# Patient Record
Sex: FEMALE | Race: WHITE | Employment: UNEMPLOYED | URBAN - METROPOLITAN AREA
[De-identification: names, ages, dates, MRNs, and addresses within clinical notes are randomized per-mention and may not be internally consistent; named-entity substitution may affect disease eponyms.]

---

## 2017-01-23 DIAGNOSIS — E72.4 OTC (ORNITHINE TRANSCARBAMYLASE DEFICIENCY) (H): Primary | ICD-10-CM

## 2017-01-23 RX ORDER — CITRULLINE
1.8 POWDER (GRAM) MISCELLANEOUS 4 TIMES DAILY
Qty: 250 G | Refills: 12 | Status: SHIPPED | OUTPATIENT
Start: 2017-01-23 | End: 2018-06-13

## 2017-02-23 ENCOUNTER — OFFICE VISIT (OUTPATIENT)
Dept: ENDOCRINOLOGY | Facility: CLINIC | Age: 37
End: 2017-02-23

## 2017-02-23 VITALS
HEART RATE: 77 BPM | SYSTOLIC BLOOD PRESSURE: 139 MMHG | HEIGHT: 65 IN | DIASTOLIC BLOOD PRESSURE: 85 MMHG | BODY MASS INDEX: 39.65 KG/M2 | WEIGHT: 238 LBS

## 2017-02-23 DIAGNOSIS — E46 PROTEIN-CALORIE MALNUTRITION (H): ICD-10-CM

## 2017-02-23 DIAGNOSIS — E55.9 VITAMIN D DEFICIENCY: ICD-10-CM

## 2017-02-23 DIAGNOSIS — E71.42 CARNITINE DEFICIENCY DUE TO INBORN ERRORS OF METABOLISM (H): ICD-10-CM

## 2017-02-23 DIAGNOSIS — E72.4 OTC (ORNITHINE TRANSCARBAMYLASE DEFICIENCY) (H): Primary | ICD-10-CM

## 2017-02-23 LAB
ALBUMIN SERPL-MCNC: 3.8 G/DL (ref 3.4–5)
ALP SERPL-CCNC: 73 U/L (ref 40–150)
ALT SERPL W P-5'-P-CCNC: 44 U/L (ref 0–50)
ANION GAP SERPL CALCULATED.3IONS-SCNC: 10 MMOL/L (ref 3–14)
AST SERPL W P-5'-P-CCNC: 23 U/L (ref 0–45)
BASOPHILS # BLD AUTO: 0 10E9/L (ref 0–0.2)
BASOPHILS NFR BLD AUTO: 0.5 %
BILIRUB SERPL-MCNC: 0.5 MG/DL (ref 0.2–1.3)
BUN SERPL-MCNC: 7 MG/DL (ref 7–30)
CALCIUM SERPL-MCNC: 8.8 MG/DL (ref 8.5–10.1)
CHLORIDE SERPL-SCNC: 108 MMOL/L (ref 94–109)
CO2 SERPL-SCNC: 21 MMOL/L (ref 20–32)
CREAT SERPL-MCNC: 0.63 MG/DL (ref 0.52–1.04)
DIFFERENTIAL METHOD BLD: NORMAL
EOSINOPHIL # BLD AUTO: 0.1 10E9/L (ref 0–0.7)
EOSINOPHIL NFR BLD AUTO: 1.4 %
ERYTHROCYTE [DISTWIDTH] IN BLOOD BY AUTOMATED COUNT: 13 % (ref 10–15)
GFR SERPL CREATININE-BSD FRML MDRD: NORMAL ML/MIN/1.7M2
GLUCOSE SERPL-MCNC: 95 MG/DL (ref 70–99)
HCT VFR BLD AUTO: 41.6 % (ref 35–47)
HGB BLD-MCNC: 13.8 G/DL (ref 11.7–15.7)
IMM GRANULOCYTES # BLD: 0 10E9/L (ref 0–0.4)
IMM GRANULOCYTES NFR BLD: 0 %
LYMPHOCYTES # BLD AUTO: 2.4 10E9/L (ref 0.8–5.3)
LYMPHOCYTES NFR BLD AUTO: 43.2 %
MCH RBC QN AUTO: 29.1 PG (ref 26.5–33)
MCHC RBC AUTO-ENTMCNC: 33.2 G/DL (ref 31.5–36.5)
MCV RBC AUTO: 88 FL (ref 78–100)
MONOCYTES # BLD AUTO: 0.3 10E9/L (ref 0–1.3)
MONOCYTES NFR BLD AUTO: 4.6 %
NEUTROPHILS # BLD AUTO: 2.8 10E9/L (ref 1.6–8.3)
NEUTROPHILS NFR BLD AUTO: 50.3 %
NRBC # BLD AUTO: 0 10*3/UL
NRBC BLD AUTO-RTO: 0 /100
PLATELET # BLD AUTO: 217 10E9/L (ref 150–450)
POTASSIUM SERPL-SCNC: 3.6 MMOL/L (ref 3.4–5.3)
PREALB SERPL IA-MCNC: 19 MG/DL (ref 15–45)
PROT SERPL-MCNC: 7.6 G/DL (ref 6.8–8.8)
RBC # BLD AUTO: 4.75 10E12/L (ref 3.8–5.2)
SODIUM SERPL-SCNC: 139 MMOL/L (ref 133–144)
WBC # BLD AUTO: 5.6 10E9/L (ref 4–11)

## 2017-02-23 PROCEDURE — 82139 AMINO ACIDS QUAN 6 OR MORE: CPT | Performed by: MEDICAL GENETICS

## 2017-02-23 PROCEDURE — 84466 ASSAY OF TRANSFERRIN: CPT | Performed by: MEDICAL GENETICS

## 2017-02-23 PROCEDURE — 82306 VITAMIN D 25 HYDROXY: CPT | Performed by: MEDICAL GENETICS

## 2017-02-23 ASSESSMENT — PAIN SCALES - GENERAL: PAINLEVEL: NO PAIN (0)

## 2017-02-23 NOTE — LETTER
2017       RE: Shira William   [facility] [id number]  [facility] [address]       Adult Metabolism Clinic Return Visit  Name:  Shira William  :   1980  MRN:   [id number]  Date of Visit: 2017  PCP: Alex Lewis    Managing Metabolic Center(s):  [facility].    Chief Complaint:  Ms. Shira William is a 36 year old female whom I saw in follow up in Metabolism Clinic for OTC deficiency. She also saw our dietitian and nurse coordinator at this visit.     Assessment:    Jenni has been hospitalized once due to hyperammonemia since her last visit. She has recovered well but reports that she has not been taking her Carnitor regularly. We will test her carnitine level to check if she still needs the medication. She continues to mourn the loss of her baby and also has other stressors worrying her at the moment, which causes her IBS to worsen.     Plan:    1. Testing ordered at this visit:   Orders Placed This Encounter   Procedures     Amino acids plasma quantitative     Prealbumin     Transferrin     Vitamin D Deficiency     Carnitine free and total     Comprehensive metabolic panel     CBC with platelets differential   .    Results are as noted, below. Additional recommendations based on these laboratory results:  Glutamine was normal suggesting no chronic hyperammonemia is present.  Several other aminoacids are low consistent with dietary restriction. Hepatic proteins as a measure of protein intake adequacy showed a normal profile of proteins suggesting general protein sufficiency. Liver enzyme tests, kidney function tests, and electrolytes were normal. Blood counts were normal. Vitamin D was low. Plasma free carnitine was below the normal range.  2. Medications: Continue  current medications as noted, below. Shira needs to take her carnitine and vitamin D.  3. Metabolic dietician follow up with Jenni Joyner RD, LD at this visit  to review special dietary concerns. Metabolic diet should be continued as prescribed.  See nutrition history, below. They discussed:  4. Continue to observe emergency precautions as previously discussed.  Our on-call metabolic service is available 24 hours/day by calling the page  ([phone]) and asking for the Genetics and Metabolism doctor on call.  5. Return to the Adult Metabolism Clinic in 6 months for follow-up.     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 The other time she was hospitalized for 3 days due to hyperammonemia. She has recovered well.     Jenni reports that she has not been taking Carnitor as she should, she says that she might have been taken it once a week.     Jenni has a lot of things going on in her life that causes her stress right now and therefore her bowel symptoms have worsened during the last couple of days.     Interval History:  Shira was last seen in our clinic on 8/24/2016.  Since the last clinic visit Shira was seen for 2 urgent clinic visits due to her OTC deficiency.  She was seen in the emergency department 2 times, and 2 of those visits were metabolic related.  She currently knows how to contact the 24 hour metabolic specialist  on call.  1 hospitalizations occurred. Acute metabolic decompensation was noted during 1 of those hospitalizations. She had no general anesthesia and no surgical procedures since the last clinic visit.     Other health services currently received are primary care, dietitian, orthopedics and psychology  Current research study participation Longitudinal Study of Urea Cycle Disorders.     Personal History:  Family History Update:   There was no new family history information elicited at this visit    Lives with father and mother.  Stressors for patient and family: Jenni is still mourning the loss of her baby 2 years ago. Her , Eran, is currently in [employer] which has caused them to lose their house. Therefore, Jenni currently lives with her parents. Eran gets out of [employer] in the beginning of March and until then she sees him twice a week. When Eran gets out they will move into Eran's grandmother.   Community resources received currently are family support related to this inborn error of metabolism.  Current insurance status state/federal program (Medicaid/Medicare).   Neuropsychological evaluation was not performed since the last clinic visit.       I have reviewed Shira s past medical history, family history, social history, medications and allergies as documented in the patient's electronic medical record. There were no additional findings except as noted.     I have reviewed Shira s past medical history, family history, social history, medications and allergies as documented in the patient's electronic medical record.  There were no additional findings except as noted.     Review of Systems:  Constitional: Overweight  Eyes: wears glasses  Ears/Nose/Throat: negative - normal hearing  Respiratory: Occasionally needs albuterol  Cardiovascular: On atenolol  Gastrointestinal: Irritable bowel symptoms, see about  Genitourinary: negative  Musculoskeletal: negative  Endocrine:  "negative  Hematologic/Lymphatic/Immunologic: negative  Integument: Vitiligo  Neurologic: intellectual disability  Psychiatric: Anxiety and some depression, currently on medications    Medications:  Current Outpatient Prescriptions   Medication Sig Dispense Refill     l-citrulline POWD Take 1.8 g by mouth 4 times daily 250 g 12     glycerol Phenylbutyrate (RAVICTI) 1.1 GM/ML solution Take 3.4 mLs (3.74 g) by mouth 3 times daily With food 306 mL 6     omeprazole (PRILOSEC) 20 MG capsule Take by mouth daily       sertraline (ZOLOFT) 100 MG tablet Take by mouth daily       HYDROcodone-acetaminophen (VICODIN) 5-500 MG per tablet Take 1-2 tablets by mouth every 6 hours as needed for moderate to severe pain       ondansetron (ZOFRAN-ODT) 4 MG disintegrating tablet Take by mouth every 12 hours as needed for nausea       pimecrolimus (ELIDEL) 1 % cream Apply topically 2 times daily       atenolol (TENORMIN) 50 MG tablet Take 50 mg by mouth daily       ibuprofen (ADVIL,MOTRIN) 400 MG tablet Take 400 mg by mouth every 6 hours as needed for moderate pain       levOCARNitine (CARNITOR) 1 GM/10ML solution Take 5 mLs (500 mg) by mouth 3 times daily 465 mL 11     Multiple Vitamins-Iron (MULTIVITAMIN  /IRON) TABS Take 1 tablet daily.       calcium-vitamin D (CALCIUM 600 + D) 600-400 MG-UNIT per tablet Take 1 tablet by mouth 2 times daily (with meals). 60 tablet 0        Allergies:  Allergies   Allergen Reactions     Compazine [Prochlorperazine]      Erythromycin      Msg [Magnesium Salicylate]      Physical Examination:  Blood pressure 139/85, pulse 77, height 5' 5\" (165.1 cm), weight 238 lb (108 kg).  Body surface area is 2.23 meters squared.  General: This was a well-developed, well-nourished female who responded appropriately to all requests during the examination.    Head and Neck:  She had hair of normal texture and distribution and her head was proportionate in appearance.The neck was supple without lymphadenopathy.    Eyes:  " The pupils were equal, round, and reacted to light.   The conjunctivae were clear.   Ears:  Her ears were normal in architecture and placement.   Nose: The nose was clear.    Mouth and Throat: her dentition was normal.  The throat was without erythema.    Respiratory: The chest was clear to auscultation and had a symmetric appearance.    CV:  On examination of the heart, the rhythm was regular and there was no murmur.  The peripheral pulses were normal.    GI: The abdomen was soft and had normal bowel sounds.  There was no hepatosplenomegaly.    : I deferred a  examination.   Musculoskeletal: There was a full range of motion on the extremity exam, and normal muscular volume and bulk.   Neurologic: The neurologic exam was normal, with normal cranial nerves, normal deep tendon reflexes, normal sensation, and a normal gait. She had normal tone.   Integument: The skin was normal with no rashes or unusual pigmentation.    Results of laboratory studies collected at this visit and available when this note was completed:   Results for orders placed or performed in visit on 02/23/17   Amino acids plasma quantitative   Result Value Ref Range    A-Aminoadipic <1 0 - 6 umol/dL    Alanine 32 22 - 62 umol/dL    Anserine Negative umol/dL    Arginine 6 2 - 18 umol/dL    Asparagine 4 1 - 5 umol/dL    Aspartic Acid 1 0 - 4 umol/dL    B-Alanine Plasma Negative umol/dL    B-Aminoisobutyric Negative umol/dL    Carnosine Negative umol/dL    Citrulline  0 - 8 umol/dL     2.4  Citrulline Reference Ranges in umol/dL as stated by [facility]:   Premature               2.0-8.7   1-31 days               1.0-4.5   32 days - 23 months     0.3-3.5   2 years - 18 years      0.1-4.6   >19 years               1.2-5.5      Cystathionine Negative umol/dL    Cystine 10 3 - 15 umol/dL    Glutamic Acid 12 0 - 16 umol/dL    Glutamine 68 41 - 86 umol/dL    Glycine 21 13 - 50 umol/dL    Histidine 7 3 - 15 umol/dL    1-Methylhistidine 2 0 -  2 umol/dL    3-Methylhistidine <1 0 - 1 umol/dL    Homocysteine umol/dL Negative umol/dL    Hydroxylysine Negative umol/dL    Hydroxyproline 1 0 - 3 umol/dL    Isoleucine 7 4 - 11 umol/dL    Leucine 12 8 - 21 umol/dL    Lysine 18 6 - 26 umol/dL    Methionine 2 1 - 6 umol/dL    Ornithine 5 2 - 16 umol/dL    Phenylalanine umol/dL 6 3 - 10 umol/dL    Proline 18 0 - 48 umol/dL    Sarcosine Plasma 1 umol/dL    Serine 9 4 - 18 umol/dL    Taurine 6 (L) 7 - 32 umol/dL    Threonine 10 5 - 25 umol/dL    Tyrosine 8 4 - 13 umol/dL    Valine 23 8 - 46 umol/dL   Prealbumin   Result Value Ref Range    Prealbumin 19 15 - 45 mg/dL   Transferrin   Result Value Ref Range    Transferrin 273 210 - 360 mg/dL   Vitamin D Deficiency   Result Value Ref Range    Vitamin D Deficiency screening 16 (L) 20 - 75 ug/L   Carnitine free and total   Result Value Ref Range    Carnitine Free 16 (L)     CarnitineTotal 32 (L)     Carnitine Esterified 16     Carnitine Esterified/Free Ratio 1.0    Comprehensive metabolic panel   Result Value Ref Range    Sodium 139 133 - 144 mmol/L    Potassium 3.6 3.4 - 5.3 mmol/L    Chloride 108 94 - 109 mmol/L    Carbon Dioxide 21 20 - 32 mmol/L    Anion Gap 10 3 - 14 mmol/L    Glucose 95 70 - 99 mg/dL    Urea Nitrogen 7 7 - 30 mg/dL    Creatinine 0.63 0.52 - 1.04 mg/dL    GFR Estimate >90  Non  GFR Calc   >60 mL/min/1.7m2    GFR Estimate If Black >90   GFR Calc   >60 mL/min/1.7m2    Calcium 8.8 8.5 - 10.1 mg/dL    Bilirubin Total 0.5 0.2 - 1.3 mg/dL    Albumin 3.8 3.4 - 5.0 g/dL    Protein Total 7.6 6.8 - 8.8 g/dL    Alkaline Phosphatase 73 40 - 150 U/L    ALT 44 0 - 50 U/L    AST 23 0 - 45 U/L   CBC with platelets differential   Result Value Ref Range    WBC 5.6 4.0 - 11.0 10e9/L    RBC Count 4.75 3.8 - 5.2 10e12/L    Hemoglobin 13.8 11.7 - 15.7 g/dL    Hematocrit 41.6 35.0 - 47.0 %    MCV 88 78 - 100 fl    MCH 29.1 26.5 - 33.0 pg    MCHC 33.2 31.5 - 36.5 g/dL    RDW 13.0 10.0 - 15.0 %     Platelet Count 217 150 - 450 10e9/L    Diff Method Automated Method     % Neutrophils 50.3 %    % Lymphocytes 43.2 %    % Monocytes 4.6 %    % Eosinophils 1.4 %    % Basophils 0.5 %    % Immature Granulocytes 0.0 %    Nucleated RBCs 0 0 /100    Absolute Neutrophil 2.8 1.6 - 8.3 10e9/L    Absolute Lymphocytes 2.4 0.8 - 5.3 10e9/L    Absolute Monocytes 0.3 0.0 - 1.3 10e9/L    Absolute Eosinophils 0.1 0.0 - 0.7 10e9/L    Absolute Basophils 0.0 0.0 - 0.2 10e9/L    Abs Immature Granulocytes 0.0 0 - 0.4 10e9/L    Absolute Nucleated RBC 0.0        CHUN RODRIGUEZ M.D.  Professor and Director   Division of Genetics and Metabolism  Department of Pediatrics  [facility]    Routed to patient in Comm [facility]  Also to Alex Lewis

## 2017-02-23 NOTE — PATIENT INSTRUCTIONS
Metabolism Clinic    Maintain metabolic diet and medications.  Call if any general care questions arise - contact our nurse coordinator Sharyn Mattson at [phone].  Contact the metabolic doctor on-call if any immediate need because of illness - [phone]

## 2017-02-23 NOTE — PROGRESS NOTES
Adult Metabolism Clinic Return Visit  Name:  Shira William  :   1980  MRN:   [id number]  Date of Visit: 2017  PCP: Alex Lewis    Managing Metabolic Center(s):  [facility] Adult Metabolism Clinic.    Chief Complaint:  Ms. Shira William is a 36 year old female whom I saw in follow up in Metabolism Clinic for OTC deficiency. She also saw our dietitian and nurse coordinator at this visit.     Assessment:    Jenni has been hospitalized once due to hyperammonemia since her last visit. She has recovered well but reports that she has not been taking her Carnitor regularly. We will test her carnitine level to check if she still needs the medication. She continues to mourn the loss of her baby and also has other stressors worrying her at the moment, which causes her IBS to worsen.     Plan:    1. Testing ordered at this visit:   Orders Placed This Encounter   Procedures     Amino acids plasma quantitative     Prealbumin     Transferrin     Vitamin D Deficiency     Carnitine free and total     Comprehensive metabolic panel     CBC with platelets differential   .    Results are as noted, below. Additional recommendations based on these laboratory results:  Glutamine was normal suggesting no chronic hyperammonemia is present.  Several other aminoacids are low consistent with dietary restriction. Hepatic proteins as a measure of protein intake adequacy showed a normal profile of proteins suggesting general protein sufficiency. Liver enzyme tests, kidney function tests, and electrolytes were normal. Blood counts were normal. Vitamin D was low. Plasma free carnitine was below the normal range.  2. Medications: Continue  current medications as noted, below. Shira needs to take her carnitine and vitamin D.  3. Metabolic dietician follow up with Jenni Joyner RD, LD at this visit to review special dietary concerns. Metabolic diet should be continued as prescribed.  See  nutrition history, below. They discussed:  4. Continue to observe emergency precautions as previously discussed.  Our on-call metabolic service is available 24 hours/day by calling the page  ([phone]) and asking for the Genetics and Metabolism doctor on call.  5. Return to the Adult Metabolism Clinic in 6 months for follow-up.     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 The other time she was hospitalized for 3 days due to hyperammonemia. She has recovered well.     Jenni reports that she has not been taking Carnitor as she should, she says that she might have been taken it once a week.     Jenni has a lot of things going on in her life that causes her stress right now and therefore her bowel symptoms have worsened during the last couple of days.     Interval History:  Shira was last seen in our clinic on 8/24/2016.  Since the last clinic visit Shira was seen for 2 urgent clinic visits due to her OTC deficiency.  She was seen in the emergency department 2 times, and 2 of those visits were metabolic related.  She currently knows how to contact the 24 hour metabolic specialist on call.  1 hospitalizations occurred. Acute metabolic decompensation was noted during 1 of  those hospitalizations. She had no general anesthesia and no surgical procedures since the last clinic visit.     Other health services currently received are primary care, dietitian, orthopedics and psychology  Current research study participation Longitudinal Study of Urea Cycle Disorders.     Personal History:  Family History Update:   There was no new family history information elicited at this visit    Lives with father and mother.  Stressors for patient and family: Jenni is still mourning the loss of her baby 2 years ago. Her , Eran, is currently in half-way which has caused them to lose their house. Therefore, Jenni currently lives with her parents. Eran gets out of half-way in the beginning of March and until then she sees him twice a week. When Eran gets out they will move into Eran's grandmother.   Community resources received currently are family support related to this inborn error of metabolism.  Current insurance status state/federal program (Medicaid/Medicare).   Neuropsychological evaluation was not performed since the last clinic visit.       I have reviewed Shira s past medical history, family history, social history, medications and allergies as documented in the patient's electronic medical record. There were no additional findings except as noted.     I have reviewed Shira s past medical history, family history, social history, medications and allergies as documented in the patient's electronic medical record.  There were no additional findings except as noted.     Review of Systems:  Constitional: Overweight  Eyes: wears glasses  Ears/Nose/Throat: negative - normal hearing  Respiratory: Occasionally needs albuterol  Cardiovascular: On atenolol  Gastrointestinal: Irritable bowel symptoms, see about  Genitourinary: negative  Musculoskeletal: negative  Endocrine: negative  Hematologic/Lymphatic/Immunologic: negative  Integument: Vitiligo  Neurologic: intellectual disability  Psychiatric: Anxiety  "and some depression, currently on medications    Medications:  Current Outpatient Prescriptions   Medication Sig Dispense Refill     l-citrulline POWD Take 1.8 g by mouth 4 times daily 250 g 12     glycerol Phenylbutyrate (RAVICTI) 1.1 GM/ML solution Take 3.4 mLs (3.74 g) by mouth 3 times daily With food 306 mL 6     omeprazole (PRILOSEC) 20 MG capsule Take by mouth daily       sertraline (ZOLOFT) 100 MG tablet Take by mouth daily       HYDROcodone-acetaminophen (VICODIN) 5-500 MG per tablet Take 1-2 tablets by mouth every 6 hours as needed for moderate to severe pain       ondansetron (ZOFRAN-ODT) 4 MG disintegrating tablet Take by mouth every 12 hours as needed for nausea       pimecrolimus (ELIDEL) 1 % cream Apply topically 2 times daily       atenolol (TENORMIN) 50 MG tablet Take 50 mg by mouth daily       ibuprofen (ADVIL,MOTRIN) 400 MG tablet Take 400 mg by mouth every 6 hours as needed for moderate pain       levOCARNitine (CARNITOR) 1 GM/10ML solution Take 5 mLs (500 mg) by mouth 3 times daily 465 mL 11     Multiple Vitamins-Iron (MULTIVITAMIN  /IRON) TABS Take 1 tablet daily.       calcium-vitamin D (CALCIUM 600 + D) 600-400 MG-UNIT per tablet Take 1 tablet by mouth 2 times daily (with meals). 60 tablet 0        Allergies:  Allergies   Allergen Reactions     Compazine [Prochlorperazine]      Erythromycin      Msg [Magnesium Salicylate]      Physical Examination:  Blood pressure 139/85, pulse 77, height 5' 5\" (165.1 cm), weight 238 lb (108 kg).  Body surface area is 2.23 meters squared.  General: This was a well-developed, well-nourished female who responded appropriately to all requests during the examination.    Head and Neck:  She had hair of normal texture and distribution and her head was proportionate in appearance.The neck was supple without lymphadenopathy.    Eyes:  The pupils were equal, round, and reacted to light.   The conjunctivae were clear.   Ears:  Her ears were normal in architecture and " placement.   Nose: The nose was clear.    Mouth and Throat: her dentition was normal.  The throat was without erythema.    Respiratory: The chest was clear to auscultation and had a symmetric appearance.    CV:  On examination of the heart, the rhythm was regular and there was no murmur.  The peripheral pulses were normal.    GI: The abdomen was soft and had normal bowel sounds.  There was no hepatosplenomegaly.    : I deferred a  examination.   Musculoskeletal: There was a full range of motion on the extremity exam, and normal muscular volume and bulk.   Neurologic: The neurologic exam was normal, with normal cranial nerves, normal deep tendon reflexes, normal sensation, and a normal gait. She had normal tone.   Integument: The skin was normal with no rashes or unusual pigmentation.    Results of laboratory studies collected at this visit and available when this note was completed:   Results for orders placed or performed in visit on 02/23/17   Amino acids plasma quantitative   Result Value Ref Range    A-Aminoadipic <1 0 - 6 umol/dL    Alanine 32 22 - 62 umol/dL    Anserine Negative umol/dL    Arginine 6 2 - 18 umol/dL    Asparagine 4 1 - 5 umol/dL    Aspartic Acid 1 0 - 4 umol/dL    B-Alanine Plasma Negative umol/dL    B-Aminoisobutyric Negative umol/dL    Carnosine Negative umol/dL    Citrulline  0 - 8 umol/dL     2.4  Citrulline Reference Ranges in umol/dL as stated by [facility]:   Premature               2.0-8.7   1-31 days               1.0-4.5   32 days - 23 months     0.3-3.5   2 years - 18 years      0.1-4.6   >19 years               1.2-5.5      Cystathionine Negative umol/dL    Cystine 10 3 - 15 umol/dL    Glutamic Acid 12 0 - 16 umol/dL    Glutamine 68 41 - 86 umol/dL    Glycine 21 13 - 50 umol/dL    Histidine 7 3 - 15 umol/dL    1-Methylhistidine 2 0 - 2 umol/dL    3-Methylhistidine <1 0 - 1 umol/dL    Homocysteine umol/dL Negative umol/dL    Hydroxylysine Negative umol/dL     Hydroxyproline 1 0 - 3 umol/dL    Isoleucine 7 4 - 11 umol/dL    Leucine 12 8 - 21 umol/dL    Lysine 18 6 - 26 umol/dL    Methionine 2 1 - 6 umol/dL    Ornithine 5 2 - 16 umol/dL    Phenylalanine umol/dL 6 3 - 10 umol/dL    Proline 18 0 - 48 umol/dL    Sarcosine Plasma 1 umol/dL    Serine 9 4 - 18 umol/dL    Taurine 6 (L) 7 - 32 umol/dL    Threonine 10 5 - 25 umol/dL    Tyrosine 8 4 - 13 umol/dL    Valine 23 8 - 46 umol/dL   Prealbumin   Result Value Ref Range    Prealbumin 19 15 - 45 mg/dL   Transferrin   Result Value Ref Range    Transferrin 273 210 - 360 mg/dL   Vitamin D Deficiency   Result Value Ref Range    Vitamin D Deficiency screening 16 (L) 20 - 75 ug/L   Carnitine free and total   Result Value Ref Range    Carnitine Free 16 (L)     CarnitineTotal 32 (L)     Carnitine Esterified 16     Carnitine Esterified/Free Ratio 1.0    Comprehensive metabolic panel   Result Value Ref Range    Sodium 139 133 - 144 mmol/L    Potassium 3.6 3.4 - 5.3 mmol/L    Chloride 108 94 - 109 mmol/L    Carbon Dioxide 21 20 - 32 mmol/L    Anion Gap 10 3 - 14 mmol/L    Glucose 95 70 - 99 mg/dL    Urea Nitrogen 7 7 - 30 mg/dL    Creatinine 0.63 0.52 - 1.04 mg/dL    GFR Estimate >90  Non  GFR Calc   >60 mL/min/1.7m2    GFR Estimate If Black >90   GFR Calc   >60 mL/min/1.7m2    Calcium 8.8 8.5 - 10.1 mg/dL    Bilirubin Total 0.5 0.2 - 1.3 mg/dL    Albumin 3.8 3.4 - 5.0 g/dL    Protein Total 7.6 6.8 - 8.8 g/dL    Alkaline Phosphatase 73 40 - 150 U/L    ALT 44 0 - 50 U/L    AST 23 0 - 45 U/L   CBC with platelets differential   Result Value Ref Range    WBC 5.6 4.0 - 11.0 10e9/L    RBC Count 4.75 3.8 - 5.2 10e12/L    Hemoglobin 13.8 11.7 - 15.7 g/dL    Hematocrit 41.6 35.0 - 47.0 %    MCV 88 78 - 100 fl    MCH 29.1 26.5 - 33.0 pg    MCHC 33.2 31.5 - 36.5 g/dL    RDW 13.0 10.0 - 15.0 %    Platelet Count 217 150 - 450 10e9/L    Diff Method Automated Method     % Neutrophils 50.3 %    % Lymphocytes 43.2 %    %  Monocytes 4.6 %    % Eosinophils 1.4 %    % Basophils 0.5 %    % Immature Granulocytes 0.0 %    Nucleated RBCs 0 0 /100    Absolute Neutrophil 2.8 1.6 - 8.3 10e9/L    Absolute Lymphocytes 2.4 0.8 - 5.3 10e9/L    Absolute Monocytes 0.3 0.0 - 1.3 10e9/L    Absolute Eosinophils 0.1 0.0 - 0.7 10e9/L    Absolute Basophils 0.0 0.0 - 0.2 10e9/L    Abs Immature Granulocytes 0.0 0 - 0.4 10e9/L    Absolute Nucleated RBC 0.0        CHUN RODRIGUEZ M.D.  Professor and Director   Division of Genetics and Metabolism  Department of Pediatrics  [facility]    Routed to patient in Comm Mgt  Also to Alex Lewis

## 2017-02-23 NOTE — MR AVS SNAPSHOT
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  Call if any general care questions arise - contact our nurse coordinator Sharyn Mattson at [phone].  Contact the metabolic doctor on-call if any immediate need because of illness - [phone]          Follow-ups after your visit        Follow-up notes from your care team     Return in about 6 months (around 8/23/2017).      Your next 10 appointments already scheduled     Aug 24, 2017  3:45 PM CDT   (Arrive by 3:30 PM)   Return Visit with Toya Clayton MD   [facility] Metabolic Disorders ([facility])    [address] [place] [id number] [phone]              Who to contact     Please call your clinic at [phone] to:    Ask questions about your health    Make or cancel appointments    Discuss your medicines    Learn about your test results    Speak to your doctor   If you have compliments or concerns about an experience at your clinic, or if you wish to file a complaint, please contact [facility]icians Patient Relations at [phone] or email us at [email or web address]         Additional Information About Your Visit        MyChart Information     3nder is an electronic gateway that provides easy, online access to your medical records. With 3nder, you can request  "a clinic appointment, read your test results, renew a prescription or communicate with your care team.     To sign up for PixelFish visit the website at [email or web address]   You will be asked to enter the access code listed below, as well as some personal information. Please follow the directions to create your username and password.     Your access code is: [email or web address]  Expires: 5/10/2017  6:30 AM     Your access code will  in 90 days. If you need help or a new code, please contact your [facility] or call [phone] for assistance.        Care EveryWhere ID     This is your Care EveryWhere ID. This could be used by other organizations to access your [place] medical records  [id number]        Your Vitals Were     Pulse Height BMI (Body Mass Index)             77 1.651 m (5' 5\") 39.61 kg/m2          Blood Pressure from Last 3 Encounters:   17 139/85   16 117/75   16 130/76    Weight from Last 3 Encounters:   17 108 kg (238 lb)   16 107.4 kg (236 lb 11.2 oz)   16 99.8 kg (220 lb)              We Performed the Following     Amino acids plasma quantitative     Carnitine free and total     CBC with platelets differential     Comprehensive metabolic panel     Prealbumin     Transferrin     Vitamin D Deficiency        Primary Care Provider Office Phone # Fax #    Alex Joshua [phone]       [address] [place] [address]        Thank you!     Thank you for choosing [facility] METABOLIC DISORDERS  for your care. Our goal is always to provide you with excellent care. Hearing back from our patients is one way we can continue to improve our services. Please take a few minutes to complete the written survey that you may receive in the mail after your visit with us. Thank you!             Your Updated Medication List - Protect others around you: Learn how to safely use, store and throw away your " medicines at www.disposemymeds.org.          This list is accurate as of: 2/23/17  5:13 PM.  Always use your most recent med list.                   Brand Name Dispense Instructions for use    atenolol 50 MG tablet    TENORMIN     Take 50 mg by mouth daily       calcium-vitamin D 600-400 MG-UNIT per tablet    calcium 600 + D    60 tablet    Take 1 tablet by mouth 2 times daily (with meals).       glycerol Phenylbutyrate 1.1 GM/ML solution    RAVICTI    306 mL    Take 3.4 mLs (3.74 g) by mouth 3 times daily With food       HYDROcodone-acetaminophen 5-500 MG per tablet    VICODIN     Take 1-2 tablets by mouth every 6 hours as needed for moderate to severe pain       ibuprofen 400 MG tablet    ADVIL/MOTRIN     Take 400 mg by mouth every 6 hours as needed for moderate pain       l-citrulline Powd     250 g    Take 1.8 g by mouth 4 times daily       levOCARNitine 1 GM/10ML solution    CARNITOR    465 mL    Take 5 mLs (500 mg) by mouth 3 times daily       Multivitamin  /Iron Tabs      Take 1 tablet daily.       omeprazole 20 MG CR capsule    priLOSEC     Take by mouth daily       ondansetron 4 MG ODT tab    ZOFRAN-ODT     Take by mouth every 12 hours as needed for nausea       pimecrolimus 1 % cream    ELIDEL     Apply topically 2 times daily       sertraline 100 MG tablet    ZOLOFT     Take by mouth daily

## 2017-02-24 LAB
DEPRECATED CALCIDIOL+CALCIFEROL SERPL-MC: 16 UG/L (ref 20–75)
TRANSFERRIN SERPL-MCNC: 273 MG/DL (ref 210–360)

## 2017-02-27 LAB
(HCYS)2 SERPL-SCNC: NEGATIVE UMOL/DL
1ME-HIST SERPL-SCNC: 2 UMOL/DL (ref 0–2)
3ME-HISTIDINE SERPL-SCNC: <1 UMOL/DL (ref 0–1)
AAA SERPL-SCNC: <1 UMOL/DL (ref 0–6)
ALANINE SERPL-SCNC: NEGATIVE UMOL/DL
ALANINE SFR SERPL: 32 UMOL/DL (ref 22–62)
ANSERINE SERPL-SCNC: NEGATIVE UMOL/DL
ARGININE SERPL-SCNC: 6 UMOL/DL (ref 2–18)
ASPARAGINE SERPL-SCNC: 4 UMOL/DL (ref 1–5)
ASPARTATE SERPL-SCNC: 1 UMOL/DL (ref 0–4)
B-AIB SERPL-SCNC: NEGATIVE UMOL/DL
CARNOSINE SERPL-SCNC: NEGATIVE UMOL/DL
CITRULLINE SERPL-SCNC: ABNORMAL UMOL/DL (ref 0–8)
CYSTATHIONIN SERPL-SCNC: NEGATIVE UMOL/DL
CYSTINE SERPL-SCNC: 10 UMOL/DL (ref 3–15)
GLUTAMATE SERPL-SCNC: 12 UMOL/DL (ref 0–16)
GLUTAMATE SERPL-SCNC: 68 UMOL/DL (ref 41–86)
GLYCINE SERPL-SCNC: 21 UMOL/DL (ref 13–50)
HISTIDINE SERPL-SCNC: 7 UMOL/DL (ref 3–15)
ISOLEUCINE SERPL-SCNC: 7 UMOL/DL (ref 4–11)
LEUCINE SERPL-SCNC: 12 UMOL/DL (ref 8–21)
LYSINE SERPL-SCNC: 18 UMOL/DL (ref 6–26)
METHIONINE SERPL-SCNC: 2 UMOL/DL (ref 1–6)
OH-LYSINE SERPL-SCNC: NEGATIVE UMOL/DL
OH-PROLINE SERPL-SCNC: 1 UMOL/DL (ref 0–3)
ORNITHINE SERPL-SCNC: 5 UMOL/DL (ref 2–16)
PHE SERPL-SCNC: 6 UMOL/DL (ref 3–10)
PROLINE SERPL-SCNC: 18 UMOL/DL (ref 0–48)
SARCOSINE SERPL-SCNC: 1 UMOL/DL
SERINE SERPL-SCNC: 9 UMOL/DL (ref 4–18)
TAURINE SERPL-SCNC: 6 UMOL/DL (ref 7–32)
THREONINE SERPL-SCNC: 10 UMOL/DL (ref 5–25)
TYROSINE SERPL-SCNC: 8 UMOL/DL (ref 4–13)
VALINE SERPL-SCNC: 23 UMOL/DL (ref 8–46)

## 2017-03-01 LAB
ACYLCARNITINE SERPL-SCNC: 16 UMOL/L
CARN ESTERS/C0 SERPL-SRTO: 1 {RATIO}
CARNITINE FREE SERPL-SCNC: 16 UMOL/L
CARNITINE SERPL-SCNC: 32 UMOL/L

## 2017-08-24 ENCOUNTER — OFFICE VISIT (OUTPATIENT)
Dept: ENDOCRINOLOGY | Facility: CLINIC | Age: 37
End: 2017-08-24

## 2017-08-24 ENCOUNTER — ALLIED HEALTH/NURSE VISIT (OUTPATIENT)
Dept: ENDOCRINOLOGY | Facility: CLINIC | Age: 37
End: 2017-08-24

## 2017-08-24 VITALS
SYSTOLIC BLOOD PRESSURE: 126 MMHG | WEIGHT: 224.4 LBS | BODY MASS INDEX: 37.39 KG/M2 | HEART RATE: 69 BPM | DIASTOLIC BLOOD PRESSURE: 84 MMHG | HEIGHT: 65 IN

## 2017-08-24 DIAGNOSIS — E55.9 VITAMIN D DEFICIENCY: ICD-10-CM

## 2017-08-24 DIAGNOSIS — E71.42 CARNITINE DEFICIENCY DUE TO INBORN ERRORS OF METABOLISM (H): ICD-10-CM

## 2017-08-24 DIAGNOSIS — E72.4 OTC (ORNITHINE TRANSCARBAMYLASE DEFICIENCY) (H): ICD-10-CM

## 2017-08-24 DIAGNOSIS — E72.4 OTC (ORNITHINE TRANSCARBAMYLASE DEFICIENCY) (H): Primary | ICD-10-CM

## 2017-08-24 LAB
ALBUMIN SERPL-MCNC: 3.5 G/DL (ref 3.4–5)
ALP SERPL-CCNC: 75 U/L (ref 40–150)
ALT SERPL W P-5'-P-CCNC: 21 U/L (ref 0–50)
ANION GAP SERPL CALCULATED.3IONS-SCNC: 8 MMOL/L (ref 3–14)
AST SERPL W P-5'-P-CCNC: 11 U/L (ref 0–45)
BASOPHILS # BLD AUTO: 0 10E9/L (ref 0–0.2)
BASOPHILS NFR BLD AUTO: 0.6 %
BILIRUB SERPL-MCNC: 0.3 MG/DL (ref 0.2–1.3)
BUN SERPL-MCNC: 11 MG/DL (ref 7–30)
CALCIUM SERPL-MCNC: 8.9 MG/DL (ref 8.5–10.1)
CHLORIDE SERPL-SCNC: 104 MMOL/L (ref 94–109)
CO2 SERPL-SCNC: 25 MMOL/L (ref 20–32)
CREAT SERPL-MCNC: 0.71 MG/DL (ref 0.52–1.04)
DIFFERENTIAL METHOD BLD: ABNORMAL
EOSINOPHIL # BLD AUTO: 0.1 10E9/L (ref 0–0.7)
EOSINOPHIL NFR BLD AUTO: 0.8 %
ERYTHROCYTE [DISTWIDTH] IN BLOOD BY AUTOMATED COUNT: 15.4 % (ref 10–15)
GFR SERPL CREATININE-BSD FRML MDRD: >90 ML/MIN/1.7M2
GLUCOSE SERPL-MCNC: 85 MG/DL (ref 70–99)
HCT VFR BLD AUTO: 38.9 % (ref 35–47)
HGB BLD-MCNC: 12.2 G/DL (ref 11.7–15.7)
IMM GRANULOCYTES # BLD: 0 10E9/L (ref 0–0.4)
IMM GRANULOCYTES NFR BLD: 0.2 %
LYMPHOCYTES # BLD AUTO: 2.5 10E9/L (ref 0.8–5.3)
LYMPHOCYTES NFR BLD AUTO: 38.2 %
MCH RBC QN AUTO: 25.7 PG (ref 26.5–33)
MCHC RBC AUTO-ENTMCNC: 31.4 G/DL (ref 31.5–36.5)
MCV RBC AUTO: 82 FL (ref 78–100)
MONOCYTES # BLD AUTO: 0.4 10E9/L (ref 0–1.3)
MONOCYTES NFR BLD AUTO: 5.3 %
NEUTROPHILS # BLD AUTO: 3.7 10E9/L (ref 1.6–8.3)
NEUTROPHILS NFR BLD AUTO: 54.9 %
NRBC # BLD AUTO: 0 10*3/UL
NRBC BLD AUTO-RTO: 0 /100
PLATELET # BLD AUTO: 229 10E9/L (ref 150–450)
POTASSIUM SERPL-SCNC: 3.7 MMOL/L (ref 3.4–5.3)
PREALB SERPL IA-MCNC: 17 MG/DL (ref 15–45)
PROT SERPL-MCNC: 7.8 G/DL (ref 6.8–8.8)
RBC # BLD AUTO: 4.74 10E12/L (ref 3.8–5.2)
SODIUM SERPL-SCNC: 137 MMOL/L (ref 133–144)
WBC # BLD AUTO: 6.7 10E9/L (ref 4–11)

## 2017-08-24 PROCEDURE — 82306 VITAMIN D 25 HYDROXY: CPT | Performed by: MEDICAL GENETICS

## 2017-08-24 PROCEDURE — 82139 AMINO ACIDS QUAN 6 OR MORE: CPT | Performed by: MEDICAL GENETICS

## 2017-08-24 RX ORDER — LEVOCARNITINE 1 G/10ML
500 SOLUTION ORAL 3 TIMES DAILY
Qty: 465 ML | Refills: 11 | Status: SHIPPED | OUTPATIENT
Start: 2017-08-24 | End: 2018-09-21

## 2017-08-24 ASSESSMENT — PAIN SCALES - GENERAL: PAINLEVEL: NO PAIN (0)

## 2017-08-24 NOTE — MR AVS SNAPSHOT
MRN:[id number]                      After Visit Summary   2017    Shira William    MRN: [id number]           Visit Information        Provider Department      2017 4:00 PM Jenni Joyner [facility]        Your next 10 appointments already scheduled     2018  3:15 PM CST   (Arrive by 3:00 PM)   Return Visit with MD [facility] ([facility])    [address] [place] [id number] [phone]              MyChart Information     Apoforet is an electronic gateway that provides easy, online access to your medical records. With Yolto, you can request a clinic appointment, read your test results, renew a prescription or communicate with your care team.     To sign up for Apoforet visit the website at [email or web address]   You will be asked to enter the access code listed below, as well as some personal information. Please follow the directions to create your username and password.     Your access code is: [email or web address]  Expires: 2017  6:31 AM     Your access code will  in 90 days. If you need help or a new code, please contact your [facility] or call [phone] for assistance.        Care EveryWhere ID     This is your Care EveryWhere ID. This could be used by other organizations to access your [place] medical records  [id number]        Equal Access to Services     JAROD MANCINI : Hadii bravo mcginniso Sojavier, waaxda luqadaha, qaybta kaalmada adeegyada, vonnie mccrary. So [facility] [phone].    ATENCIÓN: Si habla español, tiene a sotelo disposición servicios gratuitos de asistencia lingüística. Llame al [phone].    We comply with applicable federal civil rights laws and [place] laws. We do not discriminate on the basis of race, color, national origin, age, disability sex, sexual orientation  or gender identity.

## 2017-08-24 NOTE — PATIENT INSTRUCTIONS
Metabolism Clinic    Maintain metabolic diet and medications.  Call if any general care questions arise - contact our nurse coordinator Sharyn Mattson at [phone].      Contact the metabolic doctor on-call if any immediate need because of illness - [phone]

## 2017-08-24 NOTE — MR AVS SNAPSHOT
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  Call if any general care questions arise - contact our nurse coordinator Sharyn Mattson at [phone].      Contact the metabolic doctor on-call if any immediate need because of illness - [phone]            Follow-ups after your visit        Follow-up notes from your care team     Return in about 6 months (around 2/24/2018).      Your next 10 appointments already scheduled     Aug 24, 2017  5:00 PM CDT   LAB with  LAB   [facility] ([facility])    [address] [place] [address] [phone]           Patient must bring picture ID. Patient should be prepared to give a urine specimen  Please do not eat 10-12 hours before your appointment if you are coming in fasting for labs on lipids, cholesterol, or glucose (sugar). Pregnant women should follow their Care Team instructions. Water with medications is okay. Do not drink coffee or other fluids. If you have concerns about taking  your medications, please ask at office or if scheduling via [employer], send a message by clicking on Secure Messaging, Message Your Care Team.            Feb 22, 2018  3:15 PM CST   (Arrive by 3:00 PM)   Return Visit with Toya Clayton MD   [facility] Metabolic Disorders ([facility])    [address] [place] [address]  [facility] [address] [phone]              Future tests that were ordered for you today     Open Future Orders        Priority Expected Expires Ordered    CBC with platelets differential Routine  2018    Comprehensive metabolic panel Routine  2018    Carnitine free and total Routine  2018    Vitamin D Deficiency Routine  2018    Prealbumin Routine  2018    Amino acids plasma quantitative Routine  2018            Who to contact     Please call your clinic at [phone] to:    Ask questions about your health    Make or cancel appointments    Discuss your medicines    Learn about your test results    Speak to your doctor   If you have compliments or concerns about an experience at your clinic, or if you wish to file a complaint, please contact [facility] Patient Relations at [phone] or email us at [email or web address]         Additional Information About Your Visit        [employer] Information     [employer] is an electronic gateway that provides easy, online access to your medical records. With [employer], you can request a clinic appointment, read your test results, renew a prescription or communicate with your care team.     To sign up for [employer] visit the website at [email or web address]   You will be asked to enter the access code listed below, as well as some personal information. Please follow the directions to create your username and password.     Your access code is: [email or web address]  Expires: 2017  6:31 AM     Your access code will  in 90 days. If you need help or a new code, please contact your [facility] or call [phone] for assistance.        Care EveryWhere ID     This is your Care EveryWhere ID. This could be used by other organizations to access your [facility] medical records  [id number]        Your Vitals Were  "    Pulse Height BMI (Body Mass Index)             69 1.651 m (5' 5\") 37.34 kg/m2          Blood Pressure from Last 3 Encounters:   08/24/17 126/84   02/23/17 139/85   09/29/16 117/75    Weight from Last 3 Encounters:   08/24/17 101.8 kg (224 lb 6.4 oz)   02/23/17 108 kg (238 lb)   09/28/16 107.4 kg (236 lb 11.2 oz)              We Performed the Following     DIET CONSULT COMPREHENSIVE          Where to get your medicines      These medications were sent to [place], [place] - [address]  [address], [facility] [address]    Hours:  TRANSPLANT PHONE NUMBER [phone] Phone:  [phone]     glycerol Phenylbutyrate 1.1 GM/ML solution         These medications were sent to [id number] - [place] - [address] AT [address] & [facility] [address] [facility]  [address] [place] [address]    Hours:  24-hours Phone:  [phone]     levOCARNitine 1 GM/10ML solution          Primary Care Provider Office Phone # Fax #    Alex Lewis [phone]       [facility] [address]  [place] [address]        Equal Access to Services     PIEDAD MANCINI AH: Hadii aad ku hadasho Soomaali, waaxda luqadaha, qaybta kaalmada adeegyada, waxay idiin hayaan catracho fletcher . So [facility] [phone].    ATENCIÓN: Si habla español, tiene a sotelo disposición servicios gratuitos de asistencia lingüística. Llame al [phone].    We comply with applicable federal civil rights laws and [place] laws. We do not discriminate on the basis of race, color, national origin, age, disability sex, sexual orientation or gender identity.            Thank you!     Thank you for choosing [facility] METABOLIC DISORDERS  for your care. Our goal is always to provide you with excellent care. Hearing back from our patients is one way we can continue to improve our services. Please take a few minutes to complete the written " survey that you may receive in the mail after your visit with us. Thank you!             Your Updated Medication List - Protect others around you: Learn how to safely use, store and throw away your medicines at www.disposemymeds.org.          This list is accurate as of: 8/24/17  4:59 PM.  Always use your most recent med list.                   Brand Name Dispense Instructions for use Diagnosis    atenolol 50 MG tablet    TENORMIN     Take 50 mg by mouth daily    OTC (ornithine transcarbamylase deficiency) (H), Protein-calorie malnutrition (H)       calcium-vitamin D 600-400 MG-UNIT per tablet    calcium 600 + D    60 tablet    Take 1 tablet by mouth 2 times daily (with meals).    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)       glycerol Phenylbutyrate 1.1 GM/ML solution    RAVICTI    306 mL    Take 3.4 mLs (3.74 g) by mouth 3 times daily With food    OTC (ornithine transcarbamylase deficiency) (H)       HYDROcodone-acetaminophen 5-500 MG per tablet    VICODIN     Take 1-2 tablets by mouth every 6 hours as needed for moderate to severe pain        ibuprofen 400 MG tablet    ADVIL/MOTRIN     Take 400 mg by mouth every 6 hours as needed for moderate pain    OTC (ornithine transcarbamylase deficiency) (H), Protein-calorie malnutrition (H)       l-citrulline Powd     250 g    Take 1.8 g by mouth 4 times daily    OTC (ornithine transcarbamylase deficiency) (H)       levOCARNitine 1 GM/10ML solution    CARNITOR    465 mL    Take 5 mLs (500 mg) by mouth 3 times daily    OTC (ornithine transcarbamylase deficiency) (H), 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    OTC (ornithine transcarbamylase deficiency) (H)       omeprazole 20 MG CR capsule    priLOSEC     Take by mouth daily        ondansetron 4 MG ODT tab    ZOFRAN-ODT     Take by mouth every 12 hours as needed for nausea    OTC (ornithine transcarbamylase deficiency) (H),  Protein-calorie malnutrition (H)       pimecrolimus 1 % cream    ELIDEL     Apply topically 2 times daily    OTC (ornithine transcarbamylase deficiency) (H), Protein-calorie malnutrition (H)       sertraline 100 MG tablet    ZOLOFT     Take by mouth daily

## 2017-08-24 NOTE — Clinical Note
2017       RE: Shira William  [address]  [place] [address]     Dear Colleague,    Thank you for referring your patient, Shira William, to the [facility] METABOLIC DISORDERS at [facility]. Please see a copy of my visit note below.    Adult Metabolism Clinic Return Visit  Name:  Shira William  :   1980  MRN:   [id number]  Date of Visit: Aug 24, 2017  PCP: Alex Lewis    Immunization status is: {IMMUNIZATION STATUS:066640357}.  Managing Metabolic Center(s):  [facility] Adult Metabolism [facility].    Chief Complaint:  Ms. Shira William is a 36 year old female whom I saw in follow up in Metabolism Clinic for ***.  Ms. William was accompanied to this visit by her  {FAMILY MEMBERS SHORT:319232}. She also saw our {OTHER PROVIDERS:076375168} at this visit.     Assessment:    ***     Plan:    1. Testing ordered at this visit:   Orders Placed This Encounter   Procedures     DIET CONSULT COMPREHENSIVE     Amino acids plasma quantitative     Prealbumin     Vitamin D Deficiency     Carnitine free and total     Comprehensive metabolic panel     CBC with platelets differential   .    Results are as noted, below. Additional recommendations based on these laboratory results:  ***  2. Medications: Continue ***   3. Metabolic dietician follow up with Jenni Joyner RD, LD at this visit to review special dietary concerns. Metabolic diet should be continued as prescribed.  See nutrition history, below. They discussed:  ---  ***  ---  4.   Genetic counseling with {METABOLIC PEDIATRIC GEN COUNSELORS:477537489} to review ***.  5. Continue to observe emergency precautions as previously discussed.  Our on-call metabolic service is available 24 hours/day by calling the page  ([phone]) and asking for the Genetics and Metabolism doctor on call.  6. Return to the Adult Metabolism Clinic in *** {WEEKS OR MONTHS:079804} for  "follow-up.     7.  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      Interval History:  Shira was last seen in our clinic on ***.  Since the last clinic visit Shira was seen for *** urgent clinic visits due to ***.  She was seen in the emergency department *** times, and *** of those visits were metabolic related.  She currently {EMERGENCY:764233449}.  *** hospitalizations occurred. Acute metabolic decompensation was noted during *** of those hospitalizations.  *** inpatient days were metabolic related with *** days spent in the intensive care unit. She had {GENERAL ANESTHESIA:405672213::\"no general anesthesia\"} and {HAD SURGICAL PROCEDURES:353162789::\"no surgical procedures\"} for *** since the last clinic visit.  Reported surgical complications were ***.      Other health services currently received are primary care***, {OTHER HEALTH SERVICES:623749118}  Current research study participation:  ***.             Past Medical History:  No past medical history on file.    Personal History:  Family History Update:  *** There was no new family history information elicited at this visit  Family History   Problem Relation Age of Onset     Genetic Disorder Mother      OTC deficiency     Genetic Disorder Brother      OTC deficiency   .    Lives with {Household:020997}  Stressors for patient and family: ***  Community resources received currently are {COMMUNITY " RESOURCES:785798168}.  Current insurance status {CURRENT INSURANCE STATUS:777685418}.   Neuropsychological evaluation {NEUROPSYCHOLOGICAL EVALUATION:485976715}.    Behavioral concerns: {BEHAVIORAL CONCERNS:453582672}.    Special education {SPECIAL EDUCATION:357875416} services currently received include {SPECIAL EDUCATION CLASSIFICATION:911106448}.    Education/Vocation: {SCHOOL/WORK CIRCUMSTANCES:431658849}   Social History     Social History     Marital status: Single     Spouse name: N/A     Number of children: N/A     Years of education: N/A     Occupational History     Not on file.     Social History Main Topics     Smoking status: Never Smoker     Smokeless tobacco: Never Used     Alcohol use Not on file     Drug use: Not on file     Sexual activity: Not on file     Other Topics Concern     Not on file     Social History Narrative        I have reviewed Shira s past medical history, family history, social history, medications and allergies as documented in the patient's electronic medical record.  There were no additional findings except as noted.     Review of Systems: ***  Constitional: negative  Eyes: negative - normal vision  Ears/Nose/Throat: negative - normal hearing  Respiratory: negative  Cardiovascular: negative  Gastrointestinal: negative  Genitourinary: negative  Hematologic/Lymphatic: negative  Allergy/Immunologic: negative - no drug allergies  Musculoskeletal: negative  Endocrine: negative  Integument: negative  Neurologic: negative  Psychiatric: negative    Nutrition History:  ***    Medications:  Current Outpatient Prescriptions   Medication Sig Dispense Refill     l-citrulline POWD Take 1.8 g by mouth 4 times daily 250 g 12     glycerol Phenylbutyrate (RAVICTI) 1.1 GM/ML solution Take 3.4 mLs (3.74 g) by mouth 3 times daily With food 306 mL 6     omeprazole (PRILOSEC) 20 MG capsule Take by mouth daily       sertraline (ZOLOFT) 100 MG tablet Take by mouth daily       HYDROcodone-acetaminophen  (VICODIN) 5-500 MG per tablet Take 1-2 tablets by mouth every 6 hours as needed for moderate to severe pain       ondansetron (ZOFRAN-ODT) 4 MG disintegrating tablet Take by mouth every 12 hours as needed for nausea       pimecrolimus (ELIDEL) 1 % cream Apply topically 2 times daily       atenolol (TENORMIN) 50 MG tablet Take 50 mg by mouth daily       ibuprofen (ADVIL,MOTRIN) 400 MG tablet Take 400 mg by mouth every 6 hours as needed for moderate pain       levOCARNitine (CARNITOR) 1 GM/10ML solution Take 5 mLs (500 mg) by mouth 3 times daily 465 mL 11     Multiple Vitamins-Iron (MULTIVITAMIN  /IRON) TABS Take 1 tablet daily.       calcium-vitamin D (CALCIUM 600 + D) 600-400 MG-UNIT per tablet Take 1 tablet by mouth 2 times daily (with meals). 60 tablet 0        Allergies:  Allergies   Allergen Reactions     Compazine [Prochlorperazine]      Erythromycin      Msg [Magnesium Salicylate]         Physical Examination:  There were no vitals taken for this visit.  There is no height or weight on file to calculate BSA.    General: This was a well-developed, well-nourished female who responded appropriately to all requests during the examination.    Head and Neck:  She had hair of normal texture and distribution and her head was proportionate in appearance.The neck was supple without lymphadenopathy.    Eyes:  The pupils were equal, round, and reacted to light.   The conjunctivae were clear.   Ears:  Her ears were normal in architecture and placement.   Nose: The nose was clear.    Mouth and Throat: her dentition was normal.  The throat was without erythema.    Respiratory: The chest was clear to auscultation and had a symmetric appearance.    CV:  On examination of the heart, the rhythm was regular and there was no murmur.  The peripheral pulses were normal.    GI: The abdomen was soft and had normal bowel sounds.  There was no hepatosplenomegaly.    : I deferred a  examination.   Musculoskeletal: There was a full  range of motion on the extremity exam, and normal muscular volume and bulk.   Neurologic: The neurologic exam was normal, with normal cranial nerves, normal deep tendon reflexes, normal sensation, and a normal gait. She had normal tone.   Integument: The skin was normal with no rashes or unusual pigmentation.    Results of laboratory studies collected at this visit and available when this note was completed:   Results for orders placed or performed in visit on 02/23/17   Amino acids plasma quantitative   Result Value Ref Range    A-Aminoadipic <1 0 - 6 umol/dL    Alanine 32 22 - 62 umol/dL    Anserine Negative umol/dL    Arginine 6 2 - 18 umol/dL    Asparagine 4 1 - 5 umol/dL    Aspartic Acid 1 0 - 4 umol/dL    B-Alanine Plasma Negative umol/dL    B-Aminoisobutyric Negative umol/dL    Carnosine Negative umol/dL    Citrulline  0 - 8 umol/dL     2.4  Citrulline Reference Ranges in umol/dL as stated by [place] [employer]:   Premature               2.0-8.7   1-31 days               1.0-4.5   32 days - 23 months     0.3-3.5   2 years - 18 years      0.1-4.6   >19 years               1.2-5.5      Cystathionine Negative umol/dL    Cystine 10 3 - 15 umol/dL    Glutamic Acid 12 0 - 16 umol/dL    Glutamine 68 41 - 86 umol/dL    Glycine 21 13 - 50 umol/dL    Histidine 7 3 - 15 umol/dL    1-Methylhistidine 2 0 - 2 umol/dL    3-Methylhistidine <1 0 - 1 umol/dL    Homocysteine umol/dL Negative umol/dL    Hydroxylysine Negative umol/dL    Hydroxyproline 1 0 - 3 umol/dL    Isoleucine 7 4 - 11 umol/dL    Leucine 12 8 - 21 umol/dL    Lysine 18 6 - 26 umol/dL    Methionine 2 1 - 6 umol/dL    Ornithine 5 2 - 16 umol/dL    Phenylalanine umol/dL 6 3 - 10 umol/dL    Proline 18 0 - 48 umol/dL    Sarcosine Plasma 1 umol/dL    Serine 9 4 - 18 umol/dL    Taurine 6 (L) 7 - 32 umol/dL    Threonine 10 5 - 25 umol/dL    Tyrosine 8 4 - 13 umol/dL    Valine 23 8 - 46 umol/dL   Prealbumin   Result Value Ref Range    Prealbumin 19 15 - 45  mg/dL   Transferrin   Result Value Ref Range    Transferrin 273 210 - 360 mg/dL   Vitamin D Deficiency   Result Value Ref Range    Vitamin D Deficiency screening 16 (L) 20 - 75 ug/L   Carnitine free and total   Result Value Ref Range    Carnitine Free 16 (L)     CarnitineTotal 32 (L)     Carnitine Esterified 16     Carnitine Esterified/Free Ratio 1.0    Comprehensive metabolic panel   Result Value Ref Range    Sodium 139 133 - 144 mmol/L    Potassium 3.6 3.4 - 5.3 mmol/L    Chloride 108 94 - 109 mmol/L    Carbon Dioxide 21 20 - 32 mmol/L    Anion Gap 10 3 - 14 mmol/L    Glucose 95 70 - 99 mg/dL    Urea Nitrogen 7 7 - 30 mg/dL    Creatinine 0.63 0.52 - 1.04 mg/dL    GFR Estimate >90  Non  GFR Calc   >60 mL/min/1.7m2    GFR Estimate If Black >90   GFR Calc   >60 mL/min/1.7m2    Calcium 8.8 8.5 - 10.1 mg/dL    Bilirubin Total 0.5 0.2 - 1.3 mg/dL    Albumin 3.8 3.4 - 5.0 g/dL    Protein Total 7.6 6.8 - 8.8 g/dL    Alkaline Phosphatase 73 40 - 150 U/L    ALT 44 0 - 50 U/L    AST 23 0 - 45 U/L   CBC with platelets differential   Result Value Ref Range    WBC 5.6 4.0 - 11.0 10e9/L    RBC Count 4.75 3.8 - 5.2 10e12/L    Hemoglobin 13.8 11.7 - 15.7 g/dL    Hematocrit 41.6 35.0 - 47.0 %    MCV 88 78 - 100 fl    MCH 29.1 26.5 - 33.0 pg    MCHC 33.2 31.5 - 36.5 g/dL    RDW 13.0 10.0 - 15.0 %    Platelet Count 217 150 - 450 10e9/L    Diff Method Automated Method     % Neutrophils 50.3 %    % Lymphocytes 43.2 %    % Monocytes 4.6 %    % Eosinophils 1.4 %    % Basophils 0.5 %    % Immature Granulocytes 0.0 %    Nucleated RBCs 0 0 /100    Absolute Neutrophil 2.8 1.6 - 8.3 10e9/L    Absolute Lymphocytes 2.4 0.8 - 5.3 10e9/L    Absolute Monocytes 0.3 0.0 - 1.3 10e9/L    Absolute Eosinophils 0.1 0.0 - 0.7 10e9/L    Absolute Basophils 0.0 0.0 - 0.2 10e9/L    Abs Immature Granulocytes 0.0 0 - 0.4 10e9/L    Absolute Nucleated RBC 0.0        CHUN RODRIGUEZ M.D.  Professor and Director   Division of  Genetics and Metabolism  Department of Pediatrics  [facility]    Routed to patient in Comm Mgt  Also to Alex Lewis  ***      Again, thank you for allowing me to participate in the care of your patient.      Sincerely,    Toya Clayton MD

## 2017-08-24 NOTE — LETTER
2017      RE: Shira William  [address] [facility]  [place] [address]       Adult Metabolism Clinic Return Visit  Name:  Shira William  :   1980  MRN:   [id number]  Date of Visit: Aug 24, 2017  PCP: Alex Lewis    Managing Metabolic Center(s):  [facility].    Chief Complaint:  Ms. Shira William is a 36 year old female whom I saw in follow up in Metabolism Clinic for OTC deficiency.  Ms. William was accompanied to this visit by her  , Eran. She also saw our dietitian at this visit.     Assessment:     Although Shira has not had any overt episodes of decompensation, she risks this by having undertaken overly aggressive weight management procedures. While weight loss is desirable for her, the strategies she's employed place her at risk. We talked at length with her about achieving a balance with regard to reducing intake and increasing exercise without excessive employment of either. Shira's carnitine levels were also marginal; she needs to take her dosage on a regular basis.     Plan:    1. Testing ordered at this visit:   Orders Placed This Encounter   Procedures     DIET CONSULT COMPREHENSIVE     Amino acids plasma quantitative     Prealbumin     Vitamin D Deficiency     Carnitine free and total     Comprehensive metabolic panel     CBC with platelets differential   .    Results are as noted, below. Additional recommendations based on these laboratory results:   Values were normal except plasma free carnitine is borderline low.  2. Medications: Continue medications as prescribed; no change in scavenger regimen. Shira should take her carnitine on a regular basis.   3. Metabolic dietician follow up with Jenni Joyner RD, LD at this visit to review special dietary concerns. Metabolic diet should be continued as prescribed.  See nutrition history, below. They discussed:  ---  Nutrition Education:   Discussed and reviewed  "intake; no changes to protein goals.  Encouraged \"balanced meals\" of eating 3 foods groups at every meal (carb + protein + fruit/vegetable).  Discussed methods of weight loss and how chronic under-eating can actually slow metabolism and slow weight loss.  Encouraged 3 meals + 2 snacks/day of moderate portions.  We discussed healthy weight loss of 2 lbs/week or less, and encouraged patient to continue efforts of eliminating pop and increasing fruit and vegetable intake.  Discussed appropriate activity of 30-45 minutes/day  \"most\" days per week (4 days per week) and that she may need to start lower and work up to this.  Reviewed why rapid weight loss and starvation would not be conducive to metabolic control.  Patient stated understanding, had no more questions today.  ---  4.   Continue to observe emergency precautions as previously discussed.  Our on-call metabolic service is available 24 hours/day by calling the page  ([phone]) and asking for the Genetics and Metabolism doctor on call.  5. Return to the Adult Metabolism Clinic in 6 months for follow-up.     6.  We continue to work on strategies to improve access to her medications.  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 She indicates that this resulted in increased fatigue and that she " "felt like she was \"starving\". Both her mom and dad and her  expressed concern about her eating behaviors during this time.    She fell in April and has been undergoing therapy to improve function. She's only now beginning to get back to normal overall. She has been engaged in pool therapy that helped..      Interval History:  hSira was last seen in our clinic on  2/23/17.  Since the last clinic visit Shira was not seen for any urgent clinic visits.  She was not seen in the emergency department.  She currently knows how to contact the 24 hour metabolic specialist on call and has a written emergency letter for this condition.  No hospitalizations occurred.  She had no general anesthesia and no surgical procedures.      Other health services currently received are primary care  Current research study participation: Longitudinal Study of Urea Cycle Disorders    Personal History:  Family History Update:   There was no new family history information elicited at this visit.    Lives with . Currently, they are living in the basement of his grandmother's house. They are there because of difficulty in finding work. Since Eran was released from halfway, finding a job has been difficult. This has caused specific problems because they don't have full access to the house utilities and the shower that's available to them in the basement doesn't work. They've mostly been showering in the health club.    Concern is been raised about access to her amino acid supplements. Currently, materials for supplementation are provided by the [facility], but her father has been preparing capsules for her administration. This is not tenable a long-term basis for the family.    Current insurance status state/federal program (Medicaid/Medicare).      I have reviewed Shira s past medical history, family history, social history, medications and allergies as documented in the patient's electronic medical record.  There were " no additional findings except as noted.     Review of Systems:   Constitional: Overweight  Eyes: wears glasses  Ears/Nose/Throat: negative - normal hearing  Respiratory: Occasionally needs albuterol  Cardiovascular: On atenolol  Gastrointestinal: Irritable bowel symptoms, see about  Genitourinary: negative  Musculoskeletal: negative  Endocrine: negative  Hematologic/Lymphatic/Immunologic: negative  Integument: Vitiligo  Neurologic: intellectual disability  Psychiatric: Anxiety and some depression, currently on medications    Nutrition History:  Patient is on a protein-restricted diet of 40-45 g/day.      Patient brings in a food log today.  Typical intake:  Breakfast: cereal/banana, French toast stix, poached egg with toast, coffee  Lunch: BBQ sandwich with salad, taco, or cheeseburger with cantaloup  Dinner: chicken noodle soup with crackers, potato salad and green beans, pork chop and baked potato      Patient is eating more cantaloupe, and salads.   and parents are concerned that she became too focused on weight loss over past 6 months, not eating enough and working out for long periods of time to speed weight loss.  She states she stopped drinking pop and started eating more fruits.      Medications:  Current Outpatient Prescriptions   Medication Sig Dispense Refill     l-citrulline POWD Take 1.8 g by mouth 4 times daily 250 g 12     glycerol Phenylbutyrate (RAVICTI) 1.1 GM/ML solution Take 3.4 mLs (3.74 g) by mouth 3 times daily With food 306 mL 6     omeprazole (PRILOSEC) 20 MG capsule Take by mouth daily       sertraline (ZOLOFT) 100 MG tablet Take by mouth daily       HYDROcodone-acetaminophen (VICODIN) 5-500 MG per tablet Take 1-2 tablets by mouth every 6 hours as needed for moderate to severe pain       ondansetron (ZOFRAN-ODT) 4 MG disintegrating tablet Take by mouth every 12 hours as needed for nausea       pimecrolimus (ELIDEL) 1 % cream Apply topically 2 times daily       atenolol (TENORMIN) 50  "MG tablet Take 50 mg by mouth daily       ibuprofen (ADVIL,MOTRIN) 400 MG tablet Take 400 mg by mouth every 6 hours as needed for moderate pain       levOCARNitine (CARNITOR) 1 GM/10ML solution Take 5 mLs (500 mg) by mouth 3 times daily 465 mL 11     Multiple Vitamins-Iron (MULTIVITAMIN  /IRON) TABS Take 1 tablet daily.       calcium-vitamin D (CALCIUM 600 + D) 600-400 MG-UNIT per tablet Take 1 tablet by mouth 2 times daily (with meals). 60 tablet 0     Allergies:  Allergies   Allergen Reactions     Compazine [Prochlorperazine]      Erythromycin      Msg [Magnesium Salicylate]      Physical Examination:  Blood pressure 126/84, pulse 69, height 1.651 m (5' 5\"), weight 101.8 kg (224 lb 6.4 oz).  Body surface area is 2.16 meters squared.    General: This was a well-developed, well-nourished female who responded appropriately to all requests during the examination.    Head and Neck:  She had hair of normal texture and distribution and her head was proportionate in appearance.The neck was supple without lymphadenopathy.    Eyes:  The pupils were equal, round, and reacted to light.   The conjunctivae were clear.   Ears:  Her ears were normal in architecture and placement.   Nose: The nose was clear.    Mouth and Throat: her dentition was normal.  The throat was without erythema.    Respiratory: The chest was clear to auscultation and had a symmetric appearance.    CV:  On examination of the heart, the rhythm was regular and there was no murmur.  The peripheral pulses were normal.    GI: The abdomen was soft and had normal bowel sounds.  There was no hepatosplenomegaly.    : I deferred a  examination.   Musculoskeletal: There was a full range of motion on the extremity exam, and normal muscular volume and bulk.   Neurologic: The neurologic exam was normal, with normal cranial nerves, normal deep tendon reflexes, normal sensation, and a normal gait. She had normal tone.   Integument: The skin was normal with no rashes " or unusual pigmentation.    Results of laboratory studies collected at this visit and available when this note was completed:   Component Value Flag Ref Range Units Status Collected Lab     Carnitine Free 16 (L) 25 - 60 umol/L Final 08/24/2017  5:22 PM 1740   CarnitineTotal 30 (L) 34 - 86 umol/L Final 08/24/2017  5:22 PM 1740     Vitamin D Deficiency screening 42  20 - 75 ug/L Final 08/24/2017  5:22 PM 51     Prealbumin 17  15 - 45 mg/dL Final 08/24/2017  5:22 PM 1740     Component Value Flag Ref Range Units Status Collected Lab   Sodium 137  133 - 144 mmol/L Final 08/24/2017  5:22 PM 1740   Potassium 3.7  3.4 - 5.3 mmol/L Final 08/24/2017  5:22 PM 1740   Chloride 104  94 - 109 mmol/L Final 08/24/2017  5:22 PM 1740   Carbon Dioxide 25  20 - 32 mmol/L Final 08/24/2017  5:22 PM 1740   Anion Gap 8  3 - 14 mmol/L Final 08/24/2017  5:22 PM 1740   Glucose 85  70 - 99 mg/dL Final 08/24/2017  5:22 PM 1740   Urea Nitrogen 11  7 - 30 mg/dL Final 08/24/2017  5:22 PM 1740   Creatinine 0.71  0.52 - 1.04 mg/dL Final 08/24/2017  5:22 PM 1740   GFR Estimate >90  >60 mL/min/1.7m2 Final 08/24/2017  5:22 PM 1740   Comment:   Non  GFR Calc   GFR Estimate If Black >90  >60 mL/min/1.7m2 Final 08/24/2017  5:22 PM 1740   Comment:   African American GFR Calc   Calcium 8.9  8.5 - 10.1 mg/dL Final 08/24/2017  5:22 PM 1740   Bilirubin Total 0.3  0.2 - 1.3 mg/dL Final 08/24/2017  5:22 PM 1740   Albumin 3.5  3.4 - 5.0 g/dL Final 08/24/2017  5:22 PM 1740   Protein Total 7.8  6.8 - 8.8 g/dL Final 08/24/2017  5:22 PM 1740   Alkaline Phosphatase 75  40 - 150 U/L Final 08/24/2017  5:22 PM 1740   ALT 21  0 - 50 U/L Final 08/24/2017  5:22 PM 1740   AST 11  0 - 45 U/L Final 08/24/2017  5:22 PM 1740     WBC 6.7  4.0 - 11.0 10e9/L Final 08/24/2017  5:22 PM 1740   RBC Count 4.74  3.8 - 5.2 10e12/L Final 08/24/2017  5:22 PM 1740   Hemoglobin 12.2  11.7 - 15.7 g/dL Final 08/24/2017  5:22 PM 1740   Hematocrit 38.9  35.0 - 47.0 % Final  08/24/2017  5:22 PM 1740   MCV 82  78 - 100 fl Final 08/24/2017  5:22 PM 1740   MCH 25.7 (L) 26.5 - 33.0 pg Final 08/24/2017  5:22 PM 1740   MCHC 31.4 (L) 31.5 - 36.5 g/dL Final 08/24/2017  5:22 PM 1740   RDW 15.4 (H) 10.0 - 15.0 % Final 08/24/2017  5:22 PM 1740   Platelet Count 229  150 - 450 10e9/L Final 08/24/2017  5:22 PM 1740   Diff Method     Final 08/24/2017  5:22 PM 1740   Automated Method   % Neutrophils 54.9   % Final 08/24/2017  5:22 PM 1740   % Lymphocytes 38.2   % Final 08/24/2017  5:22 PM 1740   % Monocytes 5.3   % Final 08/24/2017  5:22 PM 1740   % Eosinophils 0.8   % Final 08/24/2017  5:22 PM 1740   % Basophils 0.6   % Final 08/24/2017  5:22 PM 1740   % Immature Granulocytes 0.2   % Final 08/24/2017  5:22 PM 1740   Nucleated RBCs 0  0 /100 Final 08/24/2017  5:22 PM 1740   Absolute Neutrophil 3.7  1.6 - 8.3 10e9/L Final 08/24/2017  5:22 PM 1740   Absolute Lymphocytes 2.5  0.8 - 5.3 10e9/L Final 08/24/2017  5:22 PM 1740   Absolute Monocytes 0.4  0.0 - 1.3 10e9/L Final 08/24/2017  5:22 PM 1740   Absolute Eosinophils 0.1  0.0 - 0.7 10e9/L Final 08/24/2017  5:22 PM 1740   Absolute Basophils 0.0  0.0 - 0.2 10e9/L Final 08/24/2017  5:22 PM 1740   Abs Immature Granulocytes 0.0  0 - 0.4 10e9/L Final 08/24/2017  5:22 PM 1740   Absolute Nucleated RBC 0.0    Final 08/24/2017  5:22 PM 1740     Component Value Flag Ref Range Units Status Collected Lab   A-Aminoadipic <1  0 - 6 umol/dL Final 08/24/2017  5:22 PM 51   Alanine 28  22 - 62 umol/dL Final 08/24/2017  5:22 PM 51   Anserine Negative   umol/dL Final 08/24/2017  5:22 PM 51   Arginine 4  2 - 18 umol/dL Final 08/24/2017  5:22 PM 51   Asparagine 4  1 - 5 umol/dL Final 08/24/2017  5:22 PM 51   Aspartic Acid <1  0 - 4 umol/dL Final 08/24/2017  5:22 PM 51   B-Alanine Plasma Negative   umol/dL Final 08/24/2017  5:22 PM 51   B-Aminoisobutyric Negative   umol/dL Final 08/24/2017  5:22 PM 51   Carnosine Negative   umol/dL Final 08/24/2017  5:22 PM 51   Citrulline 1.4   1.3 - 6.0 umol/dL Final 08/24/2017  5:22 PM 51   Cystathionine Negative   umol/dL Final 08/24/2017  5:22 PM 51   Cystine 9  3 - 15 umol/dL Final 08/24/2017  5:22 PM 51   Glutamic Acid 6  0 - 16 umol/dL Final 08/24/2017  5:22 PM 51   Glutamine 62  41 - 86 umol/dL Final 08/24/2017  5:22 PM 51   Glycine 14  13 - 50 umol/dL Final 08/24/2017  5:22 PM 51   Histidine 6  3 - 15 umol/dL Final 08/24/2017  5:22 PM 51   1-Methylhistidine 3 (H) 0 - 2 umol/dL Final 08/24/2017  5:22 PM 51   3-Methylhistidine <1  0 - 1 umol/dL Final 08/24/2017  5:22 PM 51   Homocysteine umol/dL Negative   umol/dL Final 08/24/2017  5:22 PM 51   Hydroxylysine Negative   umol/dL Final 08/24/2017  5:22 PM 51   Hydroxyproline 2  0 - 3 umol/dL Final 08/24/2017  5:22 PM 51   Isoleucine 7  4 - 11 umol/dL Final 08/24/2017  5:22 PM 51   Leucine 12  8 - 21 umol/dL Final 08/24/2017  5:22 PM 51   Lysine 17  6 - 26 umol/dL Final 08/24/2017  5:22 PM 51   Methionine 3  1 - 6 umol/dL Final 08/24/2017  5:22 PM 51   Ornithine 5  2 - 16 umol/dL Final 08/24/2017  5:22 PM 51   Phenylalanine umol/dL 5  3 - 10 umol/dL Final 08/24/2017  5:22 PM 51   Proline 17  0 - 48 umol/dL Final 08/24/2017  5:22 PM 51   Sarcosine Plasma Negative   umol/dL Final 08/24/2017  5:22 PM 51   Serine 7  4 - 18 umol/dL Final 08/24/2017  5:22 PM 51   Taurine 6 (L) 7 - 32 umol/dL Final 08/24/2017  5:22 PM 51   Threonine 9  5 - 25 umol/dL Final 08/24/2017  5:22 PM 51   Tyrosine 8  4 - 13 umol/dL Final 08/24/2017  5:22 PM 51   Valine 25  8 - 46 umol/dL Final 08/24/2017  5:22 PM 51   CHUN RODRIGUEZ M.D.  Professor and Director   Division of Genetics and Metabolism  Department of Pediatrics  [facility]    Routed to patient in Comm [facility]gt  Also to Alex Lewis

## 2017-08-24 NOTE — PROGRESS NOTES
"Adult Metabolism Clinic Return Visit  Name:  Shira William  :   1980  MRN:   [id number]  Date of Visit: Aug 24, 2017  PCP: Alex Lewis    Managing Metabolic Center(s):  [facility].    Chief Complaint:  Ms. Shira William is a 36 year old female whom I saw in follow up in Metabolism Clinic for OTC deficiency.  Ms. William was accompanied to this visit by her  , Eran. She also saw our dietitian at this visit.     Assessment:     Although Shira has not had any overt episodes of decompensation, she risks this by having undertaken overly aggressive weight management procedures. While weight loss is desirable for her, the strategies she's employed place her at risk. We talked at length with her about achieving a balance with regard to reducing intake and increasing exercise without excessive employment of either. Shira's carnitine levels were also marginal; she needs to take her dosage on a regular basis.     Plan:    1. Testing ordered at this visit:   Orders Placed This Encounter   Procedures     DIET CONSULT COMPREHENSIVE     Amino acids plasma quantitative     Prealbumin     Vitamin D Deficiency     Carnitine free and total     Comprehensive metabolic panel     CBC with platelets differential   .    Results are as noted, below. Additional recommendations based on these laboratory results:   Values were normal except plasma free carnitine is borderline low.  2. Medications: Continue medications as prescribed; no change in scavenger regimen. Shira should take her carnitine on a regular basis.   3. Metabolic dietician follow up with Jenni Joyner RD, LD at this visit to review special dietary concerns. Metabolic diet should be continued as prescribed.  See nutrition history, below. They discussed:  ---  Nutrition Education:   Discussed and reviewed intake; no changes to protein goals.  Encouraged \"balanced meals\" of eating 3 foods " "groups at every meal (carb + protein + fruit/vegetable).  Discussed methods of weight loss and how chronic under-eating can actually slow metabolism and slow weight loss.  Encouraged 3 meals + 2 snacks/day of moderate portions.  We discussed healthy weight loss of 2 lbs/week or less, and encouraged patient to continue efforts of eliminating pop and increasing fruit and vegetable intake.  Discussed appropriate activity of 30-45 minutes/day  \"most\" days per week (4 days per week) and that she may need to start lower and work up to this.  Reviewed why rapid weight loss and starvation would not be conducive to metabolic control.  Patient stated understanding, had no more questions today.  ---  4.   Continue to observe emergency precautions as previously discussed.  Our on-call metabolic service is available 24 hours/day by calling the page  ([phone]) and asking for the Genetics and Metabolism doctor on call.  5. Return to the Adult Metabolism Clinic in 6 months for follow-up.     6.  We continue to work on strategies to improve access to her medications.  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 She indicates that this resulted in increased fatigue and that she felt like she was \"starving\". Both her mom and dad and her  expressed concern " about her eating behaviors during this time.    She fell in April and has been undergoing therapy to improve function. She's only now beginning to get back to normal overall. She has been engaged in pool therapy that helped..      Interval History:  Shira was last seen in our clinic on  2/23/17.  Since the last clinic visit Shira was not seen for any urgent clinic visits.  She was not seen in the emergency department.  She currently knows how to contact the 24 hour metabolic specialist on call and has a written emergency letter for this condition.  No hospitalizations occurred.  She had no general anesthesia and no surgical procedures.      Other health services currently received are primary care  Current research study participation: Longitudinal Study of Urea Cycle Disorders    Personal History:  Family History Update:   There was no new family history information elicited at this visit.    Lives with . Currently, they are living in the basement of his grandmother's house. They are there because of difficulty in finding work. Since Eran was released from half-way, finding a job has been difficult. This has caused specific problems because they don't have full access to the house utilities and the shower that's available to them in the basement doesn't work. They've mostly been showering in the health club.    Concern is been raised about access to her amino acid supplements. Currently, materials for supplementation are provided by the [facility], but her father has been preparing capsules for her administration. This is not tenable a long-term basis for the family.    Current insurance status state/federal program (Medicaid/Medicare).      I have reviewed Shira s past medical history, family history, social history, medications and allergies as documented in the patient's electronic medical record.  There were no additional findings except as noted.     Review of Systems:   Constitional:  Overweight  Eyes: wears glasses  Ears/Nose/Throat: negative - normal hearing  Respiratory: Occasionally needs albuterol  Cardiovascular: On atenolol  Gastrointestinal: Irritable bowel symptoms, see about  Genitourinary: negative  Musculoskeletal: negative  Endocrine: negative  Hematologic/Lymphatic/Immunologic: negative  Integument: Vitiligo  Neurologic: intellectual disability  Psychiatric: Anxiety and some depression, currently on medications    Nutrition History:  Patient is on a protein-restricted diet of 40-45 g/day.      Patient brings in a food log today.  Typical intake:  Breakfast: cereal/banana, French toast stix, poached egg with toast, coffee  Lunch: BBQ sandwich with salad, taco, or cheeseburger with cantaloup  Dinner: chicken noodle soup with crackers, potato salad and green beans, pork chop and baked potato      Patient is eating more cantaloupe, and salads.   and parents are concerned that she became too focused on weight loss over past 6 months, not eating enough and working out for long periods of time to speed weight loss.  She states she stopped drinking pop and started eating more fruits.      Medications:  Current Outpatient Prescriptions   Medication Sig Dispense Refill     l-citrulline POWD Take 1.8 g by mouth 4 times daily 250 g 12     glycerol Phenylbutyrate (RAVICTI) 1.1 GM/ML solution Take 3.4 mLs (3.74 g) by mouth 3 times daily With food 306 mL 6     omeprazole (PRILOSEC) 20 MG capsule Take by mouth daily       sertraline (ZOLOFT) 100 MG tablet Take by mouth daily       HYDROcodone-acetaminophen (VICODIN) 5-500 MG per tablet Take 1-2 tablets by mouth every 6 hours as needed for moderate to severe pain       ondansetron (ZOFRAN-ODT) 4 MG disintegrating tablet Take by mouth every 12 hours as needed for nausea       pimecrolimus (ELIDEL) 1 % cream Apply topically 2 times daily       atenolol (TENORMIN) 50 MG tablet Take 50 mg by mouth daily       ibuprofen (ADVIL,MOTRIN) 400 MG  "tablet Take 400 mg by mouth every 6 hours as needed for moderate pain       levOCARNitine (CARNITOR) 1 GM/10ML solution Take 5 mLs (500 mg) by mouth 3 times daily 465 mL 11     Multiple Vitamins-Iron (MULTIVITAMIN  /IRON) TABS Take 1 tablet daily.       calcium-vitamin D (CALCIUM 600 + D) 600-400 MG-UNIT per tablet Take 1 tablet by mouth 2 times daily (with meals). 60 tablet 0        Allergies:  Allergies   Allergen Reactions     Compazine [Prochlorperazine]      Erythromycin      Msg [Magnesium Salicylate]         Physical Examination:  Blood pressure 126/84, pulse 69, height 1.651 m (5' 5\"), weight 101.8 kg (224 lb 6.4 oz).  Body surface area is 2.16 meters squared.    General: This was a well-developed, well-nourished female who responded appropriately to all requests during the examination.    Head and Neck:  She had hair of normal texture and distribution and her head was proportionate in appearance.The neck was supple without lymphadenopathy.    Eyes:  The pupils were equal, round, and reacted to light.   The conjunctivae were clear.   Ears:  Her ears were normal in architecture and placement.   Nose: The nose was clear.    Mouth and Throat: her dentition was normal.  The throat was without erythema.    Respiratory: The chest was clear to auscultation and had a symmetric appearance.    CV:  On examination of the heart, the rhythm was regular and there was no murmur.  The peripheral pulses were normal.    GI: The abdomen was soft and had normal bowel sounds.  There was no hepatosplenomegaly.    : I deferred a  examination.   Musculoskeletal: There was a full range of motion on the extremity exam, and normal muscular volume and bulk.   Neurologic: The neurologic exam was normal, with normal cranial nerves, normal deep tendon reflexes, normal sensation, and a normal gait. She had normal tone.   Integument: The skin was normal with no rashes or unusual pigmentation.    Results of laboratory studies collected " at this visit and available when this note was completed:   Component Value Flag Ref Range Units Status Collected Lab     Carnitine Free 16 (L) 25 - 60 umol/L Final 08/24/2017  5:22 PM 1740   CarnitineTotal 30 (L) 34 - 86 umol/L Final 08/24/2017  5:22 PM 1740     Vitamin D Deficiency screening 42  20 - 75 ug/L Final 08/24/2017  5:22 PM 51     Prealbumin 17  15 - 45 mg/dL Final 08/24/2017  5:22 PM 1740     Component Value Flag Ref Range Units Status Collected Lab   Sodium 137  133 - 144 mmol/L Final 08/24/2017  5:22 PM 1740   Potassium 3.7  3.4 - 5.3 mmol/L Final 08/24/2017  5:22 PM 1740   Chloride 104  94 - 109 mmol/L Final 08/24/2017  5:22 PM 1740   Carbon Dioxide 25  20 - 32 mmol/L Final 08/24/2017  5:22 PM 1740   Anion Gap 8  3 - 14 mmol/L Final 08/24/2017  5:22 PM 1740   Glucose 85  70 - 99 mg/dL Final 08/24/2017  5:22 PM 1740   Urea Nitrogen 11  7 - 30 mg/dL Final 08/24/2017  5:22 PM 1740   Creatinine 0.71  0.52 - 1.04 mg/dL Final 08/24/2017  5:22 PM 1740   GFR Estimate >90  >60 mL/min/1.7m2 Final 08/24/2017  5:22 PM 1740   Comment:   Non  GFR Calc   GFR Estimate If Black >90  >60 mL/min/1.7m2 Final 08/24/2017  5:22 PM 1740   Comment:   African American GFR Calc   Calcium 8.9  8.5 - 10.1 mg/dL Final 08/24/2017  5:22 PM 1740   Bilirubin Total 0.3  0.2 - 1.3 mg/dL Final 08/24/2017  5:22 PM 1740   Albumin 3.5  3.4 - 5.0 g/dL Final 08/24/2017  5:22 PM 1740   Protein Total 7.8  6.8 - 8.8 g/dL Final 08/24/2017  5:22 PM 1740   Alkaline Phosphatase 75  40 - 150 U/L Final 08/24/2017  5:22 PM 1740   ALT 21  0 - 50 U/L Final 08/24/2017  5:22 PM 1740   AST 11  0 - 45 U/L Final 08/24/2017  5:22 PM 1740     WBC 6.7  4.0 - 11.0 10e9/L Final 08/24/2017  5:22 PM 1740   RBC Count 4.74  3.8 - 5.2 10e12/L Final 08/24/2017  5:22 PM 1740   Hemoglobin 12.2  11.7 - 15.7 g/dL Final 08/24/2017  5:22 PM 1740   Hematocrit 38.9  35.0 - 47.0 % Final 08/24/2017  5:22 PM 1740   MCV 82  78 - 100 fl Final 08/24/2017  5:22 PM  1740   MCH 25.7 (L) 26.5 - 33.0 pg Final 08/24/2017  5:22 PM 1740   MCHC 31.4 (L) 31.5 - 36.5 g/dL Final 08/24/2017  5:22 PM 1740   RDW 15.4 (H) 10.0 - 15.0 % Final 08/24/2017  5:22 PM 1740   Platelet Count 229  150 - 450 10e9/L Final 08/24/2017  5:22 PM 1740   Diff Method     Final 08/24/2017  5:22 PM 1740   Automated Method   % Neutrophils 54.9   % Final 08/24/2017  5:22 PM 1740   % Lymphocytes 38.2   % Final 08/24/2017  5:22 PM 1740   % Monocytes 5.3   % Final 08/24/2017  5:22 PM 1740   % Eosinophils 0.8   % Final 08/24/2017  5:22 PM 1740   % Basophils 0.6   % Final 08/24/2017  5:22 PM 1740   % Immature Granulocytes 0.2   % Final 08/24/2017  5:22 PM 1740   Nucleated RBCs 0  0 /100 Final 08/24/2017  5:22 PM 1740   Absolute Neutrophil 3.7  1.6 - 8.3 10e9/L Final 08/24/2017  5:22 PM 1740   Absolute Lymphocytes 2.5  0.8 - 5.3 10e9/L Final 08/24/2017  5:22 PM 1740   Absolute Monocytes 0.4  0.0 - 1.3 10e9/L Final 08/24/2017  5:22 PM 1740   Absolute Eosinophils 0.1  0.0 - 0.7 10e9/L Final 08/24/2017  5:22 PM 1740   Absolute Basophils 0.0  0.0 - 0.2 10e9/L Final 08/24/2017  5:22 PM 1740   Abs Immature Granulocytes 0.0  0 - 0.4 10e9/L Final 08/24/2017  5:22 PM 1740   Absolute Nucleated RBC 0.0    Final 08/24/2017  5:22 PM 1740     Component Value Flag Ref Range Units Status Collected Lab   A-Aminoadipic <1  0 - 6 umol/dL Final 08/24/2017  5:22 PM 51   Alanine 28  22 - 62 umol/dL Final 08/24/2017  5:22 PM 51   Anserine Negative   umol/dL Final 08/24/2017  5:22 PM 51   Arginine 4  2 - 18 umol/dL Final 08/24/2017  5:22 PM 51   Asparagine 4  1 - 5 umol/dL Final 08/24/2017  5:22 PM 51   Aspartic Acid <1  0 - 4 umol/dL Final 08/24/2017  5:22 PM 51   B-Alanine Plasma Negative   umol/dL Final 08/24/2017  5:22 PM 51   B-Aminoisobutyric Negative   umol/dL Final 08/24/2017  5:22 PM 51   Carnosine Negative   umol/dL Final 08/24/2017  5:22 PM 51   Citrulline 1.4  1.3 - 6.0 umol/dL Final 08/24/2017  5:22 PM 51   Cystathionine Negative    umol/dL Final 08/24/2017  5:22 PM 51   Cystine 9  3 - 15 umol/dL Final 08/24/2017  5:22 PM 51   Glutamic Acid 6  0 - 16 umol/dL Final 08/24/2017  5:22 PM 51   Glutamine 62  41 - 86 umol/dL Final 08/24/2017  5:22 PM 51   Glycine 14  13 - 50 umol/dL Final 08/24/2017  5:22 PM 51   Histidine 6  3 - 15 umol/dL Final 08/24/2017  5:22 PM 51   1-Methylhistidine 3 (H) 0 - 2 umol/dL Final 08/24/2017  5:22 PM 51   3-Methylhistidine <1  0 - 1 umol/dL Final 08/24/2017  5:22 PM 51   Homocysteine umol/dL Negative   umol/dL Final 08/24/2017  5:22 PM 51   Hydroxylysine Negative   umol/dL Final 08/24/2017  5:22 PM 51   Hydroxyproline 2  0 - 3 umol/dL Final 08/24/2017  5:22 PM 51   Isoleucine 7  4 - 11 umol/dL Final 08/24/2017  5:22 PM 51   Leucine 12  8 - 21 umol/dL Final 08/24/2017  5:22 PM 51   Lysine 17  6 - 26 umol/dL Final 08/24/2017  5:22 PM 51   Methionine 3  1 - 6 umol/dL Final 08/24/2017  5:22 PM 51   Ornithine 5  2 - 16 umol/dL Final 08/24/2017  5:22 PM 51   Phenylalanine umol/dL 5  3 - 10 umol/dL Final 08/24/2017  5:22 PM 51   Proline 17  0 - 48 umol/dL Final 08/24/2017  5:22 PM 51   Sarcosine Plasma Negative   umol/dL Final 08/24/2017  5:22 PM 51   Serine 7  4 - 18 umol/dL Final 08/24/2017  5:22 PM 51   Taurine 6 (L) 7 - 32 umol/dL Final 08/24/2017  5:22 PM 51   Threonine 9  5 - 25 umol/dL Final 08/24/2017  5:22 PM 51   Tyrosine 8  4 - 13 umol/dL Final 08/24/2017  5:22 PM 51   Valine 25  8 - 46 umol/dL Final 08/24/2017  5:22 PM 51   CHUN RODRIGUEZ M.D.  Professor and Director   Division of Genetics and Metabolism  Department of Pediatrics  [facility]    Routed to patient in Comm Mgt  Also to Alex Lewis

## 2017-08-25 LAB — DEPRECATED CALCIDIOL+CALCIFEROL SERPL-MC: 42 UG/L (ref 20–75)

## 2017-08-28 LAB
(HCYS)2 SERPL-SCNC: NEGATIVE UMOL/DL
1ME-HIST SERPL-SCNC: 3 UMOL/DL (ref 0–2)
3ME-HISTIDINE SERPL-SCNC: <1 UMOL/DL (ref 0–1)
AAA SERPL-SCNC: <1 UMOL/DL (ref 0–6)
ALANINE SERPL-SCNC: NEGATIVE UMOL/DL
ALANINE SFR SERPL: 28 UMOL/DL (ref 22–62)
ANSERINE SERPL-SCNC: NEGATIVE UMOL/DL
ARGININE SERPL-SCNC: 4 UMOL/DL (ref 2–18)
ASPARAGINE SERPL-SCNC: 4 UMOL/DL (ref 1–5)
ASPARTATE SERPL-SCNC: <1 UMOL/DL (ref 0–4)
B-AIB SERPL-SCNC: NEGATIVE UMOL/DL
CARNOSINE SERPL-SCNC: NEGATIVE UMOL/DL
CITRULLINE SERPL-SCNC: 1.4 UMOL/DL (ref 1.3–6)
CYSTATHIONIN SERPL-SCNC: NEGATIVE UMOL/DL
CYSTINE SERPL-SCNC: 9 UMOL/DL (ref 3–15)
GLUTAMATE SERPL-SCNC: 6 UMOL/DL (ref 0–16)
GLUTAMATE SERPL-SCNC: 62 UMOL/DL (ref 41–86)
GLYCINE SERPL-SCNC: 14 UMOL/DL (ref 13–50)
HISTIDINE SERPL-SCNC: 6 UMOL/DL (ref 3–15)
ISOLEUCINE SERPL-SCNC: 7 UMOL/DL (ref 4–11)
LEUCINE SERPL-SCNC: 12 UMOL/DL (ref 8–21)
LYSINE SERPL-SCNC: 17 UMOL/DL (ref 6–26)
METHIONINE SERPL-SCNC: 3 UMOL/DL (ref 1–6)
OH-LYSINE SERPL-SCNC: NEGATIVE UMOL/DL
OH-PROLINE SERPL-SCNC: 2 UMOL/DL (ref 0–3)
ORNITHINE SERPL-SCNC: 5 UMOL/DL (ref 2–16)
PHE SERPL-SCNC: 5 UMOL/DL (ref 3–10)
PROLINE SERPL-SCNC: 17 UMOL/DL (ref 0–48)
SARCOSINE SERPL-SCNC: NEGATIVE UMOL/DL
SERINE SERPL-SCNC: 7 UMOL/DL (ref 4–18)
TAURINE SERPL-SCNC: 6 UMOL/DL (ref 7–32)
THREONINE SERPL-SCNC: 9 UMOL/DL (ref 5–25)
TYROSINE SERPL-SCNC: 8 UMOL/DL (ref 4–13)
VALINE SERPL-SCNC: 25 UMOL/DL (ref 8–46)

## 2017-08-28 NOTE — PROGRESS NOTES
CLINICAL NUTRITION SERVICES - PEDIATRIC ASSESSMENT NOTE      REASON FOR ASSESSMENT  Shira William is a 36 year old female seen by the dietitian for consult regarding OTC deficiency.      ANTHROPOMETRICS  Height: 165.2 cm  Weight: 101.8 kg  BMI: 37  Ideal body weight: 55 kg     Comments: weight down ~14 lbs since last visit in December      NUTRITION HISTORY  Patient is on a protein-restricted diet of 40-45 g/day.      Patient brings in a food log today.  Typical intake:     Breakfast: cereal/banana, Bengali toast stix, poached egg with toast, coffee  Lunch: BBQ sandwich with salad, taco, or cheeseburger with cantaloup  Dinner: chicken noodle soup with crackers, potato salad and green beans, pork chop and baked potato     Patient is eating more cantaloupe, and salads.   and parents are concerned that she became too focused on weight loss over past 6 months, not eating enough and working out for long periods of time to speed weight loss.  She states she stopped drinking pop and started eating more fruits.      LABS  Labs reviewed;       Amino Acids          Pending from visit   Prealbumin - 17 (19)   BUN - 11   Vitamin D - 42      MEDICATIONS  Medications reviewed; includes MVI, calcium/Vit D (600/400) bid, Ravicti, citrulline      ASSESSED NUTRITION NEEDS:  Estimated Energy Needs: 20-25 kcal/kg  Estimated Protein Needs: 0.6-0.8 g/kg using IBW  Micronutrient Needs: 1200 mg calcium, 600 IU Vitamin D, 18 mg iron      NUTRITION DIAGNOSIS:  Impaired nutrient utilization related to diagnosis of OTC deficiency as evidenced by risk of hyperammonemia with stress/illness, catabolism, or very high protein intake.      Obesity related to excessive calorie intake and lack of exercise as evidenced by BMI in obese range.      INTERVENTIONS  Nutrition Prescription  Meet 100% estimated kcal, protein, amino acid, vitamin/mineral needs through low protein diet    Nutrition Education:   Discussed and reviewed intake; no  "changes to protein goals.  Encouraged \"balanced meals\" of eating 3 foods groups at every meal (carb + protein + fruit/vegetable).  Discussed methods of weight loss and how chronic under-eating can actually slow metabolism and slow weight loss.  Encouraged 3 meals + 2 snacks/day of moderate portions.  We discussed healthy weight loss of 2 lbs/week or less, and encouraged patient to continue efforts of eliminating pop and increasing fruit and vegetable intake.  Discussed appropriate activity of 30-45 minutes/day  \"most\" days per week (4 days per week) and that she may need to start lower and work up to this.  Reviewed why rapid weight loss and starvation would not be conducive to metabolic control.  Patient stated understanding, had no more questions today.    Goals  1. Gradual weight loss/lower BMI  2. Meet >85% estimated nutrition needs through low protein diet  3. Ammonia/GLUT levels WNL    FOLLOW UP/MONITORING  Energy Intake  Macronutrient intake  Anthropometric measurements      Time spent with patient: 15 minutes  "

## 2017-08-29 LAB
ACYLCARNITINE SERPL-SCNC: 14 UMOL/L (ref 5–29)
CARN ESTERS/C0 SERPL-SRTO: 0.9 {RATIO} (ref 0.1–1)
CARNITINE FREE SERPL-SCNC: 16 UMOL/L (ref 25–60)
CARNITINE SERPL-SCNC: 30 UMOL/L (ref 34–86)

## 2017-10-24 ENCOUNTER — TELEPHONE (OUTPATIENT)
Dept: PEDIATRICS | Facility: CLINIC | Age: 37
End: 2017-10-24

## 2017-10-24 NOTE — TELEPHONE ENCOUNTER
"Patient's mother left  requesting return call due to new diagnosis of Lyme disease for patient. Returned call to patient's mother (authorization on file consenting to allow communication to/from parents). Patient's mother reports that patient has been having problems with her knees since April, mainly right knee and was drained once. More recently left knee began to swell and have a lot of pain. Patient indicated to her mother that she didn't think the doctor that she was seeing would care. Mother brought her in to see her orthopedic provider. He reportedly did testing re: rheumatologic disease and Lyme disease. Reportedly testing came back \"positive\" for Lyme diease. Both knees reportedly have been still/painful in last week and patient has had some fatigue. Denies concerning signs/symptoms related to hyperammonemia. Has early afternoon appt with PCP, Dr. Lewis today and mother worried this could cause problems with her OTC. Reinfoced to patient's mother that it would be important for patient to treat underlying Lyme disease and that it is typically treated with antibiotics, but we'd have to defer to Dr. Lewis regarding his expertise in treating. Reviewed with patient's mother that Dr. Lewis could certainly call our on-call with questions/concerns related to treatment for Lyme disease in combination with her underlying OTC deficiency. Provided reassurance that it has been good that patient has not had any concerning signs/symptoms of hyperammonemia and encouraged patient's mother to continue to monitor patient. Reinforced to patient's mother that writer would notify Dr. Clayton of this diagnosis. Patient's mother verbalized understanding. Encouraged her to connect with us should she have additional concerns and could certainly update us as to what treatment/management patient will have for Lyme disease. Patient's mother verbalized understanding. Patient later called writer to ask whether we needed anything special " "from Dr. Lewis. Indicated to patient that we did not need anything, however, if patient wanted to update with plan writer would update Dr. Clayton. Patient verbalized understanding.    Addendum: Patient called back after her appt with her PCP. Relayed she was put on oral antibiotics (Doxycycline 100 mg BID) for 21 days and that PCP reportedly told her that it looks like she's had Lyme disease for about 6 months and he is planning to monitor her every 2 weeks with lab draw (\"possibly LFTs and ammonia\") while on antibiotics since \"it goes through her liver.\" Writer verbalized appreciation of update and indicated to patient would pass along this information to Dr. Clayton. Dr. Clayton notified of this update. Patient and/or patient's mother to update should any additional concerns arise. No further needs at this time.  "

## 2018-01-15 ENCOUNTER — TELEPHONE (OUTPATIENT)
Dept: CONSULT | Facility: CLINIC | Age: 38
End: 2018-01-15

## 2018-01-15 NOTE — TELEPHONE ENCOUNTER
Brief Social Work Note - Genetics    Received request from [facility] in the pharmacy to contact patient re: $3.70 Shanai co-pay.  Per So, patient had $0 copay until this year.    Patient has coverage under Medicare and Medicaid.  Call placed to patient.  She stated she would be able to afford the $3.70 copay but would prefer not to have to pay it.  Writer encouraged her to call her financial worker to discuss changes to her coverage this year, if any.  Patient assistance programs not applicable given her Medicare/Medicaid coverage.    Encouraged her to call writer if assistance is needed with contacting her financial worker.  She did not have other questions or concerns.    BLAISE Trevino, LICSW, ACM  Clinical   [facility]  Advanced Therapies Program and Clinical Trials Services  Phone: [phone]

## 2018-01-16 ENCOUNTER — TELEPHONE (OUTPATIENT)
Dept: ENDOCRINOLOGY | Facility: CLINIC | Age: 38
End: 2018-01-16

## 2018-01-16 NOTE — TELEPHONE ENCOUNTER
1/16/2018 @ 11:45am-       Fax received from [employer] indicating patient's Ravicti is not included on formulary and need for formulary exception. Placed call to initiate verbal formulary exception/PA. Non-formulary approval for patient's Ravicti was granted and authorized from 1/01/2018-1/16/2019, case number [id number]. No further needs at this time.

## 2018-02-22 ENCOUNTER — ALLIED HEALTH/NURSE VISIT (OUTPATIENT)
Dept: ENDOCRINOLOGY | Facility: CLINIC | Age: 38
End: 2018-02-22
Payer: MEDICARE

## 2018-02-22 ENCOUNTER — OFFICE VISIT (OUTPATIENT)
Dept: ENDOCRINOLOGY | Facility: CLINIC | Age: 38
End: 2018-02-22
Payer: MEDICARE

## 2018-02-22 VITALS
HEIGHT: 65 IN | WEIGHT: 225.1 LBS | SYSTOLIC BLOOD PRESSURE: 106 MMHG | DIASTOLIC BLOOD PRESSURE: 73 MMHG | BODY MASS INDEX: 37.5 KG/M2 | HEART RATE: 68 BPM

## 2018-02-22 DIAGNOSIS — E72.4 OTC (ORNITHINE TRANSCARBAMYLASE DEFICIENCY) (H): ICD-10-CM

## 2018-02-22 DIAGNOSIS — E72.4 OTC (ORNITHINE TRANSCARBAMYLASE DEFICIENCY) (H): Primary | ICD-10-CM

## 2018-02-22 DIAGNOSIS — E71.42 CARNITINE DEFICIENCY DUE TO INBORN ERRORS OF METABOLISM (H): ICD-10-CM

## 2018-02-22 LAB
ALBUMIN SERPL-MCNC: 3.8 G/DL (ref 3.4–5)
ALP SERPL-CCNC: 64 U/L (ref 40–150)
ALT SERPL W P-5'-P-CCNC: 27 U/L (ref 0–50)
ANION GAP SERPL CALCULATED.3IONS-SCNC: 7 MMOL/L (ref 3–14)
AST SERPL W P-5'-P-CCNC: 14 U/L (ref 0–45)
BASOPHILS # BLD AUTO: 0 10E9/L (ref 0–0.2)
BASOPHILS NFR BLD AUTO: 0.5 %
BILIRUB SERPL-MCNC: 0.4 MG/DL (ref 0.2–1.3)
BUN SERPL-MCNC: 12 MG/DL (ref 7–30)
CALCIUM SERPL-MCNC: 8.6 MG/DL (ref 8.5–10.1)
CHLORIDE SERPL-SCNC: 108 MMOL/L (ref 94–109)
CO2 SERPL-SCNC: 23 MMOL/L (ref 20–32)
CREAT SERPL-MCNC: 0.67 MG/DL (ref 0.52–1.04)
DIFFERENTIAL METHOD BLD: NORMAL
EOSINOPHIL # BLD AUTO: 0.1 10E9/L (ref 0–0.7)
EOSINOPHIL NFR BLD AUTO: 1.5 %
ERYTHROCYTE [DISTWIDTH] IN BLOOD BY AUTOMATED COUNT: 14.2 % (ref 10–15)
GFR SERPL CREATININE-BSD FRML MDRD: >90 ML/MIN/1.7M2
GLUCOSE SERPL-MCNC: 94 MG/DL (ref 70–99)
HCT VFR BLD AUTO: 41.2 % (ref 35–47)
HGB BLD-MCNC: 13.5 G/DL (ref 11.7–15.7)
IMM GRANULOCYTES # BLD: 0 10E9/L (ref 0–0.4)
IMM GRANULOCYTES NFR BLD: 0.2 %
LYMPHOCYTES # BLD AUTO: 2.2 10E9/L (ref 0.8–5.3)
LYMPHOCYTES NFR BLD AUTO: 35.4 %
MCH RBC QN AUTO: 28 PG (ref 26.5–33)
MCHC RBC AUTO-ENTMCNC: 32.8 G/DL (ref 31.5–36.5)
MCV RBC AUTO: 86 FL (ref 78–100)
MONOCYTES # BLD AUTO: 0.3 10E9/L (ref 0–1.3)
MONOCYTES NFR BLD AUTO: 4.9 %
NEUTROPHILS # BLD AUTO: 3.5 10E9/L (ref 1.6–8.3)
NEUTROPHILS NFR BLD AUTO: 57.5 %
NRBC # BLD AUTO: 0 10*3/UL
NRBC BLD AUTO-RTO: 0 /100
PLATELET # BLD AUTO: 211 10E9/L (ref 150–450)
POTASSIUM SERPL-SCNC: 3.8 MMOL/L (ref 3.4–5.3)
PREALB SERPL IA-MCNC: 19 MG/DL (ref 15–45)
PROT SERPL-MCNC: 7.8 G/DL (ref 6.8–8.8)
RBC # BLD AUTO: 4.82 10E12/L (ref 3.8–5.2)
SODIUM SERPL-SCNC: 138 MMOL/L (ref 133–144)
WBC # BLD AUTO: 6.1 10E9/L (ref 4–11)

## 2018-02-22 PROCEDURE — 82139 AMINO ACIDS QUAN 6 OR MORE: CPT | Performed by: MEDICAL GENETICS

## 2018-02-22 NOTE — NURSING NOTE
"Chief Complaint   Patient presents with     RECHECK     OTC DEFICIENCY F/U       Initial /73 (BP Location: Right arm)  Pulse 68  Ht 1.651 m (5' 5\")  Wt 102.1 kg (225 lb 1.6 oz)  BMI 37.46 kg/m2 Estimated body mass index is 37.46 kg/(m^2) as calculated from the following:    Height as of this encounter: 1.651 m (5' 5\").    Weight as of this encounter: 102.1 kg (225 lb 1.6 oz).  Medication Reconciliation: complete         "

## 2018-02-22 NOTE — PATIENT INSTRUCTIONS
Metabolism Clinic    Maintain metabolic diet and medications.  Call if any general care questions arise - contact our nurse coordinator Sharyn Mattson at [phone].      Contact the metabolic doctor on-call if any immediate need because of illness - [phone]

## 2018-02-22 NOTE — LETTER
2018      RE: Shira William  [address] [facility]  [place] [address]       Adult Metabolism Clinic Return Visit  Name:  Shira William  :   1980  MRN:   [id number]  Date of Visit: 2018  PCP: Alex Lewis    Managing Metabolic Center(s):  [facility].    Chief Complaint:  Ms. Shira William is a 37 year old female whom I saw in follow up in Metabolism Clinic for OTC deficiency.  Ms. William was accompanied to this visit by her  , Eran. She also saw our dietitian at this visit.     Assessment:    Overall, Shira has been stable with regard to her OTC deficiency with no evidence of recent decompensation.  She works hard on compliance with her regimen.  She is having continuing challenges achieving good coverage for her medical support.  This is particularly problematic with regard to her amino acid supplements.  Her living situation also remains challenging.     Plan:    1. Testing ordered at this visit:   Orders Placed This Encounter   Procedures     DIET CONSULT COMPREHENSIVE     Amino acids plasma quantitative     Prealbumin     Carnitine free and total     Comprehensive metabolic panel     CBC with platelets differential   .    Results are as noted, below. Additional recommendations based on these laboratory results: Based on plasma protein profile and amino acids, protein sufficiency looks adequate.  Carnitine levels were improved.  Liver enzyme tests, kidney function tests and blood counts were normal.  Glutamine was in the normal range suggesting control of ammonia on a general basis.  Citrulline was in the elevated range suggesting recent administration  2. Medications: Continue current regimen of directed and citrulline supplementation  3. Metabolic dietician follow up with Jenni Joyner RD, LD at this visit to review special dietary concerns. Metabolic diet should be continued as prescribed.  See nutrition history,  below. They discussed:  ---  Nutrition Education:   Discussed and reviewed intake and food log, discussed with patient.  Reviewed that according to food log, she is slightly shy of her protein goal.  Discussed having 1 good source/high value protein at every meal (~8-10 grams worth) and options for these, as well as a snack containing protein such as string cheese, apple with peanut butter, or a yogurt.  Patient seems disappointed/down with herself as to no progress in weight loss; however enforced that no weight gain is also an accomplishment and especially throughout the winter months.  Encouraged her to focus on the future of eating 3 meals + 1-2 snacks/day (smaller, more frequent eating) with making healthy choices including protein for satiety.  Reviewed continuing to avoid sugar beverages and snacks that are empty calories.  Also encouraged working out as method of weight loss as well as positive for mood.  Patient had no other questions today.      Goals  1. Gradual weight loss/lower BMI  2. Meet >85% estimated nutrition needs through low protein diet  3. Ammonia/GLUT levels WNL  ---  4.   Continue to observe emergency precautions as previously discussed.  Our on-call metabolic service is available 24 hours/day by calling the page  ([phone]) and asking for the Genetics and Metabolism doctor on call.  5. Return to the Adult Metabolism Clinic in 5 months for follow-up.     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  She works hard each day to make sure she takes her medicines on a regular basis.  She is experiencing difficulty in coverage for her medications.  For many years, her father has encapsulated powered product but this will not be a long-term solution for medication administration.      Interval History:  Shira was last seen in our clinic on  8/24/17.  Since the last clinic visit Shira was not seen for any urgent clinic visits.  She was not seen in the emergency department.  She currently knows how to contact the 24 hour metabolic specialist on call and has a written emergency letter for this condition.  No hospitalizations occurred.  She had no general anesthesia and no surgical procedures.      Other health services currently received are primary care, dietitian  Current research study participation: Longitudinal Study of Urea Cycle Disorders.             Personal History:  Family History Update:   There was no new family history information elicited at this visit.    Lives with Spouse/partner  Stressors for patient and family: Shira and then continue to live in the basement of his grandmother's house.  This is stable, but remains problematic because of poor access to household facilities.  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  Eran has been able to go go back to work at his previous place in the employment.  He was accepted despite his recent legal difficulties and is working part-time.  They were enjoying a new cat.    Current insurance status state/federal program (Medicaid/Medicare).   Neuropsychological evaluation was not performed since the last clinic visit.     I have reviewed Shira s past medical history, family history, social history, medications and allergies as documented in the patient's electronic medical record.  There were no additional findings except  as noted.     Review of Systems:   Constitional: Overweight  Eyes: wears glasses  Ears/Nose/Throat: negative - normal hearing  Respiratory: Occasionally needs albuterol  Cardiovascular: On atenolol  Gastrointestinal: Irritable bowel symptoms  Genitourinary: negative  Musculoskeletal: negative  Endocrine: negative  Hematologic/Lymphatic/Immunologic: negative  Integument: Vitiligo  Neurologic: intellectual disability  Psychiatric: Anxiety and some depression, currently on medications    Nutrition History:  Patient is on a protein-restricted diet of 40-45 g/day.      Patient brings in a food log today.  Typical intake:      Breakfast: poached egg + toast + banana, rice krispies + milk, woodall/egg/cheese sandwich + hash browns  Lunch: soup, beef stew + rice, BLT + grapes  Dinner: 2 tacos, hamburger + tater tos + (not written down)  Beverages: tea, coffee, water  Snacks: none logged      Food log average 1200 kcals/day and 36 grams/day.     Medications:  Current Outpatient Prescriptions   Medication Sig Dispense Refill     glycerol Phenylbutyrate (RAVICTI) 1.1 GM/ML solution Take 3.4 mLs (3.74 g) by mouth 3 times daily With food 306 mL 11     levOCARNitine (CARNITOR) 1 GM/10ML solution Take 5 mLs (500 mg) by mouth 3 times daily 465 mL 11     l-citrulline POWD Take 1.8 g by mouth 4 times daily 250 g 12     omeprazole (PRILOSEC) 20 MG capsule Take by mouth daily       sertraline (ZOLOFT) 100 MG tablet Take by mouth daily       HYDROcodone-acetaminophen (VICODIN) 5-500 MG per tablet Take 1-2 tablets by mouth every 6 hours as needed for moderate to severe pain       ondansetron (ZOFRAN-ODT) 4 MG disintegrating tablet Take by mouth every 12 hours as needed for nausea       pimecrolimus (ELIDEL) 1 % cream Apply topically 2 times daily       atenolol (TENORMIN) 50 MG tablet Take 50 mg by mouth daily       ibuprofen (ADVIL,MOTRIN) 400 MG tablet Take 400 mg by mouth every 6 hours as needed for moderate pain       Multiple  "Vitamins-Iron (MULTIVITAMIN  /IRON) TABS Take 1 tablet daily.       calcium-vitamin D (CALCIUM 600 + D) 600-400 MG-UNIT per tablet Take 1 tablet by mouth 2 times daily (with meals). 60 tablet 0     Allergies:  Allergies   Allergen Reactions     Compazine [Prochlorperazine]      Erythromycin      Msg [Magnesium Salicylate]      Physical Examination:  Blood pressure 106/73, pulse 68, height 1.651 m (5' 5\"), weight 102.1 kg (225 lb 1.6 oz).  Body surface area is 2.16 meters squared.    General: This was a well-developed, well-nourished female who responded appropriately to all requests during the examination.    Head and Neck:  She had hair of normal texture and distribution and her head was proportionate in appearance.The neck was supple without lymphadenopathy.   Eyes:  The pupils were equal, round, and reacted to light.   The conjunctivae were clear.   Ears:  Her ears were normal in architecture and placement.   Nose: The nose was clear.    Mouth and Throat: her dentition was normal.  The throat was without erythema.    Respiratory: The chest was clear to auscultation and had a symmetric appearance.    CV:  On examination of the heart, the rhythm was regular and there was no murmur.  The peripheral pulses were normal.    GI: The abdomen was soft and had normal bowel sounds.  There was no hepatosplenomegaly.    : I deferred a  examination.   Musculoskeletal: There was a full range of motion on the extremity exam, and normal muscular volume and bulk.   Neurologic: The neurologic exam was normal, with normal cranial nerves, normal deep tendon reflexes, normal sensation, and a normal gait. She had normal tone.   Integument: The skin was normal with no rashes or unusual pigmentation.    Results of laboratory studies collected at this visit and available when this note was completed:   Results for orders placed or performed in visit on 02/22/18   Amino acids plasma quantitative   Result Value Ref Range    " A-Aminoadipic Negative 0 - 6 umol/dL    Alanine 26 22 - 62 umol/dL    Anserine Negative umol/dL    Arginine 8 2 - 18 umol/dL    Asparagine 3 1 - 5 umol/dL    Aspartic Acid <1 0 - 4 umol/dL    B-Alanine Plasma Negative umol/dL    B-Aminoisobutyric Negative umol/dL    Carnosine Negative umol/dL    Citrulline 22.7 (H) 1.3 - 6.0 umol/dL    Cystathionine Negative umol/dL    Cystine 10 3 - 15 umol/dL    Glutamic Acid 7 0 - 16 umol/dL    Glutamine 66 41 - 86 umol/dL    Glycine 14 13 - 50 umol/dL    Histidine 8 3 - 15 umol/dL    1-Methylhistidine 3 (H) 0 - 2 umol/dL    3-Methylhistidine <1 0 - 1 umol/dL    Homocysteine umol/dL Negative umol/dL    Hydroxylysine Negative umol/dL    Hydroxyproline Negative 0 - 3 umol/dL    Isoleucine 6 4 - 11 umol/dL    Leucine 10 8 - 21 umol/dL    Lysine 18 6 - 26 umol/dL    Methionine 3 1 - 6 umol/dL    Ornithine 7 2 - 16 umol/dL    Phenylalanine umol/dL 5 3 - 10 umol/dL    Proline 15 0 - 48 umol/dL    Sarcosine Plasma Negative umol/dL    Serine 6 4 - 18 umol/dL    Taurine 4 (L) 7 - 32 umol/dL    Threonine 7 5 - 25 umol/dL    Tyrosine 7 4 - 13 umol/dL    Valine 21 8 - 46 umol/dL   Prealbumin   Result Value Ref Range    Prealbumin 19 15 - 45 mg/dL   Carnitine free and total   Result Value Ref Range    Carnitine Free 29 25 - 60 umol/L    CarnitineTotal 46 34 - 86 umol/L    Carnitine Esterified 17 5 - 29 umol/L    Carnitine Esterified/Free Ratio 0.6 0.1 - 1.0   Comprehensive metabolic panel   Result Value Ref Range    Sodium 138 133 - 144 mmol/L    Potassium 3.8 3.4 - 5.3 mmol/L    Chloride 108 94 - 109 mmol/L    Carbon Dioxide 23 20 - 32 mmol/L    Anion Gap 7 3 - 14 mmol/L    Glucose 94 70 - 99 mg/dL    Urea Nitrogen 12 7 - 30 mg/dL    Creatinine 0.67 0.52 - 1.04 mg/dL    GFR Estimate >90 >60 mL/min/1.7m2    GFR Estimate If Black >90 >60 mL/min/1.7m2    Calcium 8.6 8.5 - 10.1 mg/dL    Bilirubin Total 0.4 0.2 - 1.3 mg/dL    Albumin 3.8 3.4 - 5.0 g/dL    Protein Total 7.8 6.8 - 8.8 g/dL     Alkaline Phosphatase 64 40 - 150 U/L    ALT 27 0 - 50 U/L    AST 14 0 - 45 U/L   CBC with platelets differential   Result Value Ref Range    WBC 6.1 4.0 - 11.0 10e9/L    RBC Count 4.82 3.8 - 5.2 10e12/L    Hemoglobin 13.5 11.7 - 15.7 g/dL    Hematocrit 41.2 35.0 - 47.0 %    MCV 86 78 - 100 fl    MCH 28.0 26.5 - 33.0 pg    MCHC 32.8 31.5 - 36.5 g/dL    RDW 14.2 10.0 - 15.0 %    Platelet Count 211 150 - 450 10e9/L    Diff Method Automated Method     % Neutrophils 57.5 %    % Lymphocytes 35.4 %    % Monocytes 4.9 %    % Eosinophils 1.5 %    % Basophils 0.5 %    % Immature Granulocytes 0.2 %    Nucleated RBCs 0 0 /100    Absolute Neutrophil 3.5 1.6 - 8.3 10e9/L    Absolute Lymphocytes 2.2 0.8 - 5.3 10e9/L    Absolute Monocytes 0.3 0.0 - 1.3 10e9/L    Absolute Eosinophils 0.1 0.0 - 0.7 10e9/L    Absolute Basophils 0.0 0.0 - 0.2 10e9/L    Abs Immature Granulocytes 0.0 0 - 0.4 10e9/L    Absolute Nucleated RBC 0.0        CHUN RODRIGUEZ M.D.  Professor and Director   Division of Genetics and Metabolism  Department of Pediatrics  [facility]    Routed to patient in Comm [facility]gt  Also to Alex Lewis

## 2018-02-22 NOTE — LETTER
2018       RE: Shira William  [address] [facility]  [place] [address]     Dear Colleague,    Thank you for referring your patient, Shira William, to the [facility] METABOLIC DISORDERS at [facility]. Please see a copy of my visit note below.    Adult Metabolism Clinic Return Visit  Name:  Shira William  :   1980  MRN:   [id number]  Date of Visit: 2018  PCP: Alex Lewis    Managing Metabolic Center(s):  [facility].    Chief Complaint:  Ms. Shira William is a 37 year old female whom I saw in follow up in Metabolism Clinic for OTC deficiency.  Ms. William was accompanied to this visit by her  , Eran. She also saw our dietitian at this visit.     Assessment:    Overall, Shira has been stable with regard to her OTC deficiency with no evidence of recent decompensation.  She works hard on compliance with her regimen.  She is having continuing challenges achieving good coverage for her medical support.  This is particularly problematic with regard to her amino acid supplements.  Her living situation also remains challenging.     Plan:    1. Testing ordered at this visit:   Orders Placed This Encounter   Procedures     DIET CONSULT COMPREHENSIVE     Amino acids plasma quantitative     Prealbumin     Carnitine free and total     Comprehensive metabolic panel     CBC with platelets differential   .    Results are as noted, below. Additional recommendations based on these laboratory results: Based on plasma protein profile and amino acids, protein sufficiency looks adequate.  Carnitine levels were improved.  Liver enzyme tests, kidney function tests and blood counts were normal.  Glutamine was in the normal range suggesting control of ammonia on a general basis.  Citrulline was in the elevated range suggesting recent administration  2. Medications: Continue current regimen of directed and  citrulline supplementation  3. Metabolic dietician follow up with Jenni Joyner RD, LD at this visit to review special dietary concerns. Metabolic diet should be continued as prescribed.  See nutrition history, below. They discussed:  ---  Nutrition Education:   Discussed and reviewed intake and food log, discussed with patient.  Reviewed that according to food log, she is slightly shy of her protein goal.  Discussed having 1 good source/high value protein at every meal (~8-10 grams worth) and options for these, as well as a snack containing protein such as string cheese, apple with peanut butter, or a yogurt.  Patient seems disappointed/down with herself as to no progress in weight loss; however enforced that no weight gain is also an accomplishment and especially throughout the winter months.  Encouraged her to focus on the future of eating 3 meals + 1-2 snacks/day (smaller, more frequent eating) with making healthy choices including protein for satiety.  Reviewed continuing to avoid sugar beverages and snacks that are empty calories.  Also encouraged working out as method of weight loss as well as positive for mood.  Patient had no other questions today.      Goals  1. Gradual weight loss/lower BMI  2. Meet >85% estimated nutrition needs through low protein diet  3. Ammonia/GLUT levels WNL  ---  4.   Continue to observe emergency precautions as previously discussed.  Our on-call metabolic service is available 24 hours/day by calling the page  ([phone]) and asking for the Genetics and Metabolism doctor on call.  5. Return to the Adult Metabolism Clinic in 5 months for follow-up.     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  She works hard each day to make sure she takes her medicines on a regular basis.  She is experiencing difficulty in coverage for her medications.  For many years, her father has encapsulated powered product but this will not be a long-term solution for medication administration.      Interval History:  Shira was last seen in our clinic on  8/24/17.  Since the last clinic visit Shira was not seen for any urgent clinic visits.  She was not seen in the emergency department.  She currently knows how to contact the 24 hour metabolic specialist on call and has a written emergency letter for this condition.  No hospitalizations occurred.  She had no general anesthesia and no surgical procedures.      Other health services currently received are primary care, dietitian  Current research study participation: Longitudinal Study of Urea Cycle Disorders.             Personal History:  Family History Update:   There was no new family history information elicited at this visit.    Lives with Spouse/partner  Stressors for patient and family: Shira and then continue to live in the basement of his grandmother's house.  This is stable, but remains problematic because of poor access to household facilities.  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  Eran has been able to go go back to work at his previous place in the employment.  He was accepted despite his recent legal difficulties and is working part-time.  They were enjoying a new cat.    Current insurance status state/federal program (Medicaid/Medicare).   Neuropsychological evaluation was not performed since the last clinic visit.      I have reviewed Shira s past medical history, family history, social history, medications and allergies as documented in the patient's electronic medical record.  There were no additional findings except as noted.     Review of Systems:   Constitional: Overweight  Eyes: wears glasses  Ears/Nose/Throat: negative - normal hearing  Respiratory: Occasionally needs albuterol  Cardiovascular: On atenolol  Gastrointestinal: Irritable bowel symptoms  Genitourinary: negative  Musculoskeletal: negative  Endocrine: negative  Hematologic/Lymphatic/Immunologic: negative  Integument: Vitiligo  Neurologic: intellectual disability  Psychiatric: Anxiety and some depression, currently on medications    Nutrition History:  Patient is on a protein-restricted diet of 40-45 g/day.      Patient brings in a food log today.  Typical intake:      Breakfast: poached egg + toast + banana, rice krispies + milk, woodall/egg/cheese sandwich + hash browns  Lunch: soup, beef stew + rice, BLT + grapes  Dinner: 2 tacos, hamburger + tater tos + (not written down)  Beverages: tea, coffee, water  Snacks: none logged      Food log average 1200 kcals/day and 36 grams/day.     Medications:  Current Outpatient Prescriptions   Medication Sig Dispense Refill     glycerol Phenylbutyrate (RAVICTI) 1.1 GM/ML solution Take 3.4 mLs (3.74 g) by mouth 3 times daily With food 306 mL 11     levOCARNitine (CARNITOR) 1 GM/10ML solution Take 5 mLs (500 mg) by mouth 3 times daily 465 mL 11     l-citrulline POWD Take 1.8 g by mouth 4 times daily 250 g 12     omeprazole (PRILOSEC) 20 MG capsule Take by mouth daily       sertraline (ZOLOFT) 100 MG tablet Take by mouth daily       HYDROcodone-acetaminophen (VICODIN) 5-500 MG per tablet Take 1-2 tablets by mouth every 6 hours as needed for moderate to severe pain       ondansetron (ZOFRAN-ODT) 4 MG disintegrating tablet Take by mouth every 12 hours as needed for nausea       pimecrolimus (ELIDEL) 1 % cream Apply topically  "2 times daily       atenolol (TENORMIN) 50 MG tablet Take 50 mg by mouth daily       ibuprofen (ADVIL,MOTRIN) 400 MG tablet Take 400 mg by mouth every 6 hours as needed for moderate pain       Multiple Vitamins-Iron (MULTIVITAMIN  /IRON) TABS Take 1 tablet daily.       calcium-vitamin D (CALCIUM 600 + D) 600-400 MG-UNIT per tablet Take 1 tablet by mouth 2 times daily (with meals). 60 tablet 0     Allergies:  Allergies   Allergen Reactions     Compazine [Prochlorperazine]      Erythromycin      Msg [Magnesium Salicylate]      Physical Examination:  Blood pressure 106/73, pulse 68, height 1.651 m (5' 5\"), weight 102.1 kg (225 lb 1.6 oz).  Body surface area is 2.16 meters squared.    General: This was a well-developed, well-nourished female who responded appropriately to all requests during the examination.    Head and Neck:  She had hair of normal texture and distribution and her head was proportionate in appearance.The neck was supple without lymphadenopathy.   Eyes:  The pupils were equal, round, and reacted to light.   The conjunctivae were clear.   Ears:  Her ears were normal in architecture and placement.   Nose: The nose was clear.    Mouth and Throat: her dentition was normal.  The throat was without erythema.    Respiratory: The chest was clear to auscultation and had a symmetric appearance.    CV:  On examination of the heart, the rhythm was regular and there was no murmur.  The peripheral pulses were normal.    GI: The abdomen was soft and had normal bowel sounds.  There was no hepatosplenomegaly.    : I deferred a  examination.   Musculoskeletal: There was a full range of motion on the extremity exam, and normal muscular volume and bulk.   Neurologic: The neurologic exam was normal, with normal cranial nerves, normal deep tendon reflexes, normal sensation, and a normal gait. She had normal tone.   Integument: The skin was normal with no rashes or unusual pigmentation.    Results of laboratory studies " collected at this visit and available when this note was completed:   Results for orders placed or performed in visit on 02/22/18   Amino acids plasma quantitative   Result Value Ref Range    A-Aminoadipic Negative 0 - 6 umol/dL    Alanine 26 22 - 62 umol/dL    Anserine Negative umol/dL    Arginine 8 2 - 18 umol/dL    Asparagine 3 1 - 5 umol/dL    Aspartic Acid <1 0 - 4 umol/dL    B-Alanine Plasma Negative umol/dL    B-Aminoisobutyric Negative umol/dL    Carnosine Negative umol/dL    Citrulline 22.7 (H) 1.3 - 6.0 umol/dL    Cystathionine Negative umol/dL    Cystine 10 3 - 15 umol/dL    Glutamic Acid 7 0 - 16 umol/dL    Glutamine 66 41 - 86 umol/dL    Glycine 14 13 - 50 umol/dL    Histidine 8 3 - 15 umol/dL    1-Methylhistidine 3 (H) 0 - 2 umol/dL    3-Methylhistidine <1 0 - 1 umol/dL    Homocysteine umol/dL Negative umol/dL    Hydroxylysine Negative umol/dL    Hydroxyproline Negative 0 - 3 umol/dL    Isoleucine 6 4 - 11 umol/dL    Leucine 10 8 - 21 umol/dL    Lysine 18 6 - 26 umol/dL    Methionine 3 1 - 6 umol/dL    Ornithine 7 2 - 16 umol/dL    Phenylalanine umol/dL 5 3 - 10 umol/dL    Proline 15 0 - 48 umol/dL    Sarcosine Plasma Negative umol/dL    Serine 6 4 - 18 umol/dL    Taurine 4 (L) 7 - 32 umol/dL    Threonine 7 5 - 25 umol/dL    Tyrosine 7 4 - 13 umol/dL    Valine 21 8 - 46 umol/dL   Prealbumin   Result Value Ref Range    Prealbumin 19 15 - 45 mg/dL   Carnitine free and total   Result Value Ref Range    Carnitine Free 29 25 - 60 umol/L    CarnitineTotal 46 34 - 86 umol/L    Carnitine Esterified 17 5 - 29 umol/L    Carnitine Esterified/Free Ratio 0.6 0.1 - 1.0   Comprehensive metabolic panel   Result Value Ref Range    Sodium 138 133 - 144 mmol/L    Potassium 3.8 3.4 - 5.3 mmol/L    Chloride 108 94 - 109 mmol/L    Carbon Dioxide 23 20 - 32 mmol/L    Anion Gap 7 3 - 14 mmol/L    Glucose 94 70 - 99 mg/dL    Urea Nitrogen 12 7 - 30 mg/dL    Creatinine 0.67 0.52 - 1.04 mg/dL    GFR Estimate >90 >60 mL/min/1.7m2     GFR Estimate If Black >90 >60 mL/min/1.7m2    Calcium 8.6 8.5 - 10.1 mg/dL    Bilirubin Total 0.4 0.2 - 1.3 mg/dL    Albumin 3.8 3.4 - 5.0 g/dL    Protein Total 7.8 6.8 - 8.8 g/dL    Alkaline Phosphatase 64 40 - 150 U/L    ALT 27 0 - 50 U/L    AST 14 0 - 45 U/L   CBC with platelets differential   Result Value Ref Range    WBC 6.1 4.0 - 11.0 10e9/L    RBC Count 4.82 3.8 - 5.2 10e12/L    Hemoglobin 13.5 11.7 - 15.7 g/dL    Hematocrit 41.2 35.0 - 47.0 %    MCV 86 78 - 100 fl    MCH 28.0 26.5 - 33.0 pg    MCHC 32.8 31.5 - 36.5 g/dL    RDW 14.2 10.0 - 15.0 %    Platelet Count 211 150 - 450 10e9/L    Diff Method Automated Method     % Neutrophils 57.5 %    % Lymphocytes 35.4 %    % Monocytes 4.9 %    % Eosinophils 1.5 %    % Basophils 0.5 %    % Immature Granulocytes 0.2 %    Nucleated RBCs 0 0 /100    Absolute Neutrophil 3.5 1.6 - 8.3 10e9/L    Absolute Lymphocytes 2.2 0.8 - 5.3 10e9/L    Absolute Monocytes 0.3 0.0 - 1.3 10e9/L    Absolute Eosinophils 0.1 0.0 - 0.7 10e9/L    Absolute Basophils 0.0 0.0 - 0.2 10e9/L    Abs Immature Granulocytes 0.0 0 - 0.4 10e9/L    Absolute Nucleated RBC 0.0        CHUN RODRIGUEZ M.D.  Professor and Director   Division of Genetics and Metabolism  Department of Pediatrics  [facility]    Routed to patient in Comm [facility]  Also to Alex Lewis

## 2018-02-22 NOTE — PROGRESS NOTES
Adult Metabolism Clinic Return Visit  Name:  Shira William  :   1980  MRN:   [id number]  Date of Visit: 2018  PCP: Alex Lewis    Managing Metabolic Center(s):  [facility].    Chief Complaint:  Ms. Shira William is a 37 year old female whom I saw in follow up in Metabolism Clinic for OTC deficiency.  Ms. William was accompanied to this visit by her  , Eran. She also saw our dietitian at this visit.     Assessment:    Overall, Shira has been stable with regard to her OTC deficiency with no evidence of recent decompensation.  She works hard on compliance with her regimen.  She is having continuing challenges achieving good coverage for her medical support.  This is particularly problematic with regard to her amino acid supplements.  Her living situation also remains challenging.     Plan:    1. Testing ordered at this visit:   Orders Placed This Encounter   Procedures     DIET CONSULT COMPREHENSIVE     Amino acids plasma quantitative     Prealbumin     Carnitine free and total     Comprehensive metabolic panel     CBC with platelets differential   .    Results are as noted, below. Additional recommendations based on these laboratory results: Based on plasma protein profile and amino acids, protein sufficiency looks adequate.  Carnitine levels were improved.  Liver enzyme tests, kidney function tests and blood counts were normal.  Glutamine was in the normal range suggesting control of ammonia on a general basis.  Citrulline was in the elevated range suggesting recent administration  2. Medications: Continue current regimen of directed and citrulline supplementation  3. Metabolic dietician follow up with Jenni Joyner RD, LD at this visit to review special dietary concerns. Metabolic diet should be continued as prescribed.  See nutrition history, below. They discussed:  ---  Nutrition Education:   Discussed and reviewed intake  and food log, discussed with patient.  Reviewed that according to food log, she is slightly shy of her protein goal.  Discussed having 1 good source/high value protein at every meal (~8-10 grams worth) and options for these, as well as a snack containing protein such as string cheese, apple with peanut butter, or a yogurt.  Patient seems disappointed/down with herself as to no progress in weight loss; however enforced that no weight gain is also an accomplishment and especially throughout the winter months.  Encouraged her to focus on the future of eating 3 meals + 1-2 snacks/day (smaller, more frequent eating) with making healthy choices including protein for satiety.  Reviewed continuing to avoid sugar beverages and snacks that are empty calories.  Also encouraged working out as method of weight loss as well as positive for mood.  Patient had no other questions today.      Goals  1. Gradual weight loss/lower BMI  2. Meet >85% estimated nutrition needs through low protein diet  3. Ammonia/GLUT levels WNL  ---  4.   Continue to observe emergency precautions as previously discussed.  Our on-call metabolic service is available 24 hours/day by calling the page  ([phone]) and asking for the Genetics and Metabolism doctor on call.  5. Return to the Adult Metabolism Clinic in 5 months for follow-up.     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  She works hard each day to make sure she takes her medicines on a regular basis.  She is experiencing difficulty in coverage for her medications.  For many years, her father has encapsulated powered product but this will not be a long-term solution for medication administration.      Interval History:  Shira was last seen in our clinic on  8/24/17.  Since the last clinic visit Shira was not seen for any urgent clinic visits.  She was not seen in the emergency department.  She currently knows how to contact the 24 hour metabolic specialist on call and has a written emergency letter for this condition.  No hospitalizations occurred.  She had no general anesthesia and no surgical procedures.      Other health services currently received are primary care, dietitian  Current research study participation: Longitudinal Study of Urea Cycle Disorders.             Personal History:  Family History Update:   There was no new family history information elicited at this visit.    Lives with Spouse/partner  Stressors for patient and family: Shira and then continue to live in the basement of his grandmother's house.  This is stable, but remains problematic because of poor access to household facilities.  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  Eran has been able to go go back to work at his previous place in the employment.  He was accepted despite his recent legal difficulties and is working part-time.  They were enjoying a new cat.    Current insurance status state/federal program (Medicaid/Medicare).   Neuropsychological evaluation was not performed since the last clinic visit.     I have reviewed Shira s past medical history, family history, social history, medications and allergies as documented in the patient's electronic medical record.  There were no additional findings except as noted.     Review of Systems:   Constitional: Overweight  Eyes: wears  glasses  Ears/Nose/Throat: negative - normal hearing  Respiratory: Occasionally needs albuterol  Cardiovascular: On atenolol  Gastrointestinal: Irritable bowel symptoms  Genitourinary: negative  Musculoskeletal: negative  Endocrine: negative  Hematologic/Lymphatic/Immunologic: negative  Integument: Vitiligo  Neurologic: intellectual disability  Psychiatric: Anxiety and some depression, currently on medications    Nutrition History:  Patient is on a protein-restricted diet of 40-45 g/day.      Patient brings in a food log today.  Typical intake:      Breakfast: poached egg + toast + banana, rice krispies + milk, woodall/egg/cheese sandwich + hash browns  Lunch: soup, beef stew + rice, BLT + grapes  Dinner: 2 tacos, hamburger + tater tos + (not written down)  Beverages: tea, coffee, water  Snacks: none logged      Food log average 1200 kcals/day and 36 grams/day.     Medications:  Current Outpatient Prescriptions   Medication Sig Dispense Refill     glycerol Phenylbutyrate (RAVICTI) 1.1 GM/ML solution Take 3.4 mLs (3.74 g) by mouth 3 times daily With food 306 mL 11     levOCARNitine (CARNITOR) 1 GM/10ML solution Take 5 mLs (500 mg) by mouth 3 times daily 465 mL 11     l-citrulline POWD Take 1.8 g by mouth 4 times daily 250 g 12     omeprazole (PRILOSEC) 20 MG capsule Take by mouth daily       sertraline (ZOLOFT) 100 MG tablet Take by mouth daily       HYDROcodone-acetaminophen (VICODIN) 5-500 MG per tablet Take 1-2 tablets by mouth every 6 hours as needed for moderate to severe pain       ondansetron (ZOFRAN-ODT) 4 MG disintegrating tablet Take by mouth every 12 hours as needed for nausea       pimecrolimus (ELIDEL) 1 % cream Apply topically 2 times daily       atenolol (TENORMIN) 50 MG tablet Take 50 mg by mouth daily       ibuprofen (ADVIL,MOTRIN) 400 MG tablet Take 400 mg by mouth every 6 hours as needed for moderate pain       Multiple Vitamins-Iron (MULTIVITAMIN  /IRON) TABS Take 1 tablet daily.        "calcium-vitamin D (CALCIUM 600 + D) 600-400 MG-UNIT per tablet Take 1 tablet by mouth 2 times daily (with meals). 60 tablet 0     Allergies:  Allergies   Allergen Reactions     Compazine [Prochlorperazine]      Erythromycin      Msg [Magnesium Salicylate]      Physical Examination:  Blood pressure 106/73, pulse 68, height 1.651 m (5' 5\"), weight 102.1 kg (225 lb 1.6 oz).  Body surface area is 2.16 meters squared.    General: This was a well-developed, well-nourished female who responded appropriately to all requests during the examination.    Head and Neck:  She had hair of normal texture and distribution and her head was proportionate in appearance.The neck was supple without lymphadenopathy.   Eyes:  The pupils were equal, round, and reacted to light.   The conjunctivae were clear.   Ears:  Her ears were normal in architecture and placement.   Nose: The nose was clear.    Mouth and Throat: her dentition was normal.  The throat was without erythema.    Respiratory: The chest was clear to auscultation and had a symmetric appearance.    CV:  On examination of the heart, the rhythm was regular and there was no murmur.  The peripheral pulses were normal.    GI: The abdomen was soft and had normal bowel sounds.  There was no hepatosplenomegaly.    : I deferred a  examination.   Musculoskeletal: There was a full range of motion on the extremity exam, and normal muscular volume and bulk.   Neurologic: The neurologic exam was normal, with normal cranial nerves, normal deep tendon reflexes, normal sensation, and a normal gait. She had normal tone.   Integument: The skin was normal with no rashes or unusual pigmentation.    Results of laboratory studies collected at this visit and available when this note was completed:   Results for orders placed or performed in visit on 02/22/18   Amino acids plasma quantitative   Result Value Ref Range    A-Aminoadipic Negative 0 - 6 umol/dL    Alanine 26 22 - 62 umol/dL    Anserine " Negative umol/dL    Arginine 8 2 - 18 umol/dL    Asparagine 3 1 - 5 umol/dL    Aspartic Acid <1 0 - 4 umol/dL    B-Alanine Plasma Negative umol/dL    B-Aminoisobutyric Negative umol/dL    Carnosine Negative umol/dL    Citrulline 22.7 (H) 1.3 - 6.0 umol/dL    Cystathionine Negative umol/dL    Cystine 10 3 - 15 umol/dL    Glutamic Acid 7 0 - 16 umol/dL    Glutamine 66 41 - 86 umol/dL    Glycine 14 13 - 50 umol/dL    Histidine 8 3 - 15 umol/dL    1-Methylhistidine 3 (H) 0 - 2 umol/dL    3-Methylhistidine <1 0 - 1 umol/dL    Homocysteine umol/dL Negative umol/dL    Hydroxylysine Negative umol/dL    Hydroxyproline Negative 0 - 3 umol/dL    Isoleucine 6 4 - 11 umol/dL    Leucine 10 8 - 21 umol/dL    Lysine 18 6 - 26 umol/dL    Methionine 3 1 - 6 umol/dL    Ornithine 7 2 - 16 umol/dL    Phenylalanine umol/dL 5 3 - 10 umol/dL    Proline 15 0 - 48 umol/dL    Sarcosine Plasma Negative umol/dL    Serine 6 4 - 18 umol/dL    Taurine 4 (L) 7 - 32 umol/dL    Threonine 7 5 - 25 umol/dL    Tyrosine 7 4 - 13 umol/dL    Valine 21 8 - 46 umol/dL   Prealbumin   Result Value Ref Range    Prealbumin 19 15 - 45 mg/dL   Carnitine free and total   Result Value Ref Range    Carnitine Free 29 25 - 60 umol/L    CarnitineTotal 46 34 - 86 umol/L    Carnitine Esterified 17 5 - 29 umol/L    Carnitine Esterified/Free Ratio 0.6 0.1 - 1.0   Comprehensive metabolic panel   Result Value Ref Range    Sodium 138 133 - 144 mmol/L    Potassium 3.8 3.4 - 5.3 mmol/L    Chloride 108 94 - 109 mmol/L    Carbon Dioxide 23 20 - 32 mmol/L    Anion Gap 7 3 - 14 mmol/L    Glucose 94 70 - 99 mg/dL    Urea Nitrogen 12 7 - 30 mg/dL    Creatinine 0.67 0.52 - 1.04 mg/dL    GFR Estimate >90 >60 mL/min/1.7m2    GFR Estimate If Black >90 >60 mL/min/1.7m2    Calcium 8.6 8.5 - 10.1 mg/dL    Bilirubin Total 0.4 0.2 - 1.3 mg/dL    Albumin 3.8 3.4 - 5.0 g/dL    Protein Total 7.8 6.8 - 8.8 g/dL    Alkaline Phosphatase 64 40 - 150 U/L    ALT 27 0 - 50 U/L    AST 14 0 - 45 U/L   CBC  with platelets differential   Result Value Ref Range    WBC 6.1 4.0 - 11.0 10e9/L    RBC Count 4.82 3.8 - 5.2 10e12/L    Hemoglobin 13.5 11.7 - 15.7 g/dL    Hematocrit 41.2 35.0 - 47.0 %    MCV 86 78 - 100 fl    MCH 28.0 26.5 - 33.0 pg    MCHC 32.8 31.5 - 36.5 g/dL    RDW 14.2 10.0 - 15.0 %    Platelet Count 211 150 - 450 10e9/L    Diff Method Automated Method     % Neutrophils 57.5 %    % Lymphocytes 35.4 %    % Monocytes 4.9 %    % Eosinophils 1.5 %    % Basophils 0.5 %    % Immature Granulocytes 0.2 %    Nucleated RBCs 0 0 /100    Absolute Neutrophil 3.5 1.6 - 8.3 10e9/L    Absolute Lymphocytes 2.2 0.8 - 5.3 10e9/L    Absolute Monocytes 0.3 0.0 - 1.3 10e9/L    Absolute Eosinophils 0.1 0.0 - 0.7 10e9/L    Absolute Basophils 0.0 0.0 - 0.2 10e9/L    Abs Immature Granulocytes 0.0 0 - 0.4 10e9/L    Absolute Nucleated RBC 0.0        CHUN RODRIGUEZ M.D.  Professor and Director   Division of Genetics and Metabolism  Department of Pediatrics  [facility]    Routed to patient in Comm Mgt  Also to Alex Lewis

## 2018-02-22 NOTE — MR AVS SNAPSHOT
MRN:[id number]                      After Visit Summary   2018    Shira William    MRN: [id number]           Visit Information        Provider Department      2018 2:45 PM Jenni Joyner RD  Health Metabolic Disorders        Your next 10 appointments already scheduled     Sep 27, 2018  3:15 PM CDT   (Arrive by 3:00 PM)   Return Visit with MD VIVIAN Lim Health Metabolic Disorders ([facility])    [address] [place] [id number] [phone]              MyChart Information     GraphSciencehart is an electronic gateway that provides easy, online access to your medical records. With Voovio aka 3Ditize, you can request a clinic appointment, read your test results, renew a prescription or communicate with your care team.     To sign up for [email or web address] visit the website at [email or web address]   You will be asked to enter the access code listed below, as well as some personal information. Please follow the directions to create your username and password.     Your access code is: [email or web address]  Expires: 2018  6:30 AM     Your access code will  in 90 days. If you need help or a new code, please contact your [facility] or call [phone] for assistance.        Care EveryWhere ID     This is your Care EveryWhere ID. This could be used by other organizations to access your [place] medical records  [id number]        Equal Access to Services     JAROD MANCINI : Hadii bravo mcginniso Sojavier, waaxda luqadaha, qaybta kaalmada adeegyada, vonnie mccrary. So [facility] [phone].    ATENCIÓN: Si habla español, tiene a sotelo disposición servicios gratuitos de asistencia lingüística. Llame al [phone].    We comply with applicable federal civil rights laws and [place] laws. We do not discriminate on the basis of race, color, national origin, age, disability, sex, sexual  orientation, or gender identity.

## 2018-02-22 NOTE — MR AVS SNAPSHOT
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  Call if any general care questions arise - contact our nurse coordinator Sharyn Mattson at [phone].      Contact the metabolic doctor on-call if any immediate need because of illness - [phone]            Follow-ups after your visit        Follow-up notes from your care team     Return in about 6 months (around 8/22/2018).      Your next 10 appointments already scheduled     Sep 27, 2018  3:15 PM CDT   (Arrive by 3:00 PM)   Return Visit with Toya Clayton MD   [facility] Metabolic Disorders ([facility])    [address] [place] [id number] [phone]              Who to contact     Please call your clinic at [phone] to:    Ask questions about your health    Make or cancel appointments    Discuss your medicines    Learn about your test results    Speak to your doctor            Additional Information About Your Visit        MyChart Information     Limbot is an electronic gateway that provides easy, online access to your medical records. With iDreamBooks, you can request a clinic appointment, read your test results, renew a prescription or communicate with your care team.     To sign up for Limbot visit the website at [email or web address]   You will be asked to enter the access code listed below, as well as some personal information. Please follow the directions to create your  "username and password.     Your access code is: [email or web address]  Expires: 2018  6:30 AM     Your access code will  in 90 days. If you need help or a new code, please contact your [facility] or call [phone] for assistance.        Care EveryWhere ID     This is your Care EveryWhere ID. This could be used by other organizations to access your [place] medical records  [id number]        Your Vitals Were     Pulse Height BMI (Body Mass Index)             68 1.651 m (5' 5\") 37.46 kg/m2          Blood Pressure from Last 3 Encounters:   18 106/73   17 126/84   17 139/85    Weight from Last 3 Encounters:   18 102.1 kg (225 lb 1.6 oz)   17 101.8 kg (224 lb 6.4 oz)   17 108 kg (238 lb)              We Performed the Following     DIET CONSULT COMPREHENSIVE          Today's Medication Changes          These changes are accurate as of 18  4:26 PM.  If you have any questions, ask your nurse or doctor.               These medicines have changed or have updated prescriptions.        Dose/Directions    ZOLOFT PO   This may have changed:  Another medication with the same name was removed. Continue taking this medication, and follow the directions you see here.   Changed by:  Toya Clayton MD        Dose:  50 mg   Take 50 mg by mouth daily   Refills:  0         Stop taking these medicines if you haven't already. Please contact your care team if you have questions.     HYDROcodone-acetaminophen 5-500 MG per tablet   Commonly known as:  VICODIN   Stopped by:  Toya Clayton MD           pimecrolimus 1 % cream   Commonly known as:  ELIDEL   Stopped by:  Toya Clayton MD                    Primary Care Provider Office Phone # Fax #    Alex Lewis [phone]       [facility] [address] [place] [address]        Equal Access to Services     JAROD MANCINI AH: Luther hooper hadasho Soomaali, waaxda luqadaha, qaybta kaalmada " vonnie rodgersedgardo noemikirk chiaan ah. So [facility] [phone].    ATENCIÓN: Si claula jj, tiene a sotelo disposición servicios gratuitos de asistencia lingüística. Nasima al [phone].    We comply with applicable federal civil rights laws and [place] laws. We do not discriminate on the basis of race, color, national origin, age, disability, sex, sexual orientation, or gender identity.            Thank you!     Thank you for choosing [facility] METABOLIC DISORDERS  for your care. Our goal is always to provide you with excellent care. Hearing back from our patients is one way we can continue to improve our services. Please take a few minutes to complete the written survey that you may receive in the mail after your visit with us. Thank you!             Your Updated Medication List - Protect others around you: Learn how to safely use, store and throw away your medicines at www.disposemymeds.org.          This list is accurate as of 2/22/18  4:26 PM.  Always use your most recent med list.                   Brand Name Dispense Instructions for use Diagnosis    atenolol 50 MG tablet    TENORMIN     Take 50 mg by mouth daily    OTC (ornithine transcarbamylase deficiency) (H), Protein-calorie malnutrition (H)       calcium-vitamin D 600-400 MG-UNIT per tablet    calcium 600 + D    60 tablet    Take 1 tablet by mouth 2 times daily (with meals).    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)       glycerol Phenylbutyrate 1.1 GM/ML solution    RAVICTI    306 mL    Take 3.4 mLs (3.74 g) by mouth 3 times daily With food    OTC (ornithine transcarbamylase deficiency) (H)       ibuprofen 400 MG tablet    ADVIL/MOTRIN     Take 400 mg by mouth every 6 hours as needed for moderate pain    OTC (ornithine transcarbamylase deficiency) (H), Protein-calorie malnutrition (H)       l-citrulline Powd     250 g    Take 1.8 g by mouth 4 times daily    OTC (ornithine transcarbamylase deficiency) (H)        levOCARNitine 1 GM/10ML solution    CARNITOR    465 mL    Take 5 mLs (500 mg) by mouth 3 times daily    OTC (ornithine transcarbamylase deficiency) (H), 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    OTC (ornithine transcarbamylase deficiency) (H)       omeprazole 20 MG CR capsule    priLOSEC     Take by mouth daily        ondansetron 4 MG ODT tab    ZOFRAN-ODT     Take by mouth every 12 hours as needed for nausea    OTC (ornithine transcarbamylase deficiency) (H), Protein-calorie malnutrition (H)       ZOLOFT PO      Take 50 mg by mouth daily

## 2018-02-23 LAB
(HCYS)2 SERPL-SCNC: NEGATIVE UMOL/DL
1ME-HIST SERPL-SCNC: 3 UMOL/DL (ref 0–2)
3ME-HISTIDINE SERPL-SCNC: <1 UMOL/DL (ref 0–1)
AAA SERPL-SCNC: NEGATIVE UMOL/DL (ref 0–6)
ALANINE SERPL-SCNC: NEGATIVE UMOL/DL
ALANINE SFR SERPL: 26 UMOL/DL (ref 22–62)
ANSERINE SERPL-SCNC: NEGATIVE UMOL/DL
ARGININE SERPL-SCNC: 8 UMOL/DL (ref 2–18)
ASPARAGINE SERPL-SCNC: 3 UMOL/DL (ref 1–5)
ASPARTATE SERPL-SCNC: <1 UMOL/DL (ref 0–4)
B-AIB SERPL-SCNC: NEGATIVE UMOL/DL
CARNOSINE SERPL-SCNC: NEGATIVE UMOL/DL
CITRULLINE SERPL-SCNC: 22.7 UMOL/DL (ref 1.3–6)
CYSTATHIONIN SERPL-SCNC: NEGATIVE UMOL/DL
CYSTINE SERPL-SCNC: 10 UMOL/DL (ref 3–15)
GLUTAMATE SERPL-SCNC: 66 UMOL/DL (ref 41–86)
GLUTAMATE SERPL-SCNC: 7 UMOL/DL (ref 0–16)
GLYCINE SERPL-SCNC: 14 UMOL/DL (ref 13–50)
HISTIDINE SERPL-SCNC: 8 UMOL/DL (ref 3–15)
ISOLEUCINE SERPL-SCNC: 6 UMOL/DL (ref 4–11)
LEUCINE SERPL-SCNC: 10 UMOL/DL (ref 8–21)
LYSINE SERPL-SCNC: 18 UMOL/DL (ref 6–26)
METHIONINE SERPL-SCNC: 3 UMOL/DL (ref 1–6)
OH-LYSINE SERPL-SCNC: NEGATIVE UMOL/DL
OH-PROLINE SERPL-SCNC: NEGATIVE UMOL/DL (ref 0–3)
ORNITHINE SERPL-SCNC: 7 UMOL/DL (ref 2–16)
PHE SERPL-SCNC: 5 UMOL/DL (ref 3–10)
PROLINE SERPL-SCNC: 15 UMOL/DL (ref 0–48)
SARCOSINE SERPL-SCNC: NEGATIVE UMOL/DL
SERINE SERPL-SCNC: 6 UMOL/DL (ref 4–18)
TAURINE SERPL-SCNC: 4 UMOL/DL (ref 7–32)
THREONINE SERPL-SCNC: 7 UMOL/DL (ref 5–25)
TYROSINE SERPL-SCNC: 7 UMOL/DL (ref 4–13)
VALINE SERPL-SCNC: 21 UMOL/DL (ref 8–46)

## 2018-02-26 LAB
ACYLCARNITINE SERPL-SCNC: 17 UMOL/L (ref 5–29)
CARN ESTERS/C0 SERPL-SRTO: 0.6 {RATIO} (ref 0.1–1)
CARNITINE FREE SERPL-SCNC: 29 UMOL/L (ref 25–60)
CARNITINE SERPL-SCNC: 46 UMOL/L (ref 34–86)

## 2018-03-01 NOTE — PROGRESS NOTES
CLINICAL NUTRITION SERVICES - PEDIATRIC ASSESSMENT NOTE      REASON FOR ASSESSMENT  Shira William is a 37 year old female seen by the dietitian for consult regarding OTC deficiency.      ANTHROPOMETRICS  Height: 165.2 cm  Weight: 102 kg  BMI: 37  Ideal body weight: 55 kg      Comments: weight the same since visit in August      NUTRITION HISTORY  Patient is on a protein-restricted diet of 40-45 g/day.      Patient brings in a food log today.  Typical intake:      Breakfast: poached egg + toast + banana, rice krispies + milk, woodall/egg/cheese sandwich + hash browns  Lunch: soup, beef stew + rice, BLT + grapes  Dinner: 2 tacos, hamburger + tater tos + (not written down)  Beverages: tea, coffee, water  Snacks: none logged      Food log average 1200 kcals/day and 36 grams/day.     LABS  Labs reviewed;          Amino Acids    GLUT: 66 (62)    ILE: 6 (7)    RADHA: 10 (12)    KIERRA: 21 (25)          Prealbumin - 19 (19)          BUN - 12 (11)      MEDICATIONS  Medications reviewed; includes MVI, calcium/Vit D (600/400) bid, Ravicti, citrulline      ASSESSED NUTRITION NEEDS:  Estimated Energy Needs: 20-25 kcal/kg  Estimated Protein Needs: 0.6-0.8 g/kg using IBW  Micronutrient Needs: 1200 mg calcium, 600 IU Vitamin D, 18 mg iron      NUTRITION DIAGNOSIS:  Impaired nutrient utilization related to diagnosis of OTC deficiency as evidenced by risk of hyperammonemia with stress/illness, catabolism, or very high protein intake.      Obesity related to excessive calorie intake and lack of exercise as evidenced by BMI in obese range.      INTERVENTIONS  Nutrition Prescription  Meet 100% estimated kcal, protein, amino acid, vitamin/mineral needs through low protein diet    Nutrition Education:   Discussed and reviewed intake and food log, discussed with patient.  Reviewed that according to food log, she is slightly shy of her protein goal.  Discussed having 1 good source/high value protein at every meal (~8-10 grams worth) and  options for these, as well as a snack containing protein such as string cheese, apple with peanut butter, or a yogurt.  Patient seems disappointed/down with herself as to no progress in weight loss; however enforced that no weight gain is also an accomplishment and especially throughout the winter months.  Encouraged her to focus on the future of eating 3 meals + 1-2 snacks/day (smaller, more frequent eating) with making healthy choices including protein for satiety.  Reviewed continuing to avoid sugar beverages and snacks that are empty calories.  Also encouraged working out as method of weight loss as well as positive for mood.  Patient had no other questions today.     Goals  1. Gradual weight loss/lower BMI  2. Meet >85% estimated nutrition needs through low protein diet  3. Ammonia/GLUT levels WNL    FOLLOW UP/MONITORING  Energy Intake  Macronutrient intake  Anthropometric measurements      Time spent with patient: 15 minutes

## 2018-06-13 DIAGNOSIS — E72.4 OTC (ORNITHINE TRANSCARBAMYLASE DEFICIENCY) (H): ICD-10-CM

## 2018-06-13 RX ORDER — CITRULLINE
1.8 POWDER (GRAM) MISCELLANEOUS 4 TIMES DAILY
Qty: 250 G | Refills: 12 | Status: SHIPPED | OUTPATIENT
Start: 2018-06-13 | End: 2020-07-08

## 2018-09-11 DIAGNOSIS — E72.4 OTC (ORNITHINE TRANSCARBAMYLASE DEFICIENCY) (H): ICD-10-CM

## 2018-09-21 DIAGNOSIS — E72.4 OTC (ORNITHINE TRANSCARBAMYLASE DEFICIENCY) (H): ICD-10-CM

## 2018-09-21 DIAGNOSIS — E71.42 CARNITINE DEFICIENCY DUE TO INBORN ERRORS OF METABOLISM (H): ICD-10-CM

## 2018-09-21 RX ORDER — LEVOCARNITINE 1 G/10ML
500 SOLUTION ORAL 3 TIMES DAILY
Qty: 465 ML | Refills: 1 | Status: SHIPPED | OUTPATIENT
Start: 2018-09-21 | End: 2018-10-02

## 2018-09-27 ENCOUNTER — OFFICE VISIT (OUTPATIENT)
Dept: ENDOCRINOLOGY | Facility: CLINIC | Age: 38
End: 2018-09-27
Payer: MEDICARE

## 2018-09-27 VITALS
BODY MASS INDEX: 38.32 KG/M2 | SYSTOLIC BLOOD PRESSURE: 103 MMHG | DIASTOLIC BLOOD PRESSURE: 73 MMHG | WEIGHT: 230 LBS | HEIGHT: 65 IN | HEART RATE: 72 BPM

## 2018-09-27 DIAGNOSIS — E72.4 OTC (ORNITHINE TRANSCARBAMYLASE DEFICIENCY) (H): ICD-10-CM

## 2018-09-27 DIAGNOSIS — E72.4 OTC (ORNITHINE TRANSCARBAMYLASE DEFICIENCY) (H): Primary | ICD-10-CM

## 2018-09-27 DIAGNOSIS — E55.9 VITAMIN D DEFICIENCY: ICD-10-CM

## 2018-09-27 LAB
ALBUMIN SERPL-MCNC: 3.4 G/DL (ref 3.4–5)
ALP SERPL-CCNC: 63 U/L (ref 40–150)
ALT SERPL W P-5'-P-CCNC: 23 U/L (ref 0–50)
ANION GAP SERPL CALCULATED.3IONS-SCNC: 8 MMOL/L (ref 3–14)
AST SERPL W P-5'-P-CCNC: 13 U/L (ref 0–45)
BILIRUB SERPL-MCNC: 0.4 MG/DL (ref 0.2–1.3)
BUN SERPL-MCNC: 14 MG/DL (ref 7–30)
CALCIUM SERPL-MCNC: 8.8 MG/DL (ref 8.5–10.1)
CHLORIDE SERPL-SCNC: 105 MMOL/L (ref 94–109)
CO2 SERPL-SCNC: 23 MMOL/L (ref 20–32)
CREAT SERPL-MCNC: 0.71 MG/DL (ref 0.52–1.04)
GFR SERPL CREATININE-BSD FRML MDRD: >90 ML/MIN/1.7M2
GLUCOSE SERPL-MCNC: 91 MG/DL (ref 70–99)
POTASSIUM SERPL-SCNC: 3.9 MMOL/L (ref 3.4–5.3)
PREALB SERPL IA-MCNC: 19 MG/DL (ref 15–45)
PROT SERPL-MCNC: 7.4 G/DL (ref 6.8–8.8)
SODIUM SERPL-SCNC: 136 MMOL/L (ref 133–144)

## 2018-09-27 PROCEDURE — 82306 VITAMIN D 25 HYDROXY: CPT | Performed by: MEDICAL GENETICS

## 2018-09-27 PROCEDURE — 82139 AMINO ACIDS QUAN 6 OR MORE: CPT | Performed by: MEDICAL GENETICS

## 2018-09-27 ASSESSMENT — PAIN SCALES - GENERAL: PAINLEVEL: NO PAIN (0)

## 2018-09-27 NOTE — LETTER
2018      RE: Shira William  [address]  [address]  [place] [address]       Adult Metabolism Clinic Return Visit  Name:  Shira William  :   1980  MRN:   [id number]  Date of Visit: Sep 27, 2018  PCP: Alex Lewis    Managing Met[facility] Center(s):  [facility].    Chief Complaint:  Ms. Shira William is a 37 year old female whom I saw in follow up in Metabolism Clinic for OTC deficiency.  Ms. William was brought to the visit by her parents but was seen without them. She also saw our dietitian at this visit.     Assessment:     Shira continues generally stable but with obvious predisposition to decompensation with illness or other stresses. It is important that she continue to recognize the symptoms of elevated ammonia and seek assistance promptly. She needs to be vigilant and taking her medications and in watching her intake.     Plan:    1. Testing ordered at this visit:   Orders Placed This Encounter   Procedures     DIET CONSULT COMPREHENSIVE     Amino acids plasma quantitative     Prealbumin     Vitamin D Deficiency     Carnitine free and total     Comprehensive metabolic panel   .    Results are as noted, below. Additional recommendations based on these laboratory results:  Glutamine and alanine were normal suggesting against chronic elevation in ammonia. Some amino acid's were mildly elevated consistent with a nonfasting sampling time point.  Citrulline was also elevated, consistent with recent dosage administration. The plasma protein profile suggests general protein sufficiency. Carnitine was adequate. Vitamin D was in the normal range. Liver enzyme tests and kidney function tests were normal. Electrolytes were normal.  2. Medications: Continue current treatment regimen, see below   3. Metabolic dietician follow up with Jenni Joyner RD, LD at this visit to review special dietary concerns. Metabolic diet should be  continued as prescribed.  See nutrition history, below. They discussed:  ---  Nutrition Education:   Discussed and reviewed continuing on low protein diet.     Reviewed weight changes, intake, and most recent labs.  -patient has been under outside stressors at home so have been having difficulty getting activity and paying attention to diet.  She is currently staying with her parents so this helps out with things with her parents helping her to have prepared meals and not skip meals.  Encouraged patient to continue on current plan and hopefully stress levels will subside soon.      Goals  1. Gradual weight loss/lower BMI  2. Meet >85% estimated nutrition needs through low protein diet  3. Ammonia/GLUT levels WNL  ---  4.   Continue to observe emergency precautions as previously discussed.  Our on-call metabolic service is available 24 hours/day by calling the page  ([phone]) and asking for the Genetics and Metabolism doctor on call.  5. Return to the Adult Metabolism Clinic in 6 months for follow-up.     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 He is currently in shelter and she is working to try to keep the apartment that they have been sharing.    Jenni had an episode of elevated ammonia in July. She was nauseous and thought she needed Zofran, but was found to have mild hyperammonemia. She was hospitalized for three days.    She indicated a high  level of stress related to financial and legal challenges in the family. She was particularly sad that the car that she had with her  will have to be given up. (She herself does not drive).      Interval History:  Shira was last seen in our clinic on  2/22/18.  Since the last clinic visit Shira was not seen for any urgent clinic visits due to her OTC deficiency.  She was seen in the emergency department  one times, and that visit was metabolic related.  She currently knows how to contact the 24 hour metabolic specialist on call and has a written emergency letter for this condition.  One hospitalizations occurred. Acute metabolic decompensation was noted during hospitalization. Three inpatient days were metabolic related with no days spent in the intensive care unit. She had no general anesthesia and no surgical procedures since the last clinic visit.      Other health services currently received are primary care, dietitian  Current research study participation: Longitudinal Study of Urea Cycle Disorders           Personal History:  Family History Update:   There was no new family history information elicited at this visit  Family History   Problem Relation Age of Onset     Genetic Disorder Mother      OTC deficiency     Genetic Disorder Brother      OTC deficiency      Jenni has been living with her  Eran in an apartment near their family. However, due to Eran's incarceration, she has been spinning somewhat more time with her parents. They are working through options with regard to safe housing for her living given her current status of living alone. She has a personal care attendant that spends some time with her  Stressors for patient and family:  Financial, incarceration of spouse  Current insurance status state/federal program (Medicaid/Medicare).   Neuropsychological evaluation was not performed since the last clinic visit.      I have reviewed Shira s past medical history, family history,  social history, medications and allergies as documented in the patient's electronic medical record.  There were no additional findings except as noted.     Review of Systems:   Constitional: Overweight  Eyes: wears glasses  Ears/Nose/Throat: negative - normal hearing  Respiratory: Occasionally needs albuterol  Cardiovascular: On atenolol  Gastrointestinal: Irritable bowel symptoms  Genitourinary: negative  Musculoskeletal: negative  Endocrine: negative  Hematologic/Lymphatic/Immunologic: negative  Integument: Vitiligo  Neurologic: intellectual disability  Psychiatric: Anxiety and some depression, currently on medications    Nutrition History:  Patient is on a protein-restricted diet of 40-45 g/day.      Patient brings in a food log today.  Typical intake:      Breakfast: woodall/egg/cheese biscuit, Rice Krispies w/banana, oatmeal with 1 slice toast and 1/2 glass milk  Lunch: Heredia's Pie (1 cup), a taco with cinnamon twists, hot dog with a salad  Dinner: Grilled cheese and fries, chicken and rice soup (1 cup) with bologna sandwich, mushroom/Swiss burger with grapes  Beverages: tea, coffee, water  Snacks: none logged    Medications:  Current Outpatient Prescriptions   Medication Sig Dispense Refill     atenolol (TENORMIN) 50 MG tablet Take 50 mg by mouth daily       calcium-vitamin D (CALCIUM 600 + D) 600-400 MG-UNIT per tablet Take 1 tablet by mouth 2 times daily (with meals). 60 tablet 0     glycerol Phenylbutyrate (RAVICTI) 1.1 GM/ML solution Take 3.4 mLs (3.74 g) by mouth 3 times daily With food 306 mL 11     ibuprofen (ADVIL,MOTRIN) 400 MG tablet Take 400 mg by mouth every 6 hours as needed for moderate pain       l-citrulline POWD Take 1.8 g by mouth 4 times daily 250 g 12     levOCARNitine (CARNITOR) 1 GM/10ML solution Take 5 mLs (500 mg) by mouth 3 times daily 465 mL 1     Multiple Vitamins-Iron (MULTIVITAMIN  /IRON) TABS Take 1 tablet daily.       omeprazole (PRILOSEC) 20 MG capsule Take by mouth daily        "ondansetron (ZOFRAN-ODT) 4 MG disintegrating tablet Take by mouth every 12 hours as needed for nausea       Sertraline HCl (ZOLOFT PO) Take 50 mg by mouth daily       Allergies:  Allergies   Allergen Reactions     Compazine [Prochlorperazine]      Erythromycin      Msg [Monosodium Glutamate]      Physical Examination:  Blood pressure 103/73, pulse 72, height 1.651 m (5' 5\"), weight 104.3 kg (230 lb).  Body surface area is 2.19 meters squared.  General: This was a well-developed, well-nourished female who responded appropriately to all requests during the examination.    Head and Neck:  She had hair of normal texture and distribution and her head was proportionate in appearance.The neck was supple without lymphadenopathy.   Eyes:  The pupils were equal, round, and reacted to light.   The conjunctivae were clear.   Ears:  Her ears were normal in architecture and placement.   Nose: The nose was clear.    Mouth and Throat: her dentition was normal.  The throat was without erythema.    Respiratory: The chest was clear to auscultation and had a symmetric appearance.    CV:  On examination of the heart, the rhythm was regular and there was no murmur.  T.    GI: The abdomen was soft and had normal bowel sounds.  There was no hepatosplenomegaly.    : I deferred a  examination.   Musculoskeletal: There was a full range of motion on the extremity exam, and normal muscular volume and bulk.   Neurologic: The neurologic exam was normal, with normal cranial nerves, normal deep tendon reflexes, normal sensation, and a normal gait. She had normal tone.   Integument: The skin was normal with no rashes or unusual pigmentation.    Results of laboratory studies collected at this visit and available when this note was completed:   Component Date Value Ref Range     A-Aminoadipic 09/27/2018 1  0 - 6 umol/dL     Alanine 09/27/2018 27  22 - 62 umol/dL     Anserine 09/27/2018 Negative  umol/dL     Arginine 09/27/2018 8  2 - 18 umol/dL "     Asparagine 09/27/2018 4  1 - 5 umol/dL     Aspartic Acid 09/27/2018 1  0 - 4 umol/dL     B-Alanine Plasma 09/27/2018 Negative  umol/dL     B-Aminoisobutyric 09/27/2018 Negative  umol/dL     Carnosine 09/27/2018 Negative  umol/dL     Citrulline 09/27/2018 6.9* 1.3 - 6.0 umol/dL     Cystathionine 09/27/2018 Negative  umol/dL     Cystine 09/27/2018 10  3 - 15 umol/dL     Glutamic Acid 09/27/2018 15  0 - 16 umol/dL     Glutamine 09/27/2018 70  41 - 86 umol/dL     Glycine 09/27/2018 16  13 - 50 umol/dL     Histidine 09/27/2018 8  3 - 15 umol/dL     1-Methylhistidine 09/27/2018 5* 0 - 2 umol/dL     3-Methylhistidine 09/27/2018 1  0 - 1 umol/dL     Homocysteine umol/dL 09/27/2018 Negative  umol/dL     Hydroxylysine 09/27/2018 Negative  umol/dL     Hydroxyproline 09/27/2018 2  0 - 3 umol/dL     Isoleucine 09/27/2018 17* 4 - 11 umol/dL     Leucine 09/27/2018 26* 8 - 21 umol/dL     Lysine 09/27/2018 28* 6 - 26 umol/dL     Methionine 09/27/2018 5  1 - 6 umol/dL     Ornithine 09/27/2018 11  2 - 16 umol/dL     Phenylalanine umol/dL 09/27/2018 7  3 - 10 umol/dL     Proline 09/27/2018 24  0 - 48 umol/dL     Sarcosine Plasma 09/27/2018 Negative  umol/dL     Serine 09/27/2018 9  4 - 18 umol/dL     Taurine 09/27/2018 5* 7 - 32 umol/dL     Threonine 09/27/2018 13  5 - 25 umol/dL     Tyrosine 09/27/2018 12  4 - 13 umol/dL     Valine 09/27/2018 39  8 - 46 umol/dL     Prealbumin 09/27/2018 19  15 - 45 mg/dL     Vitamin D Deficiency screening 09/27/2018 29  20 - 75 ug/L     Carnitine Free 09/27/2018 41  25 - 60 umol/L     CarnitineTotal 09/27/2018 63  34 - 86 umol/L     Carnitine Esterified 09/27/2018 22  5 - 29 umol/L     Carnitine Esterified/Free Ratio 09/27/2018 0.5  0.1 - 1.0     Sodium 09/27/2018 136  133 - 144 mmol/L     Potassium 09/27/2018 3.9  3.4 - 5.3 mmol/L     Chloride 09/27/2018 105  94 - 109 mmol/L     Carbon Dioxide 09/27/2018 23  20 - 32 mmol/L     Anion Gap 09/27/2018 8  3 - 14 mmol/L     Glucose 09/27/2018 91  70 -  99 mg/dL     Urea Nitrogen 09/27/2018 14  7 - 30 mg/dL     Creatinine 09/27/2018 0.71  0.52 - 1.04 mg/dL     GFR Estimate 09/27/2018 >90  >60 mL/min/1.7m2     Calcium 09/27/2018 8.8  8.5 - 10.1 mg/dL     Bilirubin Total 09/27/2018 0.4  0.2 - 1.3 mg/dL     Albumin 09/27/2018 3.4  3.4 - 5.0 g/dL     Protein Total 09/27/2018 7.4  6.8 - 8.8 g/dL     Alkaline Phosphatase 09/27/2018 63  40 - 150 U/L     ALT 09/27/2018 23  0 - 50 U/L     AST 09/27/2018 13  0 - 45 U/L     CHUN RODRIGUEZ M.D.  Professor and Director   Division of Genetics and Metabolism  Department of Pediatrics  [facility]    Routed to patient in Comm [facility]  Also to Alex Lewis

## 2018-09-27 NOTE — PROGRESS NOTES
Adult Metabolism Clinic Return Visit  Name:  Shira William  :   1980  MRN:   [id number]  Date of Visit: Sep 27, 2018  PCP: Alex Lewis    Managing Metabolic Center(s):  [facility].    Chief Complaint:  Ms. Shira William is a 37 year old female whom I saw in follow up in Metabolism Clinic for OTC deficiency.  Ms. William was brought to the visit by her parents but was seen without them. She also saw our dietitian at this visit.     Assessment:     Shira continues generally stable but with obvious predisposition to decompensation with illness or other stresses. It is important that she continue to recognize the symptoms of elevated ammonia and seek assistance promptly. She needs to be vigilant and taking her medications and in watching her intake.     Plan:    1. Testing ordered at this visit:   Orders Placed This Encounter   Procedures     DIET CONSULT COMPREHENSIVE     Amino acids plasma quantitative     Prealbumin     Vitamin D Deficiency     Carnitine free and total     Comprehensive metabolic panel   .    Results are as noted, below. Additional recommendations based on these laboratory results:  Glutamine and alanine were normal suggesting against chronic elevation in ammonia. Some amino acid's were mildly elevated consistent with a nonfasting sampling time point.  Citrulline was also elevated, consistent with recent dosage administration. The plasma protein profile suggests general protein sufficiency. Carnitine was adequate. Vitamin D was in the normal range. Liver enzyme tests and kidney function tests were normal. Electrolytes were normal.  2. Medications: Continue current treatment regimen, see below   3. Metabolic dietician follow up with Jenni Joyner RD, LD at this visit to review special dietary concerns. Metabolic diet should be continued as prescribed.  See nutrition history, below. They discussed:  ---  Nutrition Education:    Discussed and reviewed continuing on low protein diet.     Reviewed weight changes, intake, and most recent labs.  -patient has been under outside stressors at home so have been having difficulty getting activity and paying attention to diet.  She is currently staying with her parents so this helps out with things with her parents helping her to have prepared meals and not skip meals.  Encouraged patient to continue on current plan and hopefully stress levels will subside soon.      Goals  1. Gradual weight loss/lower BMI  2. Meet >85% estimated nutrition needs through low protein diet  3. Ammonia/GLUT levels WNL  ---  4.   Continue to observe emergency precautions as previously discussed.  Our on-call metabolic service is available 24 hours/day by calling the page  ([phone]) and asking for the Genetics and Metabolism doctor on call.  5. Return to the Adult Metabolism Clinic in 6 months for follow-up.     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 He is currently in senior care and she is working to try to keep the apartment that they have been sharing.    Jenni had an episode of elevated ammonia in July. She was nauseous and thought she needed Zofran, but was found to have mild hyperammonemia. She was hospitalized for three days.    She indicated a high level of stress related to financial and legal challenges in the family. She was particularly sad  that the car that she had with her  will have to be given up. (She herself does not drive).      Interval History:  Shira was last seen in our clinic on  2/22/18.  Since the last clinic visit Shira was not seen for any urgent clinic visits due to her OTC deficiency.  She was seen in the emergency department  one times, and that visit was metabolic related.  She currently knows how to contact the 24 hour metabolic specialist on call and has a written emergency letter for this condition.  One hospitalizations occurred. Acute metabolic decompensation was noted during hospitalization. Three inpatient days were metabolic related with no days spent in the intensive care unit. She had no general anesthesia and no surgical procedures since the last clinic visit.      Other health services currently received are primary care, dietitian  Current research study participation: Longitudinal Study of Urea Cycle Disorders           Personal History:  Family History Update:   There was no new family history information elicited at this visit  Family History   Problem Relation Age of Onset     Genetic Disorder Mother      OTC deficiency     Genetic Disorder Brother      OTC deficiency      Jenni has been living with her  Eran in an apartment near their family. However, due to Eran's incarceration, she has been spinning somewhat more time with her parents. They are working through options with regard to safe housing for her living given her current status of living alone. She has a personal care attendant that spends some time with her  Stressors for patient and family:  Financial, incarceration of spouse  Current insurance status state/federal program (Medicaid/Medicare).   Neuropsychological evaluation was not performed since the last clinic visit.      I have reviewed Shira s past medical history, family history, social history, medications and allergies as documented in the patient's electronic medical  record.  There were no additional findings except as noted.     Review of Systems:   Constitional: Overweight  Eyes: wears glasses  Ears/Nose/Throat: negative - normal hearing  Respiratory: Occasionally needs albuterol  Cardiovascular: On atenolol  Gastrointestinal: Irritable bowel symptoms  Genitourinary: negative  Musculoskeletal: negative  Endocrine: negative  Hematologic/Lymphatic/Immunologic: negative  Integument: Vitiligo  Neurologic: intellectual disability  Psychiatric: Anxiety and some depression, currently on medications    Nutrition History:  Patient is on a protein-restricted diet of 40-45 g/day.      Patient brings in a food log today.  Typical intake:      Breakfast: woodall/egg/cheese biscuit, Rice Krispies w/banana, oatmeal with 1 slice toast and 1/2 glass milk  Lunch: Heredia's Pie (1 cup), a taco with cinnamon twists, hot dog with a salad  Dinner: Grilled cheese and fries, chicken and rice soup (1 cup) with bologna sandwich, mushroom/Swiss burger with grapes  Beverages: tea, coffee, water  Snacks: none logged    Medications:  Current Outpatient Prescriptions   Medication Sig Dispense Refill     atenolol (TENORMIN) 50 MG tablet Take 50 mg by mouth daily       calcium-vitamin D (CALCIUM 600 + D) 600-400 MG-UNIT per tablet Take 1 tablet by mouth 2 times daily (with meals). 60 tablet 0     glycerol Phenylbutyrate (RAVICTI) 1.1 GM/ML solution Take 3.4 mLs (3.74 g) by mouth 3 times daily With food 306 mL 11     ibuprofen (ADVIL,MOTRIN) 400 MG tablet Take 400 mg by mouth every 6 hours as needed for moderate pain       l-citrulline POWD Take 1.8 g by mouth 4 times daily 250 g 12     levOCARNitine (CARNITOR) 1 GM/10ML solution Take 5 mLs (500 mg) by mouth 3 times daily 465 mL 1     Multiple Vitamins-Iron (MULTIVITAMIN  /IRON) TABS Take 1 tablet daily.       omeprazole (PRILOSEC) 20 MG capsule Take by mouth daily       ondansetron (ZOFRAN-ODT) 4 MG disintegrating tablet Take by mouth every 12 hours as needed  "for nausea       Sertraline HCl (ZOLOFT PO) Take 50 mg by mouth daily       Allergies:  Allergies   Allergen Reactions     Compazine [Prochlorperazine]      Erythromycin      Msg [Monosodium Glutamate]      Physical Examination:  Blood pressure 103/73, pulse 72, height 1.651 m (5' 5\"), weight 104.3 kg (230 lb).  Body surface area is 2.19 meters squared.  General: This was a well-developed, well-nourished female who responded appropriately to all requests during the examination.    Head and Neck:  She had hair of normal texture and distribution and her head was proportionate in appearance.The neck was supple without lymphadenopathy.   Eyes:  The pupils were equal, round, and reacted to light.   The conjunctivae were clear.   Ears:  Her ears were normal in architecture and placement.   Nose: The nose was clear.    Mouth and Throat: her dentition was normal.  The throat was without erythema.    Respiratory: The chest was clear to auscultation and had a symmetric appearance.    CV:  On examination of the heart, the rhythm was regular and there was no murmur.  T.    GI: The abdomen was soft and had normal bowel sounds.  There was no hepatosplenomegaly.    : I deferred a  examination.   Musculoskeletal: There was a full range of motion on the extremity exam, and normal muscular volume and bulk.   Neurologic: The neurologic exam was normal, with normal cranial nerves, normal deep tendon reflexes, normal sensation, and a normal gait. She had normal tone.   Integument: The skin was normal with no rashes or unusual pigmentation.    Results of laboratory studies collected at this visit and available when this note was completed:   Component Date Value Ref Range     A-Aminoadipic 09/27/2018 1  0 - 6 umol/dL     Alanine 09/27/2018 27  22 - 62 umol/dL     Anserine 09/27/2018 Negative  umol/dL     Arginine 09/27/2018 8  2 - 18 umol/dL     Asparagine 09/27/2018 4  1 - 5 umol/dL     Aspartic Acid 09/27/2018 1  0 - 4 umol/dL     " B-Alanine Plasma 09/27/2018 Negative  umol/dL     B-Aminoisobutyric 09/27/2018 Negative  umol/dL     Carnosine 09/27/2018 Negative  umol/dL     Citrulline 09/27/2018 6.9* 1.3 - 6.0 umol/dL     Cystathionine 09/27/2018 Negative  umol/dL     Cystine 09/27/2018 10  3 - 15 umol/dL     Glutamic Acid 09/27/2018 15  0 - 16 umol/dL     Glutamine 09/27/2018 70  41 - 86 umol/dL     Glycine 09/27/2018 16  13 - 50 umol/dL     Histidine 09/27/2018 8  3 - 15 umol/dL     1-Methylhistidine 09/27/2018 5* 0 - 2 umol/dL     3-Methylhistidine 09/27/2018 1  0 - 1 umol/dL     Homocysteine umol/dL 09/27/2018 Negative  umol/dL     Hydroxylysine 09/27/2018 Negative  umol/dL     Hydroxyproline 09/27/2018 2  0 - 3 umol/dL     Isoleucine 09/27/2018 17* 4 - 11 umol/dL     Leucine 09/27/2018 26* 8 - 21 umol/dL     Lysine 09/27/2018 28* 6 - 26 umol/dL     Methionine 09/27/2018 5  1 - 6 umol/dL     Ornithine 09/27/2018 11  2 - 16 umol/dL     Phenylalanine umol/dL 09/27/2018 7  3 - 10 umol/dL     Proline 09/27/2018 24  0 - 48 umol/dL     Sarcosine Plasma 09/27/2018 Negative  umol/dL     Serine 09/27/2018 9  4 - 18 umol/dL     Taurine 09/27/2018 5* 7 - 32 umol/dL     Threonine 09/27/2018 13  5 - 25 umol/dL     Tyrosine 09/27/2018 12  4 - 13 umol/dL     Valine 09/27/2018 39  8 - 46 umol/dL     Prealbumin 09/27/2018 19  15 - 45 mg/dL     Vitamin D Deficiency screening 09/27/2018 29  20 - 75 ug/L     Carnitine Free 09/27/2018 41  25 - 60 umol/L     CarnitineTotal 09/27/2018 63  34 - 86 umol/L     Carnitine Esterified 09/27/2018 22  5 - 29 umol/L     Carnitine Esterified/Free Ratio 09/27/2018 0.5  0.1 - 1.0     Sodium 09/27/2018 136  133 - 144 mmol/L     Potassium 09/27/2018 3.9  3.4 - 5.3 mmol/L     Chloride 09/27/2018 105  94 - 109 mmol/L     Carbon Dioxide 09/27/2018 23  20 - 32 mmol/L     Anion Gap 09/27/2018 8  3 - 14 mmol/L     Glucose 09/27/2018 91  70 - 99 mg/dL     Urea Nitrogen 09/27/2018 14  7 - 30 mg/dL     Creatinine 09/27/2018 0.71  0.52 -  1.04 mg/dL     GFR Estimate 09/27/2018 >90  >60 mL/min/1.7m2     Calcium 09/27/2018 8.8  8.5 - 10.1 mg/dL     Bilirubin Total 09/27/2018 0.4  0.2 - 1.3 mg/dL     Albumin 09/27/2018 3.4  3.4 - 5.0 g/dL     Protein Total 09/27/2018 7.4  6.8 - 8.8 g/dL     Alkaline Phosphatase 09/27/2018 63  40 - 150 U/L     ALT 09/27/2018 23  0 - 50 U/L     AST 09/27/2018 13  0 - 45 U/L     CUHN RODRIGUEZ M.D.  Professor and Director   Division of Genetics and Metabolism  Department of Pediatrics  [facility]    Routed to patient in Comm Mgt  Also to Alex Lewis

## 2018-09-27 NOTE — NURSING NOTE
Chief Complaint   Patient presents with     RECHECK     6 month f/u OTC deficiency      Casandra Daniels CMA

## 2018-09-27 NOTE — MR AVS SNAPSHOT
After Visit Summary   9/27/2018    Shira William    MRN: [id number]           Patient Information     Date Of Birth          1980        Visit Information        Provider Department      9/27/2018 12:45 PM Toya Clayton MD [facility] Metabolic Disorders        Today's Diagnoses     OTC (ornithine transcarbamylase deficiency)- see Emergency care information in letters tab dated 2/27/12    -  1    Vitamin D deficiency          Care Instructions    Metabolism Clinic    Maintain metabolic diet and medications.  Call if any general care questions arise - contact our nurse coordinator Sharyn Mattson at [phone].      Contact the metabolic doctor on-call if any immediate need because of illness - [phone]            Follow-ups after your visit        Follow-up notes from your care team     Return in about 6 months (around 3/27/2019).      Your next 10 appointments already scheduled     Sep 27, 2018  2:30 PM CDT   LAB with  LAB   [facility] ([facility])    [address] [place] [id number] [phone]           Please do not eat 10-12 hours before your appointment if you are coming in fasting for labs on lipids, cholesterol, or glucose (sugar). This does not apply to pregnant women. Water, hot tea and black coffee (with nothing added) are okay. Do not drink other fluids, diet soda or chew gum.            Mar 28, 2019  1:15 PM CDT   (Arrive by 1:00 PM)   Return Visit with Toya Clayton MD   [facility] Metabolic Disorders ([facility])    [address] [place] [id number] [phone]              Future tests that were ordered for you today     Open Future Orders        Priority Expected Expires Ordered    Amino acids plasma quantitative Routine  9/27/2019 9/27/2018    Prealbumin Routine  9/27/2019 9/27/2018    Vitamin D Deficiency Routine  9/27/2019 9/27/2018    Carnitine free and total  "Routine  2019    Comprehensive metabolic panel Routine  2019            Who to contact     Please call your clinic at [phone] to:    Ask questions about your health    Make or cancel appointments    Discuss your medicines    Learn about your test results    Speak to your doctor            Additional Information About Your Visit        MyChart Information     [employer] is an electronic gateway that provides easy, online access to your medical records. With Lightbox, you can request a clinic appointment, read your test results, renew a prescription or communicate with your care team.     To sign up for [employer] visit the website at [email or web address]   You will be asked to enter the access code listed below, as well as some personal information. Please follow the directions to create your username and password.     Your access code is: [email or web address]  Expires: 2018  6:30 AM     Your access code will  in 90 days. If you need help or a new code, please contact your [facility] or call [phone] for assistance.        Care EveryWhere ID     This is your Care EveryWhere ID. This could be used by other organizations to access your [facility] medical records  [id number]        Your Vitals Were     Pulse Height BMI (Body Mass Index)             72 1.651 m (5' 5\") 38.27 kg/m2          Blood Pressure from Last 3 Encounters:   18 103/73   18 106/73   17 126/84    Weight from Last 3 Encounters:   18 104.3 kg (230 lb)   18 102.1 kg (225 lb 1.6 oz)   17 101.8 kg (224 lb 6.4 oz)              We Performed the Following     DIET CONSULT COMPREHENSIVE        Primary Care Provider Office Phone # Fax #    Alex Joshua [phone]       [facility] [address] [address]        Equal Access to Services     JAROD MANCINI AH: Luther mcginniso Sojavier, waaxda lubamadarobby, qaybta " vonnie preciadoedgardo fletcher ah. So [facility] [phone].    ATENCIÓN: Si jaelyn gardner, tiene a sotelo disposición servicios gratuitos de asistencia lingüística. Nasima al [phone].    We comply with applicable federal civil rights laws and [place] laws. We do not discriminate on the basis of race, color, national origin, age, disability, sex, sexual orientation, or gender identity.            Thank you!     Thank you for choosing [facility] METABOLIC DISORDERS  for your care. Our goal is always to provide you with excellent care. Hearing back from our patients is one way we can continue to improve our services. Please take a few minutes to complete the written survey that you may receive in the mail after your visit with us. Thank you!             Your Updated Medication List - Protect others around you: Learn how to safely use, store and throw away your medicines at www.disposemymeds.org.          This list is accurate as of 9/27/18  2:28 PM.  Always use your most recent med list.                   Brand Name Dispense Instructions for use Diagnosis    atenolol 50 MG tablet    TENORMIN     Take 50 mg by mouth daily    OTC (ornithine transcarbamylase deficiency) (H), Protein-calorie malnutrition (H)       calcium carbonate 600 mg-vitamin D 400 units 600-400 MG-UNIT per tablet    calcium 600 + D    60 tablet    Take 1 tablet by mouth 2 times daily (with meals).    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)       glycerol Phenylbutyrate 1.1 GM/ML solution    RAVICTI    306 mL    Take 3.4 mLs (3.74 g) by mouth 3 times daily With food    OTC (ornithine transcarbamylase deficiency) (H)       ibuprofen 400 MG tablet    ADVIL/MOTRIN     Take 400 mg by mouth every 6 hours as needed for moderate pain    OTC (ornithine transcarbamylase deficiency) (H), Protein-calorie malnutrition (H)       l-citrulline Powd     250 g    Take 1.8 g by mouth 4 times daily    OTC (ornithine  transcarbamylase deficiency) (H)       levOCARNitine 1 GM/10ML solution    CARNITOR    465 mL    Take 5 mLs (500 mg) by mouth 3 times daily    OTC (ornithine transcarbamylase deficiency) (H), 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    OTC (ornithine transcarbamylase deficiency) (H)       omeprazole 20 MG CR capsule    priLOSEC     Take by mouth daily        ondansetron 4 MG ODT tab    ZOFRAN-ODT     Take by mouth every 12 hours as needed for nausea    OTC (ornithine transcarbamylase deficiency) (H), Protein-calorie malnutrition (H)       ZOLOFT PO      Take 50 mg by mouth daily

## 2018-09-27 NOTE — Clinical Note
2018       RE: Shira William  [address]  [address]  [place] [address]     Dear Colleague,    Thank you for referring your patient, Shira Wililam, to the [facility] METABOLIC DISORDERS at [facility]. Please see a copy of my visit note below.    Adult Metabolism Clinic Return Visit  Name:  Shira William  :   1980  MRN:   [id number]  Date of Visit: Sep 27, 2018  PCP: Alex Lewis    Immunization status is: {IMMUNIZATION STATUS:864738782}.  Managing Metabolic Center(s):  [facility] Adult Metabolism [facility].    Chief Complaint:  Ms. Shira William is a 37 year old female whom I saw in follow up in Metabolism Clinic for ***.  Ms. William was accompanied to this visit by her  {FAMILY MEMBERS SHORT:665751}. She also saw our {OTHER PROVIDERS:253922219} at this visit.     Assessment:    ***     Plan:    1. Testing ordered at this visit:   Orders Placed This Encounter   Procedures     DIET CONSULT COMPREHENSIVE     Amino acids plasma quantitative     Prealbumin     Vitamin D Deficiency     Carnitine free and total     Comprehensive metabolic panel   .    Results are as noted, below. Additional recommendations based on these laboratory results:  ***  2. Medications: Continue ***   3. Metabolic dietician follow up with Jenni Joyner RD, LD at this visit to review special dietary concerns. Metabolic diet should be continued as prescribed.  See nutrition history, below. They discussed:  ---  ***  ---  4.   Continue to observe emergency precautions as previously discussed.  Our on-call metabolic service is available 24 hours/day by calling the page  ([phone]) and asking for the Genetics and Metabolism doctor on call.  6. Return to the Adult Metabolism Clinic in *** {WEEKS OR MONTHS:662016} for follow-up.     7.  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      Interval History:  Shira was last seen in our clinic on ***.  Since the last clinic visit Shira was seen for *** urgent clinic visits due to ***.  She was seen in the emergency department *** times, and *** of those visits were metabolic related.  She currently {EMERGENCY:262133192}.  *** hospitalizations occurred. Acute metabolic decompensation was noted during *** of those hospitalizations.  *** inpatient days were metabolic related with *** days spent in the intensive care unit. She had {GENERAL ANESTHESIA:883895097::\"no general anesthesia\"} and {HAD SURGICAL PROCEDURES:273751621::\"no surgical procedures\"} for *** since the last clinic visit.  Reported surgical complications were ***.      Other health services currently received are primary care***, {OTHER HEALTH SERVICES:505312230}  Current research study participation:  ***.             Personal History:  Family History Update:  *** There was no new family history information elicited at this visit  Family History   Problem Relation Age of Onset     Genetic Disorder Mother      OTC deficiency     Genetic Disorder Brother      OTC deficiency     Lives with {Household:078160}  Stressors for patient and family: ***  Community resources received currently are {COMMUNITY RESOURCES:[id number]}.  Current insurance status {CURRENT INSURANCE STATUS:[id number]}.   Neuropsychological evaluation {NEUROPSYCHOLOGICAL EVALUATION:194313217}.    Behavioral concerns: {BEHAVIORAL CONCERNS:606353514}.    Special education {SPECIAL " EDUCATION:427795030} services currently received include {SPECIAL EDUCATION CLASSIFICATION:188969768}.    Education/Vocation: {SCHOOL/WORK CIRCUMSTANCES:853488254}      I have reviewed Shira s past medical history, family history, social history, medications and allergies as documented in the patient's electronic medical record.  There were no additional findings except as noted.     Review of Systems: ***  Constitional: Overweight  Eyes: wears glasses  Ears/Nose/Throat: negative - normal hearing  Respiratory: Occasionally needs albuterol  Cardiovascular: On atenolol  Gastrointestinal: Irritable bowel symptoms  Genitourinary: negative  Musculoskeletal: negative  Endocrine: negative  Hematologic/Lymphatic/Immunologic: negative  Integument: Vitiligo  Neurologic: intellectual disability  Psychiatric: Anxiety and some depression, currently on medications    Nutrition History:  ***    Medications:  Current Outpatient Prescriptions   Medication Sig Dispense Refill     atenolol (TENORMIN) 50 MG tablet Take 50 mg by mouth daily       calcium-vitamin D (CALCIUM 600 + D) 600-400 MG-UNIT per tablet Take 1 tablet by mouth 2 times daily (with meals). 60 tablet 0     glycerol Phenylbutyrate (RAVICTI) 1.1 GM/ML solution Take 3.4 mLs (3.74 g) by mouth 3 times daily With food 306 mL 11     ibuprofen (ADVIL,MOTRIN) 400 MG tablet Take 400 mg by mouth every 6 hours as needed for moderate pain       l-citrulline POWD Take 1.8 g by mouth 4 times daily 250 g 12     levOCARNitine (CARNITOR) 1 GM/10ML solution Take 5 mLs (500 mg) by mouth 3 times daily 465 mL 1     Multiple Vitamins-Iron (MULTIVITAMIN  /IRON) TABS Take 1 tablet daily.       omeprazole (PRILOSEC) 20 MG capsule Take by mouth daily       ondansetron (ZOFRAN-ODT) 4 MG disintegrating tablet Take by mouth every 12 hours as needed for nausea       Sertraline HCl (ZOLOFT PO) Take 50 mg by mouth daily       Allergies:  Allergies   Allergen Reactions     Compazine  "[Prochlorperazine]      Erythromycin      Msg [Monosodium Glutamate]      Physical Examination:  Blood pressure 103/73, pulse 72, height 1.651 m (5' 5\"), weight 104.3 kg (230 lb).  Body surface area is 2.19 meters squared.  General: This was a well-developed, well-nourished female who responded appropriately to all requests during the examination.    Head and Neck:  She had hair of normal texture and distribution and her head was proportionate in appearance.The neck was supple without lymphadenopathy.   Eyes:  The pupils were equal, round, and reacted to light.   The conjunctivae were clear.   Ears:  Her ears were normal in architecture and placement.   Nose: The nose was clear.    Mouth and Throat: her dentition was normal.  The throat was without erythema.    Respiratory: The chest was clear to auscultation and had a symmetric appearance.    CV:  On examination of the heart, the rhythm was regular and there was no murmur.  The peripheral pulses were normal.    GI: The abdomen was soft and had normal bowel sounds.  There was no hepatosplenomegaly.    : I deferred a  examination.   Musculoskeletal: There was a full range of motion on the extremity exam, and normal muscular volume and bulk.   Neurologic: The neurologic exam was normal, with normal cranial nerves, normal deep tendon reflexes, normal sensation, and a normal gait. She had normal tone.   Integument: The skin was normal with no rashes or unusual pigmentation.    Results of laboratory studies collected at this visit and available when this note was completed:   Results for orders placed or performed in visit on 02/22/18   Amino acids plasma quantitative   Result Value Ref Range    A-Aminoadipic Negative 0 - 6 umol/dL    Alanine 26 22 - 62 umol/dL    Anserine Negative umol/dL    Arginine 8 2 - 18 umol/dL    Asparagine 3 1 - 5 umol/dL    Aspartic Acid <1 0 - 4 umol/dL    B-Alanine Plasma Negative umol/dL    B-Aminoisobutyric Negative umol/dL    Carnosine " Negative umol/dL    Citrulline 22.7 (H) 1.3 - 6.0 umol/dL    Cystathionine Negative umol/dL    Cystine 10 3 - 15 umol/dL    Glutamic Acid 7 0 - 16 umol/dL    Glutamine 66 41 - 86 umol/dL    Glycine 14 13 - 50 umol/dL    Histidine 8 3 - 15 umol/dL    1-Methylhistidine 3 (H) 0 - 2 umol/dL    3-Methylhistidine <1 0 - 1 umol/dL    Homocysteine umol/dL Negative umol/dL    Hydroxylysine Negative umol/dL    Hydroxyproline Negative 0 - 3 umol/dL    Isoleucine 6 4 - 11 umol/dL    Leucine 10 8 - 21 umol/dL    Lysine 18 6 - 26 umol/dL    Methionine 3 1 - 6 umol/dL    Ornithine 7 2 - 16 umol/dL    Phenylalanine umol/dL 5 3 - 10 umol/dL    Proline 15 0 - 48 umol/dL    Sarcosine Plasma Negative umol/dL    Serine 6 4 - 18 umol/dL    Taurine 4 (L) 7 - 32 umol/dL    Threonine 7 5 - 25 umol/dL    Tyrosine 7 4 - 13 umol/dL    Valine 21 8 - 46 umol/dL   Prealbumin   Result Value Ref Range    Prealbumin 19 15 - 45 mg/dL   Carnitine free and total   Result Value Ref Range    Carnitine Free 29 25 - 60 umol/L    CarnitineTotal 46 34 - 86 umol/L    Carnitine Esterified 17 5 - 29 umol/L    Carnitine Esterified/Free Ratio 0.6 0.1 - 1.0   Comprehensive metabolic panel   Result Value Ref Range    Sodium 138 133 - 144 mmol/L    Potassium 3.8 3.4 - 5.3 mmol/L    Chloride 108 94 - 109 mmol/L    Carbon Dioxide 23 20 - 32 mmol/L    Anion Gap 7 3 - 14 mmol/L    Glucose 94 70 - 99 mg/dL    Urea Nitrogen 12 7 - 30 mg/dL    Creatinine 0.67 0.52 - 1.04 mg/dL    GFR Estimate >90 >60 mL/min/1.7m2    GFR Estimate If Black >90 >60 mL/min/1.7m2    Calcium 8.6 8.5 - 10.1 mg/dL    Bilirubin Total 0.4 0.2 - 1.3 mg/dL    Albumin 3.8 3.4 - 5.0 g/dL    Protein Total 7.8 6.8 - 8.8 g/dL    Alkaline Phosphatase 64 40 - 150 U/L    ALT 27 0 - 50 U/L    AST 14 0 - 45 U/L   CBC with platelets differential   Result Value Ref Range    WBC 6.1 4.0 - 11.0 10e9/L    RBC Count 4.82 3.8 - 5.2 10e12/L    Hemoglobin 13.5 11.7 - 15.7 g/dL    Hematocrit 41.2 35.0 - 47.0 %    MCV 86  78 - 100 fl    MCH 28.0 26.5 - 33.0 pg    MCHC 32.8 31.5 - 36.5 g/dL    RDW 14.2 10.0 - 15.0 %    Platelet Count 211 150 - 450 10e9/L    Diff Method Automated Method     % Neutrophils 57.5 %    % Lymphocytes 35.4 %    % Monocytes 4.9 %    % Eosinophils 1.5 %    % Basophils 0.5 %    % Immature Granulocytes 0.2 %    Nucleated RBCs 0 0 /100    Absolute Neutrophil 3.5 1.6 - 8.3 10e9/L    Absolute Lymphocytes 2.2 0.8 - 5.3 10e9/L    Absolute Monocytes 0.3 0.0 - 1.3 10e9/L    Absolute Eosinophils 0.1 0.0 - 0.7 10e9/L    Absolute Basophils 0.0 0.0 - 0.2 10e9/L    Abs Immature Granulocytes 0.0 0 - 0.4 10e9/L    Absolute Nucleated RBC 0.0        TOYA RODRIGUEZ M.D.  Professor and Director   Division of Genetics and Metabolism  Department of Pediatrics  [facility]    Routed to patient in Comm Mgt  Also to Alex Lewis  ***      Again, thank you for allowing me to participate in the care of your patient.      Sincerely,    Toya Rodriguez MD

## 2018-09-27 NOTE — PATIENT INSTRUCTIONS
Metabolism Clinic    Maintain metabolic diet and medications.  Call if any general care questions arise - contact our nurse coordinator Sharyn Mattson at [phone].      Contact the metabolic doctor on-call if any immediate need because of illness - [phone]

## 2018-09-27 NOTE — LETTER
2018       RE: Shira William  [address]  [address]  [place] [address]     Dear Colleague,    Thank you for referring your patient, Shira William, to the [facility] METABOLIC DISORDERS at [facility]. Please see a copy of my visit note below.    Adult Metabolism Clinic Return Visit  Name:  Shira William  :   1980  MRN:   [id number]  Date of Visit: Sep 27, 2018  PCP: Alex Lewis    Managing Metabolic Center(s):  [facility] Adult Metabolism Clinic.    Chief Complaint:  Ms. Shira William is a 37 year old female whom I saw in follow up in Metabolism Clinic for OTC deficiency.  Ms. William was brought to the visit by her parents but was seen without them. She also saw our dietitian at this visit.     Assessment:     Shira continues generally stable but with obvious predisposition to decompensation with illness or other stresses. It is important that she continue to recognize the symptoms of elevated ammonia and seek assistance promptly. She needs to be vigilant and taking her medications and in watching her intake.     Plan:    1. Testing ordered at this visit:   Orders Placed This Encounter   Procedures     DIET CONSULT COMPREHENSIVE     Amino acids plasma quantitative     Prealbumin     Vitamin D Deficiency     Carnitine free and total     Comprehensive metabolic panel   .    Results are as noted, below. Additional recommendations based on these laboratory results:  Glutamine and alanine were normal suggesting against chronic elevation in ammonia. Some amino acid's were mildly elevated consistent with a nonfasting sampling time point.  Citrulline was also elevated, consistent with recent dosage administration. The plasma protein profile suggests general protein sufficiency. Carnitine was adequate. Vitamin D was in the normal range. Liver enzyme tests and kidney function tests were normal. Electrolytes were  normal.  2. Medications: Continue current treatment regimen, see below   3. Metabolic dietician follow up with Jenni Joyner, RD, LD at this visit to review special dietary concerns. Metabolic diet should be continued as prescribed.  See nutrition history, below. They discussed:  ---  Nutrition Education:   Discussed and reviewed continuing on low protein diet.     Reviewed weight changes, intake, and most recent labs.  -patient has been under outside stressors at home so have been having difficulty getting activity and paying attention to diet.  She is currently staying with her parents so this helps out with things with her parents helping her to have prepared meals and not skip meals.  Encouraged patient to continue on current plan and hopefully stress levels will subside soon.      Goals  1. Gradual weight loss/lower BMI  2. Meet >85% estimated nutrition needs through low protein diet  3. Ammonia/GLUT levels WNL  ---  4.   Continue to observe emergency precautions as previously discussed.  Our on-call metabolic service is available 24 hours/day by calling the page  ([phone]) and asking for the Genetics and Metabolism doctor on call.  5. Return to the Adult Metabolism Clinic in 6 months for follow-up.     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 He is currently in senior care and she is working to try to keep the apartment that they have been  sharing.    Jenni had an episode of elevated ammonia in July. She was nauseous and thought she needed Zofran, but was found to have mild hyperammonemia. She was hospitalized for three days.    She indicated a high level of stress related to financial and legal challenges in the family. She was particularly sad that the car that she had with her  will have to be given up. (She herself does not drive).      Interval History:  Shira was last seen in our clinic on  2/22/18.  Since the last clinic visit Shira was not seen for any urgent clinic visits due to her OTC deficiency.  She was seen in the emergency department  one times, and that visit was metabolic related.  She currently knows how to contact the 24 hour metabolic specialist on call and has a written emergency letter for this condition.  One hospitalizations occurred. Acute metabolic decompensation was noted during hospitalization. Three inpatient days were metabolic related with no days spent in the intensive care unit. She had no general anesthesia and no surgical procedures since the last clinic visit.      Other health services currently received are primary care, dietitian  Current research study participation: Longitudinal Study of Urea Cycle Disorders           Personal History:  Family History Update:   There was no new family history information elicited at this visit  Family History   Problem Relation Age of Onset     Genetic Disorder Mother      OTC deficiency     Genetic Disorder Brother      OTC deficiency      Jenni has been living with her  Eran in an apartment near their family. However, due to Eran's incarceration, she has been spinning somewhat more time with her parents. They are working through options with regard to safe housing for her living given her current status of living alone. She has a personal care attendant that spends some time with her  Stressors for patient and family:  Financial, incarceration of  spouse  Current insurance status state/federal program (Medicaid/Medicare).   Neuropsychological evaluation was not performed since the last clinic visit.      I have reviewed Shira s past medical history, family history, social history, medications and allergies as documented in the patient's electronic medical record.  There were no additional findings except as noted.     Review of Systems:   Constitional: Overweight  Eyes: wears glasses  Ears/Nose/Throat: negative - normal hearing  Respiratory: Occasionally needs albuterol  Cardiovascular: On atenolol  Gastrointestinal: Irritable bowel symptoms  Genitourinary: negative  Musculoskeletal: negative  Endocrine: negative  Hematologic/Lymphatic/Immunologic: negative  Integument: Vitiligo  Neurologic: intellectual disability  Psychiatric: Anxiety and some depression, currently on medications    Nutrition History:  Patient is on a protein-restricted diet of 40-45 g/day.      Patient brings in a food log today.  Typical intake:      Breakfast: woodall/egg/cheese biscuit, Rice Krispies w/banana, oatmeal with 1 slice toast and 1/2 glass milk  Lunch: Heredia's Pie (1 cup), a taco with cinnamon twists, hot dog with a salad  Dinner: Grilled cheese and fries, chicken and rice soup (1 cup) with bologna sandwich, mushroom/Swiss burger with grapes  Beverages: tea, coffee, water  Snacks: none logged    Medications:  Current Outpatient Prescriptions   Medication Sig Dispense Refill     atenolol (TENORMIN) 50 MG tablet Take 50 mg by mouth daily       calcium-vitamin D (CALCIUM 600 + D) 600-400 MG-UNIT per tablet Take 1 tablet by mouth 2 times daily (with meals). 60 tablet 0     glycerol Phenylbutyrate (RAVICTI) 1.1 GM/ML solution Take 3.4 mLs (3.74 g) by mouth 3 times daily With food 306 mL 11     ibuprofen (ADVIL,MOTRIN) 400 MG tablet Take 400 mg by mouth every 6 hours as needed for moderate pain       l-citrulline POWD Take 1.8 g by mouth 4 times daily 250 g 12     levOCARNitine  "(CARNITOR) 1 GM/10ML solution Take 5 mLs (500 mg) by mouth 3 times daily 465 mL 1     Multiple Vitamins-Iron (MULTIVITAMIN  /IRON) TABS Take 1 tablet daily.       omeprazole (PRILOSEC) 20 MG capsule Take by mouth daily       ondansetron (ZOFRAN-ODT) 4 MG disintegrating tablet Take by mouth every 12 hours as needed for nausea       Sertraline HCl (ZOLOFT PO) Take 50 mg by mouth daily       Allergies:  Allergies   Allergen Reactions     Compazine [Prochlorperazine]      Erythromycin      Msg [Monosodium Glutamate]      Physical Examination:  Blood pressure 103/73, pulse 72, height 1.651 m (5' 5\"), weight 104.3 kg (230 lb).  Body surface area is 2.19 meters squared.  General: This was a well-developed, well-nourished female who responded appropriately to all requests during the examination.    Head and Neck:  She had hair of normal texture and distribution and her head was proportionate in appearance.The neck was supple without lymphadenopathy.   Eyes:  The pupils were equal, round, and reacted to light.   The conjunctivae were clear.   Ears:  Her ears were normal in architecture and placement.   Nose: The nose was clear.    Mouth and Throat: her dentition was normal.  The throat was without erythema.    Respiratory: The chest was clear to auscultation and had a symmetric appearance.    CV:  On examination of the heart, the rhythm was regular and there was no murmur.  T.    GI: The abdomen was soft and had normal bowel sounds.  There was no hepatosplenomegaly.    : I deferred a  examination.   Musculoskeletal: There was a full range of motion on the extremity exam, and normal muscular volume and bulk.   Neurologic: The neurologic exam was normal, with normal cranial nerves, normal deep tendon reflexes, normal sensation, and a normal gait. She had normal tone.   Integument: The skin was normal with no rashes or unusual pigmentation.    Results of laboratory studies collected at this visit and available when this " note was completed:   Component Date Value Ref Range     A-Aminoadipic 09/27/2018 1  0 - 6 umol/dL     Alanine 09/27/2018 27  22 - 62 umol/dL     Anserine 09/27/2018 Negative  umol/dL     Arginine 09/27/2018 8  2 - 18 umol/dL     Asparagine 09/27/2018 4  1 - 5 umol/dL     Aspartic Acid 09/27/2018 1  0 - 4 umol/dL     B-Alanine Plasma 09/27/2018 Negative  umol/dL     B-Aminoisobutyric 09/27/2018 Negative  umol/dL     Carnosine 09/27/2018 Negative  umol/dL     Citrulline 09/27/2018 6.9* 1.3 - 6.0 umol/dL     Cystathionine 09/27/2018 Negative  umol/dL     Cystine 09/27/2018 10  3 - 15 umol/dL     Glutamic Acid 09/27/2018 15  0 - 16 umol/dL     Glutamine 09/27/2018 70  41 - 86 umol/dL     Glycine 09/27/2018 16  13 - 50 umol/dL     Histidine 09/27/2018 8  3 - 15 umol/dL     1-Methylhistidine 09/27/2018 5* 0 - 2 umol/dL     3-Methylhistidine 09/27/2018 1  0 - 1 umol/dL     Homocysteine umol/dL 09/27/2018 Negative  umol/dL     Hydroxylysine 09/27/2018 Negative  umol/dL     Hydroxyproline 09/27/2018 2  0 - 3 umol/dL     Isoleucine 09/27/2018 17* 4 - 11 umol/dL     Leucine 09/27/2018 26* 8 - 21 umol/dL     Lysine 09/27/2018 28* 6 - 26 umol/dL     Methionine 09/27/2018 5  1 - 6 umol/dL     Ornithine 09/27/2018 11  2 - 16 umol/dL     Phenylalanine umol/dL 09/27/2018 7  3 - 10 umol/dL     Proline 09/27/2018 24  0 - 48 umol/dL     Sarcosine Plasma 09/27/2018 Negative  umol/dL     Serine 09/27/2018 9  4 - 18 umol/dL     Taurine 09/27/2018 5* 7 - 32 umol/dL     Threonine 09/27/2018 13  5 - 25 umol/dL     Tyrosine 09/27/2018 12  4 - 13 umol/dL     Valine 09/27/2018 39  8 - 46 umol/dL     Prealbumin 09/27/2018 19  15 - 45 mg/dL     Vitamin D Deficiency screening 09/27/2018 29  20 - 75 ug/L     Carnitine Free 09/27/2018 41  25 - 60 umol/L     CarnitineTotal 09/27/2018 63  34 - 86 umol/L     Carnitine Esterified 09/27/2018 22  5 - 29 umol/L     Carnitine Esterified/Free Ratio 09/27/2018 0.5  0.1 - 1.0     Sodium 09/27/2018 136  133 -  144 mmol/L     Potassium 09/27/2018 3.9  3.4 - 5.3 mmol/L     Chloride 09/27/2018 105  94 - 109 mmol/L     Carbon Dioxide 09/27/2018 23  20 - 32 mmol/L     Anion Gap 09/27/2018 8  3 - 14 mmol/L     Glucose 09/27/2018 91  70 - 99 mg/dL     Urea Nitrogen 09/27/2018 14  7 - 30 mg/dL     Creatinine 09/27/2018 0.71  0.52 - 1.04 mg/dL     GFR Estimate 09/27/2018 >90  >60 mL/min/1.7m2     Calcium 09/27/2018 8.8  8.5 - 10.1 mg/dL     Bilirubin Total 09/27/2018 0.4  0.2 - 1.3 mg/dL     Albumin 09/27/2018 3.4  3.4 - 5.0 g/dL     Protein Total 09/27/2018 7.4  6.8 - 8.8 g/dL     Alkaline Phosphatase 09/27/2018 63  40 - 150 U/L     ALT 09/27/2018 23  0 - 50 U/L     AST 09/27/2018 13  0 - 45 U/L     CHUN RODRIGUEZ M.D.  Professor and Director   Division of Genetics and Metabolism  Department of Pediatrics  [facility]    Routed to patient in Comm Mgt  Also to Alex Lewis

## 2018-09-28 LAB — DEPRECATED CALCIDIOL+CALCIFEROL SERPL-MC: 29 UG/L (ref 20–75)

## 2018-09-29 LAB
ACYLCARNITINE SERPL-SCNC: 22 UMOL/L (ref 5–29)
CARN ESTERS/C0 SERPL-SRTO: 0.5 {RATIO} (ref 0.1–1)
CARNITINE FREE SERPL-SCNC: 41 UMOL/L (ref 25–60)
CARNITINE SERPL-SCNC: 63 UMOL/L (ref 34–86)

## 2018-10-01 LAB
(HCYS)2 SERPL-SCNC: NEGATIVE UMOL/DL
1ME-HIST SERPL-SCNC: 5 UMOL/DL (ref 0–2)
3ME-HISTIDINE SERPL-SCNC: 1 UMOL/DL (ref 0–1)
AAA SERPL-SCNC: 1 UMOL/DL (ref 0–6)
ALANINE SERPL-SCNC: NEGATIVE UMOL/DL
ALANINE SFR SERPL: 27 UMOL/DL (ref 22–62)
ANSERINE SERPL-SCNC: NEGATIVE UMOL/DL
ARGININE SERPL-SCNC: 8 UMOL/DL (ref 2–18)
ASPARAGINE SERPL-SCNC: 4 UMOL/DL (ref 1–5)
ASPARTATE SERPL-SCNC: 1 UMOL/DL (ref 0–4)
B-AIB SERPL-SCNC: NEGATIVE UMOL/DL
CARNOSINE SERPL-SCNC: NEGATIVE UMOL/DL
CITRULLINE SERPL-SCNC: 6.9 UMOL/DL (ref 1.3–6)
CYSTATHIONIN SERPL-SCNC: NEGATIVE UMOL/DL
CYSTINE SERPL-SCNC: 10 UMOL/DL (ref 3–15)
GLUTAMATE SERPL-SCNC: 15 UMOL/DL (ref 0–16)
GLUTAMATE SERPL-SCNC: 70 UMOL/DL (ref 41–86)
GLYCINE SERPL-SCNC: 16 UMOL/DL (ref 13–50)
HISTIDINE SERPL-SCNC: 8 UMOL/DL (ref 3–15)
ISOLEUCINE SERPL-SCNC: 17 UMOL/DL (ref 4–11)
LEUCINE SERPL-SCNC: 26 UMOL/DL (ref 8–21)
LYSINE SERPL-SCNC: 28 UMOL/DL (ref 6–26)
METHIONINE SERPL-SCNC: 5 UMOL/DL (ref 1–6)
OH-LYSINE SERPL-SCNC: NEGATIVE UMOL/DL
OH-PROLINE SERPL-SCNC: 2 UMOL/DL (ref 0–3)
ORNITHINE SERPL-SCNC: 11 UMOL/DL (ref 2–16)
PHE SERPL-SCNC: 7 UMOL/DL (ref 3–10)
PROLINE SERPL-SCNC: 24 UMOL/DL (ref 0–48)
SARCOSINE SERPL-SCNC: NEGATIVE UMOL/DL
SERINE SERPL-SCNC: 9 UMOL/DL (ref 4–18)
TAURINE SERPL-SCNC: 5 UMOL/DL (ref 7–32)
THREONINE SERPL-SCNC: 13 UMOL/DL (ref 5–25)
TYROSINE SERPL-SCNC: 12 UMOL/DL (ref 4–13)
VALINE SERPL-SCNC: 39 UMOL/DL (ref 8–46)

## 2018-10-02 DIAGNOSIS — E72.4 OTC (ORNITHINE TRANSCARBAMYLASE DEFICIENCY) (H): ICD-10-CM

## 2018-10-02 DIAGNOSIS — E71.42 CARNITINE DEFICIENCY DUE TO INBORN ERRORS OF METABOLISM (H): ICD-10-CM

## 2018-10-02 RX ORDER — LEVOCARNITINE 1 G/10ML
500 SOLUTION ORAL 3 TIMES DAILY
Qty: 450 ML | Refills: 11 | Status: SHIPPED | OUTPATIENT
Start: 2018-10-02

## 2018-12-04 ENCOUNTER — TELEPHONE (OUTPATIENT)
Dept: ENDOCRINOLOGY | Facility: CLINIC | Age: 38
End: 2018-12-04

## 2018-12-04 NOTE — TELEPHONE ENCOUNTER
12/04/2018 @ 9:30am-        Returned call to patient's mother (valid consent for discussing patient on file) re: upcoming trip to [place] 12/18-12/21. Patient's mother requesting letter to assist with bringing bulk supplements that are not affixed with prescription, as well as patient's Ravicti. Relayed to patient's mother we would generate statement of medical necessity for flight, as well as, update patient's ED letter. Patient's mother requested letters to be emailed to her. Letters generated on letters tab under Dr. Clayton, her primary metabolic physician. Printed and will be emailed to patient. Additionally working to obtain information regarding best place to seek medical care while in [place], likely [facility]. Will provide that additional information to patient/patient's mother as well in case medical care needed while on their vacation.

## 2019-01-24 ENCOUNTER — TELEPHONE (OUTPATIENT)
Dept: ENDOCRINOLOGY | Facility: CLINIC | Age: 39
End: 2019-01-24

## 2019-01-24 NOTE — TELEPHONE ENCOUNTER
1/24/2019 @ 9:30pm-       Fax received from [employer] indicating patient's Ravicti is not included on formulary and need for annual formulary exception. Placed call to initiate verbal formulary exception/PA. Non-formulary approval for patient's Ravicti was granted and authorized from 1/01/2019-1/24/2020. Case confirmation number: [id number]. Representative stated faxed determination will arrive shortly. Patient, pharmacy and [facility] Patient  updated on approval. No further needs at this time.

## 2019-03-28 ENCOUNTER — OFFICE VISIT (OUTPATIENT)
Dept: ENDOCRINOLOGY | Facility: CLINIC | Age: 39
End: 2019-03-28
Payer: MEDICARE

## 2019-03-28 VITALS
WEIGHT: 246 LBS | BODY MASS INDEX: 39.53 KG/M2 | SYSTOLIC BLOOD PRESSURE: 126 MMHG | DIASTOLIC BLOOD PRESSURE: 86 MMHG | HEART RATE: 80 BPM | HEIGHT: 66 IN

## 2019-03-28 DIAGNOSIS — E55.9 VITAMIN D DEFICIENCY: ICD-10-CM

## 2019-03-28 DIAGNOSIS — E72.4 OTC (ORNITHINE TRANSCARBAMYLASE DEFICIENCY) (H): Primary | ICD-10-CM

## 2019-03-28 DIAGNOSIS — E66.813 CLASS 3 SEVERE OBESITY DUE TO EXCESS CALORIES WITH BODY MASS INDEX (BMI) OF 40.0 TO 44.9 IN ADULT, UNSPECIFIED WHETHER SERIOUS COMORBIDITY PRESENT (H): ICD-10-CM

## 2019-03-28 DIAGNOSIS — E66.01 CLASS 3 SEVERE OBESITY DUE TO EXCESS CALORIES WITH BODY MASS INDEX (BMI) OF 40.0 TO 44.9 IN ADULT, UNSPECIFIED WHETHER SERIOUS COMORBIDITY PRESENT (H): ICD-10-CM

## 2019-03-28 DIAGNOSIS — E72.4 OTC (ORNITHINE TRANSCARBAMYLASE DEFICIENCY) (H): ICD-10-CM

## 2019-03-28 LAB
ALBUMIN SERPL-MCNC: 3.6 G/DL (ref 3.4–5)
ALP SERPL-CCNC: 65 U/L (ref 40–150)
ALT SERPL W P-5'-P-CCNC: 31 U/L (ref 0–50)
ANION GAP SERPL CALCULATED.3IONS-SCNC: 8 MMOL/L (ref 3–14)
AST SERPL W P-5'-P-CCNC: 17 U/L (ref 0–45)
BASOPHILS # BLD AUTO: 0.1 10E9/L (ref 0–0.2)
BASOPHILS NFR BLD AUTO: 0.8 %
BILIRUB SERPL-MCNC: 0.5 MG/DL (ref 0.2–1.3)
BUN SERPL-MCNC: 12 MG/DL (ref 7–30)
CALCIUM SERPL-MCNC: 8.6 MG/DL (ref 8.5–10.1)
CHLORIDE SERPL-SCNC: 108 MMOL/L (ref 94–109)
CO2 SERPL-SCNC: 22 MMOL/L (ref 20–32)
CREAT SERPL-MCNC: 0.65 MG/DL (ref 0.52–1.04)
DIFFERENTIAL METHOD BLD: ABNORMAL
EOSINOPHIL # BLD AUTO: 0.1 10E9/L (ref 0–0.7)
EOSINOPHIL NFR BLD AUTO: 1.1 %
ERYTHROCYTE [DISTWIDTH] IN BLOOD BY AUTOMATED COUNT: 13.2 % (ref 10–15)
GFR SERPL CREATININE-BSD FRML MDRD: >90 ML/MIN/{1.73_M2}
GLUCOSE SERPL-MCNC: 108 MG/DL (ref 70–99)
HCT VFR BLD AUTO: 43.4 % (ref 35–47)
HGB BLD-MCNC: 13.5 G/DL (ref 11.7–15.7)
IMM GRANULOCYTES # BLD: 0 10E9/L (ref 0–0.4)
IMM GRANULOCYTES NFR BLD: 0.3 %
LYMPHOCYTES # BLD AUTO: 2.2 10E9/L (ref 0.8–5.3)
LYMPHOCYTES NFR BLD AUTO: 36.4 %
MCH RBC QN AUTO: 26.9 PG (ref 26.5–33)
MCHC RBC AUTO-ENTMCNC: 31.1 G/DL (ref 31.5–36.5)
MCV RBC AUTO: 87 FL (ref 78–100)
MONOCYTES # BLD AUTO: 0.2 10E9/L (ref 0–1.3)
MONOCYTES NFR BLD AUTO: 3.9 %
NEUTROPHILS # BLD AUTO: 3.5 10E9/L (ref 1.6–8.3)
NEUTROPHILS NFR BLD AUTO: 57.5 %
NRBC # BLD AUTO: 0 10*3/UL
NRBC BLD AUTO-RTO: 0 /100
PLATELET # BLD AUTO: 196 10E9/L (ref 150–450)
POTASSIUM SERPL-SCNC: 4.2 MMOL/L (ref 3.4–5.3)
PREALB SERPL IA-MCNC: 21 MG/DL (ref 15–45)
PROT SERPL-MCNC: 7.3 G/DL (ref 6.8–8.8)
RBC # BLD AUTO: 5.01 10E12/L (ref 3.8–5.2)
SODIUM SERPL-SCNC: 138 MMOL/L (ref 133–144)
WBC # BLD AUTO: 6.1 10E9/L (ref 4–11)

## 2019-03-28 PROCEDURE — 82139 AMINO ACIDS QUAN 6 OR MORE: CPT | Performed by: MEDICAL GENETICS

## 2019-03-28 PROCEDURE — 82306 VITAMIN D 25 HYDROXY: CPT | Performed by: MEDICAL GENETICS

## 2019-03-28 RX ORDER — GLYCOPYRROLATE 0.2 MG/ML
2 INJECTION INTRAMUSCULAR; INTRAVENOUS 3 TIMES DAILY
COMMUNITY

## 2019-03-28 ASSESSMENT — MIFFLIN-ST. JEOR: SCORE: 1809.66

## 2019-03-28 ASSESSMENT — PAIN SCALES - GENERAL: PAINLEVEL: NO PAIN (0)

## 2019-03-28 NOTE — PROGRESS NOTES
Adult Metabolism Clinic Return Visit  Name:  Shira William  :   1980  MRN:   [id number]  Date of Visit: Mar 28, 2019  PCP: Alex Lewis    Managing Metabolic Center(s):  [facility].    Chief Complaint:  Ms. Shira William is a 38 year old female whom I saw in follow up in Metabolism Clinic for OTC deficiency. She also saw our dietitian at this visit.     Assessment:     Shira has been generally stable overall with regard to her OTC deficiency with episodes that suggest elevated ammonia. She is under considerable stress due to the incarceration of her . Her parents remain an important stabilizing influence for her.     Plan:    1. Testing ordered at this visit:   Orders Placed This Encounter   Procedures     DIET CONSULT COMPREHENSIVE    Labs were also drawn prior to the visit, see below.    Results are as noted. Additional recommendations based on these laboratory results:  Glutamine was normal suggesting no chronic hyperammonemia is present.  Several other aminoacids are low consistent with dietary restriction. Hepatic proteins as a measure of protein intake adequacy showed a normal profile of proteins suggesting general protein sufficiency.  Vitamin D was adequate. Shira had a marginal plasma free carnitine value  2. Medications: Continue current medications; Shira should make sure she takes her carnitine supplement each day  3. Metabolic dietician follow up with Jenni Joyner RD, LD at this visit to review special dietary concerns. Metabolic diet should be continued as prescribed.  See nutrition history, below. They discussed:  ---  Nutrition Education:   Discussed and reviewed continuing on low protein diet.     Reviewed weight changes, intake, and most recent labs.  -reviewed food log and offered suggestions for balanced meals including low/moderate portion of high quality protein items at meals  -encouraged increasing  activity level, portion control, and healthy choices with meals  -continue to drink carbonated water in place of pop/sweetened beverages     Patient is living with her parents so mother assists in diet guidance.  Patient has felt stressed the past few months due to social circumstances.    ---  4.   Continue to observe emergency precautions as previously discussed.  Our on-call metabolic service is available 24 hours/day by calling the page  ([phone]) and asking for the Genetics and Metabolism doctor on call.  5. Return to the Adult Metabolism Clinic in 6 months for follow-up.   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 She has not had any episodes that suggest elevated blood ammonia. She reported feeling quite stressed by her 's incarceration which is likely to continue for at least the next year and 1/2.       She reports feeling sufficiently stress that she isn't paying as much attention to what she needs. She is cognizant of the need to block her caloric intake.    Interval History:  Shira was last seen in our clinic on 9/27/18.  Since the last clinic visit Shira was not seen for any urgent clinic visits.  She was not seen in the emergency department.  She currently knows how to contact the 24 hour metabolic specialist on call and has a written emergency letter for this condition.  No hospitalizations occurred.  She had no general anesthesia and no surgical procedures.       Other health services currently received are primary care  Current research study participation: Longitudinal Study of Urea Cycle Disorders  .          Personal History:  Family History Update:   There was no new family history information elicited at this visit     Has an apartment that she was sharing with her  who is now incarcerated. She has a personal care assistant who stops in 2-3 times a week and takes her out for shopping and other errands. 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    Current insurance status state/federal program (Medicaid/Medicare).   Neuropsychological evaluation was not performed since the last clinic visit.      I have reviewed Shira s past medical history, family history, social history, medications and allergies as documented in the patient's electronic medical record.  There were no additional findings except as noted.     Review of Systems:   Constitional: Overweight  Eyes: wears glasses  Ears/Nose/Throat: negative - normal hearing  Respiratory: Occasionally needs albuterol  Cardiovascular: On atenolol  Gastrointestinal: Irritable bowel symptoms  Genitourinary: negative  Musculoskeletal: negative  Endocrine: negative  Hematologic/Lymphatic/Immunologic: negative  Integument: Vitiligo  Neurologic: intellectual disability  Psychiatric: Anxiety and some depression, currently on medications    Nutrition History:  Patient is on a protein-restricted diet of 40-45 g/day.      Patient brings in a food log today.  Typical intake:      Breakfast:   Ex: hard-boiled egg, banana, 4 oz milk  Coffee, toast, grits  Rice Krispies, 1/2 cup milk, toast  Lunch:  Ex: 1 cup hamburger helper with mixed veggies  1 cup noodles with 1 scrambled egg  1 cup fried rice, 1/2 cup broccoli w/chicken, cantaloupe  Dinner: Ex: hot dog  Cheeseburger, Rice-a-adry, salad w/dressing  1 salmon lorraine, salad,  "baked potato  Beverages: tea, coffee, water  Snacks: none logged     Food log likely indicates range of 34-48 grams protein intake.    Medications:  Current Outpatient Medications   Medication Sig Dispense Refill     atenolol (TENORMIN) 50 MG tablet Take 50 mg by mouth daily       calcium-vitamin D (CALCIUM 600 + D) 600-400 MG-UNIT per tablet Take 1 tablet by mouth 2 times daily (with meals). 60 tablet 0     glycerol Phenylbutyrate (RAVICTI) 1.1 GM/ML solution Take 3.4 mLs (3.74 g) by mouth 3 times daily With food 306 mL 11     ibuprofen (ADVIL,MOTRIN) 400 MG tablet Take 400 mg by mouth every 6 hours as needed for moderate pain       l-citrulline POWD Take 1.8 g by mouth 4 times daily 250 g 12     levOCARNitine (CARNITOR) 1 GM/10ML solution Take 5 mLs (500 mg) by mouth 3 times daily 450 mL 11     Multiple Vitamins-Iron (MULTIVITAMIN  /IRON) TABS Take 1 tablet daily.       omeprazole (PRILOSEC) 20 MG capsule Take by mouth daily       ondansetron (ZOFRAN-ODT) 4 MG disintegrating tablet Take by mouth every 12 hours as needed for nausea       Sertraline HCl (ZOLOFT PO) Take 50 mg by mouth daily       Allergies:  Allergies   Allergen Reactions     Compazine [Prochlorperazine]      Erythromycin      Msg [Monosodium Glutamate]       Physical Examination:  Blood pressure 126/86, pulse 80, height 1.664 m (5' 5.5\"), weight 111.6 kg (246 lb), last menstrual period 08/01/2013.  Body surface area is 2.27 meters squared.  General: This was a well-developed, well-nourished female who responded appropriately to all requests during the examination.    Head and Neck:  She had hair of normal texture and distribution and her head was proportionate in appearance.The neck was supple without lymphadenopathy.   Eyes:  The pupils were equal, round, and reacted to light.   The conjunctivae were clear.   Ears:  Her ears were normal in architecture and placement.   Nose: The nose was clear.    Mouth and Throat: her dentition was normal.  The " throat was without erythema.    Respiratory: The chest was clear to auscultation and had a symmetric appearance.    CV:  On examination of the heart, the rhythm was regular and there was no murmur.    GI: The abdomen was soft and had normal bowel sounds.  There was no hepatosplenomegaly.    : I deferred a  examination.   Musculoskeletal: There was a full range of motion on the extremity exam, and normal muscular volume and bulk.   Neurologic: The neurologic exam was normal, with normal cranial nerves, normal deep tendon reflexes, normal sensation, and a normal gait. She had normal tone.   Integument: The skin was normal with no rashes or unusual pigmentation.  Body mass index is 40.31 kg/m .    Results of laboratory studies collected at this visit and available when this note was completed:   Results for orders placed or performed in visit on 03/28/19   Amino acids plasma quantitative   Result Value Ref Range    A-Aminoadipic <1 0 - 6 umol/dL    Alanine 44 22 - 62 umol/dL    Anserine Negative umol/dL    Arginine 9 2 - 18 umol/dL    Asparagine 5 1 - 5 umol/dL    Aspartic Acid <1 0 - 4 umol/dL    B-Alanine Plasma 3 umol/dL    B-Aminoisobutyric Negative umol/dL    Carnosine Negative umol/dL    Citrulline 4.0 1.3 - 6.0 umol/dL    Cystathionine Negative umol/dL    Cystine 10 3 - 15 umol/dL    Glutamic Acid 11 0 - 16 umol/dL    Glutamine 70 41 - 86 umol/dL    Glycine 22 13 - 50 umol/dL    Histidine 9 3 - 15 umol/dL    1-Methylhistidine 6 (H) 0 - 2 umol/dL    3-Methylhistidine <1 0 - 1 umol/dL    Homocysteine umol/dL Negative umol/dL    Hydroxylysine Negative umol/dL    Hydroxyproline 1 0 - 3 umol/dL    Isoleucine 9 4 - 11 umol/dL    Leucine 17 8 - 21 umol/dL    Lysine 30 (H) 6 - 26 umol/dL    Methionine 4 1 - 6 umol/dL    Ornithine 7 2 - 16 umol/dL    Phenylalanine umol/dL 6 3 - 10 umol/dL    Proline 25 0 - 48 umol/dL    Sarcosine Plasma Negative umol/dL    Serine 10 4 - 18 umol/dL    Taurine 6 (L) 7 - 32 umol/dL     Threonine 12 5 - 25 umol/dL    Tyrosine 9 4 - 13 umol/dL    Valine 29 8 - 46 umol/dL   Prealbumin   Result Value Ref Range    Prealbumin 21 15 - 45 mg/dL   Vitamin D Deficiency   Result Value Ref Range    Vitamin D Deficiency screening 25 20 - 75 ug/L   Carnitine free and total   Result Value Ref Range    Carnitine Free 20 (L) 25 - 60 umol/L    Carnitine Total 26 (L) 34 - 86 umol/L    Carnitine Esterified 6 5 - 29 umol/L    Carnitine Esterified/Free Ratio 0.3 0.1 - 1.0   Comprehensive metabolic panel   Result Value Ref Range    Sodium 138 133 - 144 mmol/L    Potassium 4.2 3.4 - 5.3 mmol/L    Chloride 108 94 - 109 mmol/L    Carbon Dioxide 22 20 - 32 mmol/L    Anion Gap 8 3 - 14 mmol/L    Glucose 108 (H) 70 - 99 mg/dL    Urea Nitrogen 12 7 - 30 mg/dL    Creatinine 0.65 0.52 - 1.04 mg/dL    GFR Estimate >90 >60 mL/min/[1.73_m2]    GFR Estimate If Black >90 >60 mL/min/[1.73_m2]    Calcium 8.6 8.5 - 10.1 mg/dL    Bilirubin Total 0.5 0.2 - 1.3 mg/dL    Albumin 3.6 3.4 - 5.0 g/dL    Protein Total 7.3 6.8 - 8.8 g/dL    Alkaline Phosphatase 65 40 - 150 U/L    ALT 31 0 - 50 U/L    AST 17 0 - 45 U/L   CBC with platelets differential   Result Value Ref Range    WBC 6.1 4.0 - 11.0 10e9/L    RBC Count 5.01 3.8 - 5.2 10e12/L    Hemoglobin 13.5 11.7 - 15.7 g/dL    Hematocrit 43.4 35.0 - 47.0 %    MCV 87 78 - 100 fl    MCH 26.9 26.5 - 33.0 pg    MCHC 31.1 (L) 31.5 - 36.5 g/dL    RDW 13.2 10.0 - 15.0 %    Platelet Count 196 150 - 450 10e9/L    Diff Method Automated Method     % Neutrophils 57.5 %    % Lymphocytes 36.4 %    % Monocytes 3.9 %    % Eosinophils 1.1 %    % Basophils 0.8 %    % Immature Granulocytes 0.3 %    Nucleated RBCs 0 0 /100    Absolute Neutrophil 3.5 1.6 - 8.3 10e9/L    Absolute Lymphocytes 2.2 0.8 - 5.3 10e9/L    Absolute Monocytes 0.2 0.0 - 1.3 10e9/L    Absolute Eosinophils 0.1 0.0 - 0.7 10e9/L    Absolute Basophils 0.1 0.0 - 0.2 10e9/L    Abs Immature Granulocytes 0.0 0 - 0.4 10e9/L    Absolute Nucleated RBC  0.0      HCUN RODRIGUEZ M.D.  Professor and Director   Division of Genetics and Metabolism  Department of Pediatrics  [facility]    Routed to patient in Comm [facility]  Also to Alex Lewis

## 2019-03-28 NOTE — LETTER
3/28/2019      RE: Shira William  [address]  [address]  [place] [address]       Adult Metabolism Clinic Return Visit  Name:  Shira William  :   1980  MRN:   [id number]  Date of Visit: Mar 28, 2019  PCP: Alex Lewis    Managing Metabolic Center(s):  [facility].    Chief Complaint:  Ms. Shira William is a 38 year old female whom I saw in follow up in Metabolism Clinic for OTC deficiency. She also saw our dietitian at this visit.     Assessment:     Shira has been generally stable overall with regard to her OTC deficiency with episodes that suggest elevated ammonia. She is under considerable stress due to the incarceration of her . Her parents remain an important stabilizing influence for her.     Plan:    1. Testing ordered at this visit:   Orders Placed This Encounter   Procedures     DIET CONSULT COMPREHENSIVE    Labs were also drawn prior to the visit, see below.    Results are as noted. Additional recommendations based on these laboratory results:  Glutamine was normal suggesting no chronic hyperammonemia is present.  Several other aminoacids are low consistent with dietary restriction. Hepatic proteins as a measure of protein intake adequacy showed a normal profile of proteins suggesting general protein sufficiency.  Vitamin D was adequate. Shira had a marginal plasma free carnitine value  2. Medications: Continue current medications; Shira should make sure she takes her carnitine supplement each day  3. Metabolic dietician follow up with Jenni Joyner RD, LD at this visit to review special dietary concerns. Metabolic diet should be continued as prescribed.  See nutrition history, below. They discussed:  ---  Nutrition Education:   Discussed and reviewed continuing on low protein diet.     Reviewed weight changes, intake, and most recent labs.  -reviewed food log and offered suggestions for balanced meals  including low/moderate portion of high quality protein items at meals  -encouraged increasing activity level, portion control, and healthy choices with meals  -continue to drink carbonated water in place of pop/sweetened beverages     Patient is living with her parents so mother assists in diet guidance.  Patient has felt stressed the past few months due to social circumstances.    ---  4.   Continue to observe emergency precautions as previously discussed.  Our on-call metabolic service is available 24 hours/day by calling the page  ([phone]) and asking for the Genetics and Metabolism doctor on call.  5. Return to the Adult Metabolism Clinic in 6 months for follow-up.   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 She has not had any episodes that suggest elevated blood ammonia. She reported feeling quite stressed by her 's incarceration which is likely to continue for at least the next year and 1/2.       She reports feeling sufficiently stress that she isn't paying as much attention to what she needs. She is cognizant of the need to block her caloric intake.    Interval History:  Shira was last seen in our clinic on 9/27/18.  Since the last clinic visit Shira was not seen for any urgent clinic visits.  She was not seen in the emergency department.  She currently knows how to contact the 24 hour metabolic specialist on call and has a written emergency letter for this  condition.  No hospitalizations occurred.  She had no general anesthesia and no surgical procedures.      Other health services currently received are primary care  Current research study participation: Longitudinal Study of Urea Cycle Disorders  .          Personal History:  Family History Update:   There was no new family history information elicited at this visit     Has an apartment that she was sharing with her  who is now incarcerated. She has a personal care assistant who stops in 2-3 times a week and takes her out for shopping and other errands. 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    Current insurance status state/federal program (Medicaid/Medicare).   Neuropsychological evaluation was not performed since the last clinic visit.      I have reviewed Shira s past medical history, family history, social history, medications and allergies as documented in the patient's electronic medical record.  There were no additional findings except as noted.     Review of Systems:   Constitional: Overweight  Eyes: wears glasses  Ears/Nose/Throat: negative - normal hearing  Respiratory: Occasionally needs albuterol  Cardiovascular: On atenolol  Gastrointestinal: Irritable bowel symptoms  Genitourinary: negative  Musculoskeletal: negative  Endocrine: negative  Hematologic/Lymphatic/Immunologic: negative  Integument: Vitiligo  Neurologic: intellectual disability  Psychiatric: Anxiety and some depression, currently on medications    Nutrition History:  Patient is on a protein-restricted diet of 40-45 g/day.      Patient brings in a food log today.  Typical intake:      Breakfast:   Ex: hard-boiled egg, banana, 4 oz milk  Coffee, toast, grits  Rice Krispies, 1/2 cup milk, toast  Lunch:  Ex: 1 cup hamburger helper with mixed veggies  1 cup noodles with 1 scrambled egg  1 cup fried rice, 1/2 cup broccoli w/chicken,  "cantaloupe  Dinner: Ex: hot dog  Cheeseburger, Rice-a-adry, salad w/dressing  1 salmon lorraine, salad, baked potato  Beverages: tea, coffee, water  Snacks: none logged     Food log likely indicates range of 34-48 grams protein intake.    Medications:  Current Outpatient Medications   Medication Sig Dispense Refill     atenolol (TENORMIN) 50 MG tablet Take 50 mg by mouth daily       calcium-vitamin D (CALCIUM 600 + D) 600-400 MG-UNIT per tablet Take 1 tablet by mouth 2 times daily (with meals). 60 tablet 0     glycerol Phenylbutyrate (RAVICTI) 1.1 GM/ML solution Take 3.4 mLs (3.74 g) by mouth 3 times daily With food 306 mL 11     ibuprofen (ADVIL,MOTRIN) 400 MG tablet Take 400 mg by mouth every 6 hours as needed for moderate pain       l-citrulline POWD Take 1.8 g by mouth 4 times daily 250 g 12     levOCARNitine (CARNITOR) 1 GM/10ML solution Take 5 mLs (500 mg) by mouth 3 times daily 450 mL 11     Multiple Vitamins-Iron (MULTIVITAMIN  /IRON) TABS Take 1 tablet daily.       omeprazole (PRILOSEC) 20 MG capsule Take by mouth daily       ondansetron (ZOFRAN-ODT) 4 MG disintegrating tablet Take by mouth every 12 hours as needed for nausea       Sertraline HCl (ZOLOFT PO) Take 50 mg by mouth daily       Allergies:  Allergies   Allergen Reactions     Compazine [Prochlorperazine]      Erythromycin      Msg [Monosodium Glutamate]       Physical Examination:  Blood pressure 126/86, pulse 80, height 1.664 m (5' 5.5\"), weight 111.6 kg (246 lb), last menstrual period 08/01/2013.  Body surface area is 2.27 meters squared.  General: This was a well-developed, well-nourished female who responded appropriately to all requests during the examination.    Head and Neck:  She had hair of normal texture and distribution and her head was proportionate in appearance.The neck was supple without lymphadenopathy.   Eyes:  The pupils were equal, round, and reacted to light.   The conjunctivae were clear.   Ears:  Her ears were normal in " architecture and placement.   Nose: The nose was clear.    Mouth and Throat: her dentition was normal.  The throat was without erythema.    Respiratory: The chest was clear to auscultation and had a symmetric appearance.    CV:  On examination of the heart, the rhythm was regular and there was no murmur.    GI: The abdomen was soft and had normal bowel sounds.  There was no hepatosplenomegaly.    : I deferred a  examination.   Musculoskeletal: There was a full range of motion on the extremity exam, and normal muscular volume and bulk.   Neurologic: The neurologic exam was normal, with normal cranial nerves, normal deep tendon reflexes, normal sensation, and a normal gait. She had normal tone.   Integument: The skin was normal with no rashes or unusual pigmentation.  Body mass index is 40.31 kg/m .    Results of laboratory studies collected at this visit and available when this note was completed:   Results for orders placed or performed in visit on 03/28/19   Amino acids plasma quantitative   Result Value Ref Range    A-Aminoadipic <1 0 - 6 umol/dL    Alanine 44 22 - 62 umol/dL    Anserine Negative umol/dL    Arginine 9 2 - 18 umol/dL    Asparagine 5 1 - 5 umol/dL    Aspartic Acid <1 0 - 4 umol/dL    B-Alanine Plasma 3 umol/dL    B-Aminoisobutyric Negative umol/dL    Carnosine Negative umol/dL    Citrulline 4.0 1.3 - 6.0 umol/dL    Cystathionine Negative umol/dL    Cystine 10 3 - 15 umol/dL    Glutamic Acid 11 0 - 16 umol/dL    Glutamine 70 41 - 86 umol/dL    Glycine 22 13 - 50 umol/dL    Histidine 9 3 - 15 umol/dL    1-Methylhistidine 6 (H) 0 - 2 umol/dL    3-Methylhistidine <1 0 - 1 umol/dL    Homocysteine umol/dL Negative umol/dL    Hydroxylysine Negative umol/dL    Hydroxyproline 1 0 - 3 umol/dL    Isoleucine 9 4 - 11 umol/dL    Leucine 17 8 - 21 umol/dL    Lysine 30 (H) 6 - 26 umol/dL    Methionine 4 1 - 6 umol/dL    Ornithine 7 2 - 16 umol/dL    Phenylalanine umol/dL 6 3 - 10 umol/dL    Proline 25 0 - 48  umol/dL    Sarcosine Plasma Negative umol/dL    Serine 10 4 - 18 umol/dL    Taurine 6 (L) 7 - 32 umol/dL    Threonine 12 5 - 25 umol/dL    Tyrosine 9 4 - 13 umol/dL    Valine 29 8 - 46 umol/dL   Prealbumin   Result Value Ref Range    Prealbumin 21 15 - 45 mg/dL   Vitamin D Deficiency   Result Value Ref Range    Vitamin D Deficiency screening 25 20 - 75 ug/L   Carnitine free and total   Result Value Ref Range    Carnitine Free 20 (L) 25 - 60 umol/L    Carnitine Total 26 (L) 34 - 86 umol/L    Carnitine Esterified 6 5 - 29 umol/L    Carnitine Esterified/Free Ratio 0.3 0.1 - 1.0   Comprehensive metabolic panel   Result Value Ref Range    Sodium 138 133 - 144 mmol/L    Potassium 4.2 3.4 - 5.3 mmol/L    Chloride 108 94 - 109 mmol/L    Carbon Dioxide 22 20 - 32 mmol/L    Anion Gap 8 3 - 14 mmol/L    Glucose 108 (H) 70 - 99 mg/dL    Urea Nitrogen 12 7 - 30 mg/dL    Creatinine 0.65 0.52 - 1.04 mg/dL    GFR Estimate >90 >60 mL/min/[1.73_m2]    GFR Estimate If Black >90 >60 mL/min/[1.73_m2]    Calcium 8.6 8.5 - 10.1 mg/dL    Bilirubin Total 0.5 0.2 - 1.3 mg/dL    Albumin 3.6 3.4 - 5.0 g/dL    Protein Total 7.3 6.8 - 8.8 g/dL    Alkaline Phosphatase 65 40 - 150 U/L    ALT 31 0 - 50 U/L    AST 17 0 - 45 U/L   CBC with platelets differential   Result Value Ref Range    WBC 6.1 4.0 - 11.0 10e9/L    RBC Count 5.01 3.8 - 5.2 10e12/L    Hemoglobin 13.5 11.7 - 15.7 g/dL    Hematocrit 43.4 35.0 - 47.0 %    MCV 87 78 - 100 fl    MCH 26.9 26.5 - 33.0 pg    MCHC 31.1 (L) 31.5 - 36.5 g/dL    RDW 13.2 10.0 - 15.0 %    Platelet Count 196 150 - 450 10e9/L    Diff Method Automated Method     % Neutrophils 57.5 %    % Lymphocytes 36.4 %    % Monocytes 3.9 %    % Eosinophils 1.1 %    % Basophils 0.8 %    % Immature Granulocytes 0.3 %    Nucleated RBCs 0 0 /100    Absolute Neutrophil 3.5 1.6 - 8.3 10e9/L    Absolute Lymphocytes 2.2 0.8 - 5.3 10e9/L    Absolute Monocytes 0.2 0.0 - 1.3 10e9/L    Absolute Eosinophils 0.1 0.0 - 0.7 10e9/L    Absolute  Basophils 0.1 0.0 - 0.2 10e9/L    Abs Immature Granulocytes 0.0 0 - 0.4 10e9/L    Absolute Nucleated RBC 0.0      CHUN RODRIGUEZ M.D.  Professor and Director   Division of Genetics and Metabolism  Department of Pediatrics  [facility]    Routed to patient in Comm Mgt  Also to Alex Lewis

## 2019-03-28 NOTE — PATIENT INSTRUCTIONS
Metabolism Clinic    Maintain metabolic diet and medications.  Call if any general care questions arise - contact our nurse coordinator Sharyn Mattson at [phone].      Contact the metabolic doctor on-call if any immediate need because of illness - [phone]

## 2019-03-28 NOTE — Clinical Note
3/28/2019       RE: Shira William  [address]  [address]  [place] [address]     Dear Colleague,    Thank you for referring your patient, Shira William, to the [facility] METABOLIC DISORDERS at [facility]. Please see a copy of my visit note below.    Adult Metabolism Clinic Return Visit  Name:  Shira William  :   1980  MRN:   [id number]  Date of Visit: Mar 28, 2019  PCP: Alex Lewis    Immunization status is: {IMMUNIZATION STATUS:788802598}.  Managing Metabolic Center(s):  [facility] Adult Metabolism [facility].    Chief Complaint:  Ms. Shira William is a 38 year old female whom I saw in follow up in Metabolism Clinic for ***.  Ms. William was accompanied to this visit by her  {FAMILY MEMBERS SHORT:855144}. She also saw our {OTHER PROVIDERS:816151002} at this visit.     Assessment:    ***     Plan:    1. Testing ordered at this visit:   No orders of the defined types were placed in this encounter.  .    Results are as noted, below. Additional recommendations based on these laboratory results:  ***  2. Medications: Continue ***   3. Metabolic dietician follow up with Jenni Joyner RD, LD at this visit to review special dietary concerns. Metabolic diet should be continued as prescribed.  See nutrition history, below. They discussed:  ---  ***  ---  4.   Genetic counseling with {METABOLIC PEDIATRIC GEN COUNSELORS:[id number]} to review ***.  5. Continue to observe emergency precautions as previously discussed.  Our on-call metabolic service is available 24 hours/day by calling the page  ([phone]) and asking for the Genetics and Metabolism doctor on call.  6. Return to the Adult Metabolism Clinic in *** {WEEKS OR MONTHS:297964} for follow-up.     7.  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      Interval History:  Shira was last seen in our clinic on 9/27/18.  Since the last clinic visit Shira was not seen for any urgent clinic visits.  She was not seen in the emergency department.  She currently {EMERGENCY:563061024}.  No hospitalizations occurred.  She had no general anesthesia and no surgical procedures.      Other health services currently received are primary care***, {OTHER HEALTH SERVICES:217710059}  Current research study participation:  ***.             Past Medical History:  No past medical history on file.    Personal History:  Family History Update:  *** There was no new family history information elicited at this visit  Family History   Problem Relation Age of Onset     Genetic Disorder Mother         OTC deficiency     Genetic Disorder Brother         OTC deficiency     Lives with {Household:954054}  Stressors for patient and family: ***  Community resources received currently are {COMMUNITY RESOURCES:[id number]}.  Current insurance status {CURRENT INSURANCE STATUS:[id number]}.   Neuropsychological evaluation {NEUROPSYCHOLOGICAL EVALUATION:855984930}.    Behavioral concerns: {BEHAVIORAL CONCERNS:726287353}.    Special education {SPECIAL EDUCATION:468813787} services currently received include {SPECIAL EDUCATION CLASSIFICATION:778984181}.    Education/Vocation: {SCHOOL/WORK CIRCUMSTANCES:311151721}      I have reviewed Shira s past medical history, family history, social history, medications and allergies as documented in the patient's electronic medical record.  There were no additional findings except as noted.     Review of  "Systems: ***  Constitional: Overweight  Eyes: wears glasses  Ears/Nose/Throat: negative - normal hearing  Respiratory: Occasionally needs albuterol  Cardiovascular: On atenolol  Gastrointestinal: Irritable bowel symptoms  Genitourinary: negative  Musculoskeletal: negative  Endocrine: negative  Hematologic/Lymphatic/Immunologic: negative  Integument: Vitiligo  Neurologic: intellectual disability  Psychiatric: Anxiety and some depression, currently on medications    Nutrition History:  ***    Medications:  Current Outpatient Medications   Medication Sig Dispense Refill     atenolol (TENORMIN) 50 MG tablet Take 50 mg by mouth daily       calcium-vitamin D (CALCIUM 600 + D) 600-400 MG-UNIT per tablet Take 1 tablet by mouth 2 times daily (with meals). 60 tablet 0     glycerol Phenylbutyrate (RAVICTI) 1.1 GM/ML solution Take 3.4 mLs (3.74 g) by mouth 3 times daily With food 306 mL 11     ibuprofen (ADVIL,MOTRIN) 400 MG tablet Take 400 mg by mouth every 6 hours as needed for moderate pain       l-citrulline POWD Take 1.8 g by mouth 4 times daily 250 g 12     levOCARNitine (CARNITOR) 1 GM/10ML solution Take 5 mLs (500 mg) by mouth 3 times daily 450 mL 11     Multiple Vitamins-Iron (MULTIVITAMIN  /IRON) TABS Take 1 tablet daily.       omeprazole (PRILOSEC) 20 MG capsule Take by mouth daily       ondansetron (ZOFRAN-ODT) 4 MG disintegrating tablet Take by mouth every 12 hours as needed for nausea       Sertraline HCl (ZOLOFT PO) Take 50 mg by mouth daily       Allergies:  Allergies   Allergen Reactions     Compazine [Prochlorperazine]      Erythromycin      Msg [Monosodium Glutamate]       Physical Examination:  Blood pressure 126/86, pulse 80, height 1.664 m (5' 5.5\"), weight 111.6 kg (246 lb), last menstrual period 08/01/2013.  Body surface area is 2.27 meters squared.  General: This was a well-developed, well-nourished female who responded appropriately to all requests during the examination.    Head and Neck:  She had " hair of normal texture and distribution and her head was proportionate in appearance.The neck was supple without lymphadenopathy.   Eyes:  The pupils were equal, round, and reacted to light.   The conjunctivae were clear.   Ears:  Her ears were normal in architecture and placement.   Nose: The nose was clear.    Mouth and Throat: her dentition was normal.  The throat was without erythema.    Respiratory: The chest was clear to auscultation and had a symmetric appearance.    CV:  On examination of the heart, the rhythm was regular and there was no murmur.    GI: The abdomen was soft and had normal bowel sounds.  There was no hepatosplenomegaly.    : I deferred a  examination.   Musculoskeletal: There was a full range of motion on the extremity exam, and normal muscular volume and bulk.   Neurologic: The neurologic exam was normal, with normal cranial nerves, normal deep tendon reflexes, normal sensation, and a normal gait. She had normal tone.   Integument: The skin was normal with no rashes or unusual pigmentation.  Body mass index is 40.31 kg/m .    Results of laboratory studies collected at this visit and available when this note was completed:   Results for orders placed or performed in visit on 09/27/18   Amino acids plasma quantitative   Result Value Ref Range    A-Aminoadipic 1 0 - 6 umol/dL    Alanine 27 22 - 62 umol/dL    Anserine Negative umol/dL    Arginine 8 2 - 18 umol/dL    Asparagine 4 1 - 5 umol/dL    Aspartic Acid 1 0 - 4 umol/dL    B-Alanine Plasma Negative umol/dL    B-Aminoisobutyric Negative umol/dL    Carnosine Negative umol/dL    Citrulline 6.9 (H) 1.3 - 6.0 umol/dL    Cystathionine Negative umol/dL    Cystine 10 3 - 15 umol/dL    Glutamic Acid 15 0 - 16 umol/dL    Glutamine 70 41 - 86 umol/dL    Glycine 16 13 - 50 umol/dL    Histidine 8 3 - 15 umol/dL    1-Methylhistidine 5 (H) 0 - 2 umol/dL    3-Methylhistidine 1 0 - 1 umol/dL    Homocysteine umol/dL Negative umol/dL    Hydroxylysine  Negative umol/dL    Hydroxyproline 2 0 - 3 umol/dL    Isoleucine 17 (H) 4 - 11 umol/dL    Leucine 26 (H) 8 - 21 umol/dL    Lysine 28 (H) 6 - 26 umol/dL    Methionine 5 1 - 6 umol/dL    Ornithine 11 2 - 16 umol/dL    Phenylalanine umol/dL 7 3 - 10 umol/dL    Proline 24 0 - 48 umol/dL    Sarcosine Plasma Negative umol/dL    Serine 9 4 - 18 umol/dL    Taurine 5 (L) 7 - 32 umol/dL    Threonine 13 5 - 25 umol/dL    Tyrosine 12 4 - 13 umol/dL    Valine 39 8 - 46 umol/dL   Prealbumin   Result Value Ref Range    Prealbumin 19 15 - 45 mg/dL   Vitamin D Deficiency   Result Value Ref Range    Vitamin D Deficiency screening 29 20 - 75 ug/L   Carnitine free and total   Result Value Ref Range    Carnitine Free 41 25 - 60 umol/L    Carnitine Total 63 34 - 86 umol/L    Carnitine Esterified 22 5 - 29 umol/L    Carnitine Esterified/Free Ratio 0.5 0.1 - 1.0   Comprehensive metabolic panel   Result Value Ref Range    Sodium 136 133 - 144 mmol/L    Potassium 3.9 3.4 - 5.3 mmol/L    Chloride 105 94 - 109 mmol/L    Carbon Dioxide 23 20 - 32 mmol/L    Anion Gap 8 3 - 14 mmol/L    Glucose 91 70 - 99 mg/dL    Urea Nitrogen 14 7 - 30 mg/dL    Creatinine 0.71 0.52 - 1.04 mg/dL    GFR Estimate >90 >60 mL/min/1.7m2    GFR Estimate If Black >90 >60 mL/min/1.7m2    Calcium 8.8 8.5 - 10.1 mg/dL    Bilirubin Total 0.4 0.2 - 1.3 mg/dL    Albumin 3.4 3.4 - 5.0 g/dL    Protein Total 7.4 6.8 - 8.8 g/dL    Alkaline Phosphatase 63 40 - 150 U/L    ALT 23 0 - 50 U/L    AST 13 0 - 45 U/L     TOYA RODRIGUEZ M.D.  Professor and Director   Division of Genetics and Metabolism  Department of Pediatrics  [facility]    Routed to patient in Comm Mgt  Also to Alex Lewis ***      Again, thank you for allowing me to participate in the care of your patient.      Sincerely,    Toya Rodriguez MD

## 2019-03-29 LAB — DEPRECATED CALCIDIOL+CALCIFEROL SERPL-MC: 25 UG/L (ref 20–75)

## 2019-04-01 LAB
ACYLCARNITINE SERPL-SCNC: 6 UMOL/L (ref 5–29)
CARN ESTERS/C0 SERPL-SRTO: 0.3 {RATIO} (ref 0.1–1)
CARNITINE FREE SERPL-SCNC: 20 UMOL/L (ref 25–60)
CARNITINE SERPL-SCNC: 26 UMOL/L (ref 34–86)

## 2019-04-02 LAB
(HCYS)2 SERPL-SCNC: NEGATIVE UMOL/DL
1ME-HIST SERPL-SCNC: 6 UMOL/DL (ref 0–2)
3ME-HISTIDINE SERPL-SCNC: <1 UMOL/DL (ref 0–1)
AAA SERPL-SCNC: <1 UMOL/DL (ref 0–6)
ALANINE SERPL-SCNC: 3 UMOL/DL
ALANINE SFR SERPL: 44 UMOL/DL (ref 22–62)
ANSERINE SERPL-SCNC: NEGATIVE UMOL/DL
ARGININE SERPL-SCNC: 9 UMOL/DL (ref 2–18)
ASPARAGINE SERPL-SCNC: 5 UMOL/DL (ref 1–5)
ASPARTATE SERPL-SCNC: <1 UMOL/DL (ref 0–4)
B-AIB SERPL-SCNC: NEGATIVE UMOL/DL
CARNOSINE SERPL-SCNC: NEGATIVE UMOL/DL
CITRULLINE SERPL-SCNC: 4 UMOL/DL (ref 1.3–6)
CYSTATHIONIN SERPL-SCNC: NEGATIVE UMOL/DL
CYSTINE SERPL-SCNC: 10 UMOL/DL (ref 3–15)
GLUTAMATE SERPL-SCNC: 11 UMOL/DL (ref 0–16)
GLUTAMATE SERPL-SCNC: 70 UMOL/DL (ref 41–86)
GLYCINE SERPL-SCNC: 22 UMOL/DL (ref 13–50)
HISTIDINE SERPL-SCNC: 9 UMOL/DL (ref 3–15)
ISOLEUCINE SERPL-SCNC: 9 UMOL/DL (ref 4–11)
LEUCINE SERPL-SCNC: 17 UMOL/DL (ref 8–21)
LYSINE SERPL-SCNC: 30 UMOL/DL (ref 6–26)
METHIONINE SERPL-SCNC: 4 UMOL/DL (ref 1–6)
OH-LYSINE SERPL-SCNC: NEGATIVE UMOL/DL
OH-PROLINE SERPL-SCNC: 1 UMOL/DL (ref 0–3)
ORNITHINE SERPL-SCNC: 7 UMOL/DL (ref 2–16)
PHE SERPL-SCNC: 6 UMOL/DL (ref 3–10)
PROLINE SERPL-SCNC: 25 UMOL/DL (ref 0–48)
SARCOSINE SERPL-SCNC: NEGATIVE UMOL/DL
SERINE SERPL-SCNC: 10 UMOL/DL (ref 4–18)
TAURINE SERPL-SCNC: 6 UMOL/DL (ref 7–32)
THREONINE SERPL-SCNC: 12 UMOL/DL (ref 5–25)
TYROSINE SERPL-SCNC: 9 UMOL/DL (ref 4–13)
VALINE SERPL-SCNC: 29 UMOL/DL (ref 8–46)

## 2019-08-01 NOTE — NURSING NOTE
Chief Complaint   Patient presents with     RECHECK     follow up      Casandra Daniels CMA  
joselo naylor dtr

## 2019-09-20 ENCOUNTER — TELEPHONE (OUTPATIENT)
Dept: CONSULT | Facility: CLINIC | Age: 39
End: 2019-09-20

## 2019-09-20 DIAGNOSIS — E72.4 OTC (ORNITHINE TRANSCARBAMYLASE DEFICIENCY) (H): ICD-10-CM

## 2019-09-25 DIAGNOSIS — E72.4 OTC (ORNITHINE TRANSCARBAMYLASE DEFICIENCY) (H): Primary | ICD-10-CM

## 2019-09-26 ENCOUNTER — ALLIED HEALTH/NURSE VISIT (OUTPATIENT)
Dept: ENDOCRINOLOGY | Facility: CLINIC | Age: 39
End: 2019-09-26
Payer: MEDICARE

## 2019-09-26 ENCOUNTER — OFFICE VISIT (OUTPATIENT)
Dept: ENDOCRINOLOGY | Facility: CLINIC | Age: 39
End: 2019-09-26
Payer: MEDICARE

## 2019-09-26 VITALS
BODY MASS INDEX: 40.65 KG/M2 | SYSTOLIC BLOOD PRESSURE: 112 MMHG | HEART RATE: 78 BPM | WEIGHT: 244 LBS | HEIGHT: 65 IN | DIASTOLIC BLOOD PRESSURE: 77 MMHG

## 2019-09-26 DIAGNOSIS — E72.4 OTC (ORNITHINE TRANSCARBAMYLASE DEFICIENCY) (H): Primary | ICD-10-CM

## 2019-09-26 DIAGNOSIS — E72.4 OTC (ORNITHINE TRANSCARBAMYLASE DEFICIENCY) (H): ICD-10-CM

## 2019-09-26 LAB
ALBUMIN SERPL-MCNC: 3.5 G/DL (ref 3.4–5)
ALP SERPL-CCNC: 61 U/L (ref 40–150)
ALT SERPL W P-5'-P-CCNC: 30 U/L (ref 0–50)
ANION GAP SERPL CALCULATED.3IONS-SCNC: 8 MMOL/L (ref 3–14)
AST SERPL W P-5'-P-CCNC: 15 U/L (ref 0–45)
BASOPHILS # BLD AUTO: 0 10E9/L (ref 0–0.2)
BASOPHILS NFR BLD AUTO: 0.8 %
BILIRUB SERPL-MCNC: 0.5 MG/DL (ref 0.2–1.3)
BUN SERPL-MCNC: 12 MG/DL (ref 7–30)
CALCIUM SERPL-MCNC: 8.5 MG/DL (ref 8.5–10.1)
CHLORIDE SERPL-SCNC: 108 MMOL/L (ref 94–109)
CO2 SERPL-SCNC: 23 MMOL/L (ref 20–32)
CREAT SERPL-MCNC: 0.67 MG/DL (ref 0.52–1.04)
DIFFERENTIAL METHOD BLD: NORMAL
EOSINOPHIL # BLD AUTO: 0 10E9/L (ref 0–0.7)
EOSINOPHIL NFR BLD AUTO: 0.8 %
ERYTHROCYTE [DISTWIDTH] IN BLOOD BY AUTOMATED COUNT: 13.3 % (ref 10–15)
GFR SERPL CREATININE-BSD FRML MDRD: >90 ML/MIN/{1.73_M2}
GLUCOSE SERPL-MCNC: 125 MG/DL (ref 70–99)
HCT VFR BLD AUTO: 42.4 % (ref 35–47)
HGB BLD-MCNC: 13.6 G/DL (ref 11.7–15.7)
IMM GRANULOCYTES # BLD: 0 10E9/L (ref 0–0.4)
IMM GRANULOCYTES NFR BLD: 0.2 %
LYMPHOCYTES # BLD AUTO: 1.6 10E9/L (ref 0.8–5.3)
LYMPHOCYTES NFR BLD AUTO: 32.4 %
MCH RBC QN AUTO: 27.7 PG (ref 26.5–33)
MCHC RBC AUTO-ENTMCNC: 32.1 G/DL (ref 31.5–36.5)
MCV RBC AUTO: 86 FL (ref 78–100)
MONOCYTES # BLD AUTO: 0.2 10E9/L (ref 0–1.3)
MONOCYTES NFR BLD AUTO: 3 %
NEUTROPHILS # BLD AUTO: 3.1 10E9/L (ref 1.6–8.3)
NEUTROPHILS NFR BLD AUTO: 62.8 %
NRBC # BLD AUTO: 0 10*3/UL
NRBC BLD AUTO-RTO: 0 /100
PLATELET # BLD AUTO: 204 10E9/L (ref 150–450)
POTASSIUM SERPL-SCNC: 4.2 MMOL/L (ref 3.4–5.3)
PREALB SERPL IA-MCNC: 19 MG/DL (ref 15–45)
PROT SERPL-MCNC: 7.2 G/DL (ref 6.8–8.8)
RBC # BLD AUTO: 4.91 10E12/L (ref 3.8–5.2)
SODIUM SERPL-SCNC: 138 MMOL/L (ref 133–144)
WBC # BLD AUTO: 4.9 10E9/L (ref 4–11)

## 2019-09-26 PROCEDURE — 82139 AMINO ACIDS QUAN 6 OR MORE: CPT | Performed by: MEDICAL GENETICS

## 2019-09-26 ASSESSMENT — MIFFLIN-ST. JEOR: SCORE: 1792.66

## 2019-09-26 ASSESSMENT — PAIN SCALES - GENERAL: PAINLEVEL: NO PAIN (0)

## 2019-09-26 NOTE — PATIENT INSTRUCTIONS
Metabolism Clinic    Maintain metabolic diet and medications.  Call if any general care questions arise - contact our nurse coordinator Sharyn Mattson at [phone].      Contact the metabolic doctor on-call if any immediate need because of illness - [phone]

## 2019-09-26 NOTE — Clinical Note
2019       RE: Shira William  [address]  [address]  [place] [address]     Dear Colleague,    Thank you for referring your patient, Shira William, to the [facility] METABOLIC [facility]ORDERS at [facility]. Please see a copy of my visit note below.    Adult Metabolism Clinic Return Visit  Name:  Shira William  :   1980  MRN:   [id number]  Date of Visit: Sep 26, 2019  PCP: Alex Lewis    Managing Metabolic Center(s):  [facility].    Chief Complaint:  Ms. Shira William is a 38 year old female whom I saw in follow up in Metabolism Clinic for OTC (ornithine transcarbamylase deficiency) (H).  Ms. William was accompanied to this visit by her  no others. She also saw our dietitian, nurse and nurse practitioner at this visit.     Assessment:    Shira has been stable overall with regard to her OTC deficiency without any recent episodes that suggest elevated ammonia. The most recent was greater than 1 year ago. She has recently seen rheumatology for her chronic knee pain and had a workup that showed a minor elevation of LEO. The specific cause of her joint pain is not clear but her rheumatologist suggests that her pain could benefit from methotrexate in addition to her q3 month steroid injections. Shira is willing to go without this for now but if she would like to take methotrexate please reach out and we would be happy to help manage this medication in the setting of her underlying OTC. Shira otherwise continues to work on loosing weight, and isn't having problems maintaining protein intake. Her greatest challenge at this time is that her  remains incarcerated and she is living with her parents. This is at times especially stressful for her.      Plan:    1. Testing ordered at this visit:   Orders Placed This Encounter   Procedures     DIET CONSULT COMPREHENSIVE   .    Results are as  noted, below. Additional recommendations based on these laboratory results:  ***  2. Medications: Continue current medications, please reach out if methotrexate is needed  3. Metabolic dietician follow up with Jenni Joyner RD, LD at this visit to review special dietary concerns. Metabolic diet should be continued as prescribed.  See nutrition history, below. They discussed:  ---  ***  ---  4. Continue to observe emergency precautions as previously discussed.  Our on-call metabolic service is available 24 hours/day by calling the page  ([phone]) and asking for the Genetics and Metabolism doctor on call.  5. Return to the Adult Metabolism Clinic in 6 months for follow-up.     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 She has not had any episodes that suggest elevated blood ammonia. She reported feeling stressed by her 's incarceration an living with her parents. Her father's health has also been fluctuating, which is a stressor for Shira.  She has been having bilateral knee pain for which she is seeing rheumatology. They recommend that methotrexate may be helpful. Steroid injections seem to help as well but she in not due for one until November.     Interval History:  Shira was last seen in our clinic on 03/28/2019.  Since the last clinic visit Shira was not seen for any urgent clinic visits.  She was not seen in the emergency department.  She currently knows how to contact the  24 hour metabolic specialist on call and has a written emergency letter for this condition.  No hospitalizations occurred.  She had no general anesthesia and no surgical procedures.      Other health services currently received are primary care, dietitian  Current research study participation:  Longitudinal Study of Urea Cycle Disorders.             Past Medical History:  No past medical history on file.    Personal History:  Family History Update:  There was no new family history information elicited at this visit  Family History   Problem Relation Age of Onset     Genetic Disorder Mother         OTC deficiency     Genetic Disorder Brother         OTC deficiency   .    Lives with father  mother  Community resources received currently are none.  Current insurance status state/federal program (Medicaid/Medicare).     Neuropsychological evaluation was not performed since the last clinic visit.    Behavioral concerns: none reported.     Education/Vocation: working      I have reviewed Shira s past medical history, family history, social history, medications and allergies as documented in the patient's electronic medical record.  There were no additional findings except as noted.     Review of Systems:  Constitional: negative  Eyes: negative - normal vision with corrective lenses  Ears/Nose/Throat: negative - normal hearing  Respiratory: negative  Cardiovascular: On atenolol  Gastrointestinal: IBS, takes glycopyrolate which is helpful  Genitourinary: negative  Hematologic/Lymphatic: negative  Allergy/Immunologic: negative - no drug allergies  Musculoskeletal: See HPI for details on her knee pain.  Endocrine: negative  Integument: negative  Neurologic: intellectual disability  Psychiatric: negative    Nutrition History: ***  Patient is on a protein-restricted diet of 40-45 g/day.      Patient brings in a food log today.  Typical intake:      Breakfast:   Ex: hard-boiled egg, banana, 4 oz milk  Coffee, toast,  "grits  Rice Krispies, 1/2 cup milk, toast  Lunch:  Ex: 1 cup hamburger helper with mixed veggies  1 cup noodles with 1 scrambled egg  1 cup fried rice, 1/2 cup broccoli w/chicken, cantaloupe  Dinner: Ex: hot dog  Cheeseburger, Rice-a-adry, salad w/dressing  1 salmon lorraine, salad, baked potato  Beverages: tea, coffee, water  Snacks: none logged     Food log likely indicates range of 34-48 grams protein intake.    Medications:  Current Outpatient Medications   Medication Sig Dispense Refill     atenolol (TENORMIN) 50 MG tablet Take 50 mg by mouth daily       calcium-vitamin D (CALCIUM 600 + D) 600-400 MG-UNIT per tablet Take 1 tablet by mouth 2 times daily (with meals). 60 tablet 0     glycerol Phenylbutyrate (RAVICTI) 1.1 GM/ML solution Take 3.4 mLs (3.74 g) by mouth 3 times daily with food 306 mL 11     glycopyrrolate (ROBINUL) 0.4 MG/2ML injection Inject 2 mg into the vein 3 times daily       ibuprofen (ADVIL,MOTRIN) 400 MG tablet Take 400 mg by mouth every 6 hours as needed for moderate pain       l-citrulline POWD Take 1.8 g by mouth 4 times daily 250 g 12     levOCARNitine (CARNITOR) 1 GM/10ML solution Take 5 mLs (500 mg) by mouth 3 times daily 450 mL 11     Multiple Vitamins-Iron (MULTIVITAMIN  /IRON) TABS Take 1 tablet daily.       omeprazole (PRILOSEC) 20 MG capsule Take by mouth daily       ondansetron (ZOFRAN-ODT) 4 MG disintegrating tablet Take by mouth every 12 hours as needed for nausea       Sertraline HCl (ZOLOFT PO) Take 50 mg by mouth daily          Allergies:  Allergies   Allergen Reactions     Compazine [Prochlorperazine]      Erythromycin      Msg [Monosodium Glutamate]         Physical Examination:  Blood pressure 112/77, pulse 78, height 5' 5\" (165.1 cm), weight 244 lb (110.7 kg), last menstrual period 08/01/2013.  Body surface area is 2.25 meters squared.  BMI: Body mass index is 40.6 kg/m .  General: This was a well-developed, well-nourished female who responded appropriately to all requests " during the examination.    Head and Neck:  She had hair of normal texture and distribution and her head was proportionate in appearance.The neck was supple without lymphadenopathy.    Eyes:  The pupils were equal, round, and reacted to light.   The conjunctivae were clear.   Ears:  Her ears were normal in architecture and placement.   Nose: The nose was clear.    Mouth and Throat: her dentition was normal.  The throat was without erythema.    Respiratory: The chest was clear to auscultation and had a symmetric appearance.    CV:  On examination of the heart, the rhythm was regular and there was no murmur.  The peripheral pulses were normal.    GI: The abdomen was soft and had normal bowel sounds.  There was no hepatosplenomegaly.    : I deferred a  examination.   Musculoskeletal: There was a full range of motion on the extremity exam, and normal muscular volume and bulk.   Neurologic: The neurologic exam was normal, normal deep tendon reflexes, strength, and a normal gait. She had normal tone.   Integument: The skin was normal with no rashes or unusual pigmentation.    Results of laboratory studies collected at this visit and available when this note was completed:   Results for orders placed or performed in visit on 09/26/19   CBC with platelets differential   Result Value Ref Range    WBC 4.9 4.0 - 11.0 10e9/L    RBC Count 4.91 3.8 - 5.2 10e12/L    Hemoglobin 13.6 11.7 - 15.7 g/dL    Hematocrit 42.4 35.0 - 47.0 %    MCV 86 78 - 100 fl    MCH 27.7 26.5 - 33.0 pg    MCHC 32.1 31.5 - 36.5 g/dL    RDW 13.3 10.0 - 15.0 %    Platelet Count 204 150 - 450 10e9/L    Diff Method Automated Method     % Neutrophils 62.8 %    % Lymphocytes 32.4 %    % Monocytes 3.0 %    % Eosinophils 0.8 %    % Basophils 0.8 %    % Immature Granulocytes 0.2 %    Nucleated RBCs 0 0 /100    Absolute Neutrophil 3.1 1.6 - 8.3 10e9/L    Absolute Lymphocytes 1.6 0.8 - 5.3 10e9/L    Absolute Monocytes 0.2 0.0 - 1.3 10e9/L    Absolute Eosinophils  0.0 0.0 - 0.7 10e9/L    Absolute Basophils 0.0 0.0 - 0.2 10e9/L    Abs Immature Granulocytes 0.0 0 - 0.4 10e9/L    Absolute Nucleated RBC 0.0    Comprehensive metabolic panel   Result Value Ref Range    Sodium 138 133 - 144 mmol/L    Potassium 4.2 3.4 - 5.3 mmol/L    Chloride 108 94 - 109 mmol/L    Carbon Dioxide 23 20 - 32 mmol/L    Anion Gap 8 3 - 14 mmol/L    Glucose 125 (H) 70 - 99 mg/dL    Urea Nitrogen 12 7 - 30 mg/dL    Creatinine 0.67 0.52 - 1.04 mg/dL    GFR Estimate >90 >60 mL/min/[1.73_m2]    GFR Estimate If Black >90 >60 mL/min/[1.73_m2]    Calcium 8.5 8.5 - 10.1 mg/dL    Bilirubin Total 0.5 0.2 - 1.3 mg/dL    Albumin 3.5 3.4 - 5.0 g/dL    Protein Total 7.2 6.8 - 8.8 g/dL    Alkaline Phosphatase 61 40 - 150 U/L    ALT 30 0 - 50 U/L    AST 15 0 - 45 U/L   Prealbumin   Result Value Ref Range    Prealbumin 19 15 - 45 mg/dL     Braden Gupta MS4  [facility] [employer]    TOYA RODRIGUEZ M.D., FAAP, [facility]  Professor and Director   Division of Genetics and Metabolism  Department of Pediatrics  [facility]    Routed to patient in Comm Mgt  Also to Alex Lewis  ***      Again, thank you for allowing me to participate in the care of your patient.      Sincerely,    Toya Rodriguez MD

## 2019-09-26 NOTE — LETTER
2019      RE: Shira William  [address]  [address]  [place] [address]       Adult Metabolism Clinic Return Visit  Name:  Shira William  :   1980  MRN:   [id number]  Date of Visit: Sep 26, 2019  PCP: Alex Lewis    Managing Metabolic Center(s):  [facility].    Chief Complaint:  Ms. Shira William is a 38 year old female whom I saw in follow up in Metabolism Clinic for OTC (ornithine transcarbamylase deficiency) (H).  Ms. Wililam was accompanied to this visit by her  no others. She also saw our dietitian, nurse and nurse practitioner at this visit.     Assessment:    Shira has been stable overall with regard to her OTC deficiency without any recent episodes that suggest elevated ammonia. (The most recent was more than a year ago.) She has recently had the emergence of chronic knee pain and had a workup that showed a minor elevation of LEO. The specific cause of her joint pain is not clear but her rheumatologist suggests that her pain could benefit from methotrexate in addition to her every 3 month steroid injections. Shira is willing to go without this for now but if this medication is recommended we would be happy to help manage this medication in the setting of her underlying OTC deficiency.      Plan:    1. Testing ordered at this visit:   Orders Placed This Encounter   Procedures     DIET CONSULT COMPREHENSIVE   Additional labs were obtained immediately before the visit, see below.    Results are as noted, below. Additional recommendations based on these laboratory results:  Glutamine was normal suggesting no chronic hyperammonemia is present.  Several other aminoacids are low consistent with dietary restriction. Hepatic proteins as a measure of protein intake adequacy showed a normal profile of proteins suggesting general protein sufficiency.  Plasma carnitine remains borderline; she should be careful to take that on a  regular basis.  Liver enzymes and kidney function testing looked normal.  Her blood glucose was borderline elevated.  2. Medications: Continue current medications  3. Metabolic dietician follow up with Jenni Joyner RD, LD at this visit to review special dietary concerns. Metabolic diet should be continued as prescribed.  See nutrition history, below. They discussed:  ---  Nutrition Education:   Discussed and reviewed continuing on low protein diet.     Reviewed weight changes, intake, and most recent labs.  -current intake meeting advised protein needs; labs indicate protein sufficiency   -reviewed weight changes and encouraged that weight has not gone up which is positive.  Discussed ways to reduce calories such as trying to eat less fast food and alternating between fries/salad or other non-starch options as a side.  -Encouraged increased activity as able      Goals  1. Gradual weight loss/lower BMI  2. Meet >85% estimated nutrition needs through low protein diet  3. Ammonia/GLUT levels WNL  ---  4. Continue to observe emergency precautions as previously discussed.  Our on-call metabolic service is available 24 hours/day by calling the page  ([phone]) and asking for the Genetics and Metabolism doctor on call.  5. Return to the Adult Metabolism Clinic in 6 months for follow-up.     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 She has not had any episodes that suggest elevated blood ammonia. She  reported feeling stressed by her 's incarceration an living with her parents. Her father's health has also been fluctuating, which is a stressor for Shira.  She has been having bilateral knee pain for which she is seeing rheumatology. They recommend that methotrexate may be helpful. Steroid injections seem to help as well but she in not due for one until November. Shira otherwise continues to work on loosing weight, and isn't having problems maintaining protein intake.     Interval History:  Shira was last seen in our clinic on 03/28/2019.  Since the last clinic visit Shira was not seen for any urgent clinic visits.  She was not seen in the emergency department.  She currently knows how to contact the 24 hour metabolic specialist on call and has a written emergency letter for this condition.  No hospitalizations occurred.  She had no general anesthesia and no surgical procedures.      Other health services currently received are primary care, dietitian, rheumatology  Current research study participation:  Longitudinal Study of Urea Cycle Disorders.             Past Medical History:  No past medical history on file.    Personal History:  Family History Update:  There was no new family history information elicited at this visit  Family History   Problem Relation Age of Onset     Genetic Disorder Mother         OTC deficiency     Genetic Disorder Brother         OTC deficiency   .    Lives in her apartment but spends most of her time currently with father and mother has had a home health aide that visits periodically.  Of note, her  been be remains incarcerated.  She sees him occasionally at his supervised workplace.      Current insurance status state/federal program (Medicaid/Medicare).     Neuropsychological evaluation was not performed since the last clinic visit.    Behavioral concerns: none reported.     Education/Vocation: working as a volunteer.    I have reviewed Shira s past  medical history, family history, social history, medications and allergies as documented in the patient's electronic medical record.  There were no additional findings except as noted.     Review of Systems:  Constitional: negative  Eyes: negative - normal vision with corrective lenses  Ears/Nose/Throat: negative - normal hearing  Respiratory: negative  Cardiovascular: On atenolol  Gastrointestinal: IBS, takes glycopyrolate which is helpful  Genitourinary: negative  Hematologic/Lymphatic: negative  Allergy/Immunologic: negative - no drug allergies  Musculoskeletal: See HPI for details on her knee pain.  Endocrine: negative  Integument: negative  Neurologic: intellectual disability  Psychiatric: negative    Nutrition History:   Patient is on a protein-restricted diet of 40-45 g/day.      Patient brings in a food log today; Food log likely indicates range of 35-57 grams protein with average 46 g/day (0.4 g/kg actual weight and 0.6 g/kg adjusted weight; 70% from high biological protein).  Patient notes she goes out to eat quite a bit at fast food places.  She feels she could be doing better on her weight.     Medications:  Current Outpatient Medications   Medication Sig Dispense Refill     atenolol (TENORMIN) 50 MG tablet Take 50 mg by mouth daily       calcium-vitamin D (CALCIUM 600 + D) 600-400 MG-UNIT per tablet Take 1 tablet by mouth 2 times daily (with meals). 60 tablet 0     glycerol Phenylbutyrate (RAVICTI) 1.1 GM/ML solution Take 3.4 mLs (3.74 g) by mouth 3 times daily with food 306 mL 11     glycopyrrolate (ROBINUL) 0.4 MG/2ML injection Inject 2 mg into the vein 3 times daily       ibuprofen (ADVIL,MOTRIN) 400 MG tablet Take 400 mg by mouth every 6 hours as needed for moderate pain       l-citrulline POWD Take 1.8 g by mouth 4 times daily 250 g 12     levOCARNitine (CARNITOR) 1 GM/10ML solution Take 5 mLs (500 mg) by mouth 3 times daily 450 mL 11     Multiple Vitamins-Iron (MULTIVITAMIN  /IRON) TABS Take 1  "tablet daily.       omeprazole (PRILOSEC) 20 MG capsule Take by mouth daily       ondansetron (ZOFRAN-ODT) 4 MG disintegrating tablet Take by mouth every 12 hours as needed for nausea       Sertraline HCl (ZOLOFT PO) Take 50 mg by mouth daily        Allergies:  Allergies   Allergen Reactions     Compazine [Prochlorperazine]      Erythromycin      Msg [Monosodium Glutamate]       Physical Examination:  Blood pressure 112/77, pulse 78, height 5' 5\" (165.1 cm), weight 244 lb (110.7 kg), last menstrual period 08/01/2013.  Body surface area is 2.25 meters squared.  BMI: Body mass index is 40.6 kg/m .  General: This was a well-developed, well-nourished female who responded appropriately to all requests during the examination.    Head and Neck:  She had hair of normal texture and distribution and her head was proportionate in appearance.The neck was supple without lymphadenopathy.   Eyes:  The pupils were equal, round, and reacted to light.   The conjunctivae were clear.   Ears:  Her ears were normal in architecture and placement.   Nose: The nose was clear.    Mouth and Throat: her dentition was normal.  The throat was without erythema.    Respiratory: The chest was clear to auscultation and had a symmetric appearance.    CV:  On examination of the heart, the rhythm was regular and there was no murmur.    GI: The abdomen was soft and had normal bowel sounds.  There was no hepatosplenomegaly.    : I deferred a  examination.   Musculoskeletal: There was a full range of motion on the extremity exam, and normal muscular volume and bulk.   Neurologic: The neurologic exam was normal, with normal cranial nerves, normal deep tendon reflexes, normal sensation, and a normal gait. She had normal tone.   Integument: The skin was normal with no rashes or unusual pigmentation.    Results of laboratory studies collected at this visit and available when this note was completed:   Results for orders placed or performed in visit on " 09/26/19   CBC with platelets differential   Result Value Ref Range    WBC 4.9 4.0 - 11.0 10e9/L    RBC Count 4.91 3.8 - 5.2 10e12/L    Hemoglobin 13.6 11.7 - 15.7 g/dL    Hematocrit 42.4 35.0 - 47.0 %    MCV 86 78 - 100 fl    MCH 27.7 26.5 - 33.0 pg    MCHC 32.1 31.5 - 36.5 g/dL    RDW 13.3 10.0 - 15.0 %    Platelet Count 204 150 - 450 10e9/L    Diff Method Automated Method     % Neutrophils 62.8 %    % Lymphocytes 32.4 %    % Monocytes 3.0 %    % Eosinophils 0.8 %    % Basophils 0.8 %    % Immature Granulocytes 0.2 %    Nucleated RBCs 0 0 /100    Absolute Neutrophil 3.1 1.6 - 8.3 10e9/L    Absolute Lymphocytes 1.6 0.8 - 5.3 10e9/L    Absolute Monocytes 0.2 0.0 - 1.3 10e9/L    Absolute Eosinophils 0.0 0.0 - 0.7 10e9/L    Absolute Basophils 0.0 0.0 - 0.2 10e9/L    Abs Immature Granulocytes 0.0 0 - 0.4 10e9/L    Absolute Nucleated RBC 0.0    Comprehensive metabolic panel   Result Value Ref Range    Sodium 138 133 - 144 mmol/L    Potassium 4.2 3.4 - 5.3 mmol/L    Chloride 108 94 - 109 mmol/L    Carbon Dioxide 23 20 - 32 mmol/L    Anion Gap 8 3 - 14 mmol/L    Glucose 125 (H) 70 - 99 mg/dL    Urea Nitrogen 12 7 - 30 mg/dL    Creatinine 0.67 0.52 - 1.04 mg/dL    GFR Estimate >90 >60 mL/min/[1.73_m2]    GFR Estimate If Black >90 >60 mL/min/[1.73_m2]    Calcium 8.5 8.5 - 10.1 mg/dL    Bilirubin Total 0.5 0.2 - 1.3 mg/dL    Albumin 3.5 3.4 - 5.0 g/dL    Protein Total 7.2 6.8 - 8.8 g/dL    Alkaline Phosphatase 61 40 - 150 U/L    ALT 30 0 - 50 U/L    AST 15 0 - 45 U/L   Carnitine free and total   Result Value Ref Range    Carnitine Free 24 (L) 25 - 60 umol/L    Carnitine Total 30 (L) 34 - 86 umol/L    Carnitine Esterified 6 5 - 29 umol/L    Carnitine Esterified/Free Ratio 0.2 0.1 - 1.0   Prealbumin   Result Value Ref Range    Prealbumin 19 15 - 45 mg/dL   Amino acids plasma quantitative   Result Value Ref Range    A-Aminoadipic <1 0 - 6 umol/dL    Alanine 54 22 - 62 umol/dL    Anserine Negative umol/dL    Arginine 13 2 - 18  umol/dL    Asparagine 6 (H) 1 - 5 umol/dL    Aspartic Acid <1 0 - 4 umol/dL    B-Alanine Plasma Negative umol/dL    B-Aminoisobutyric Negative umol/dL    Carnosine Negative umol/dL    Citrulline 38.4 (H) 1.3 - 6.0 umol/dL    Cystathionine Negative umol/dL    Cystine 11 3 - 15 umol/dL    Glutamic Acid 12 0 - 16 umol/dL    Glutamine 70 41 - 86 umol/dL    Glycine 24 13 - 50 umol/dL    Histidine 9 3 - 15 umol/dL    1-Methylhistidine Negative 0 - 2 umol/dL    3-Methylhistidine <1 0 - 1 umol/dL    Homocysteine umol/dL Negative umol/dL    Hydroxylysine Negative umol/dL    Hydroxyproline 2 0 - 3 umol/dL    Isoleucine 8 4 - 11 umol/dL    Leucine 16 8 - 21 umol/dL    Lysine 25 6 - 26 umol/dL    Methionine 4 1 - 6 umol/dL    Ornithine 9 2 - 16 umol/dL    Phenylalanine umol/dL 6 3 - 10 umol/dL    Proline 24 0 - 48 umol/dL    Sarcosine Plasma Negative umol/dL    Serine 9 4 - 18 umol/dL    Taurine 4 (L) 7 - 32 umol/dL    Threonine 13 5 - 25 umol/dL    Tyrosine 8 4 - 13 umol/dL    Valine 28 8 - 46 umol/dL     Braden Gupta MS4  [facility] [employer]    I was present with the medical student who participated in the service and in the documentation of the note. I have verified the history and personally performed the physical exam and medical decision making. I agree with the assessment and plan of care as documented in the note    Electronically signed by:  CHUN RODRIGUEZ M.D., FAAP, FACMG  Professor and Director   Division of Genetics and Metabolism  Department of Pediatrics  [facility]    Routed to patient in Comm Mgt  Also to Alex Lewis

## 2019-09-26 NOTE — PROGRESS NOTES
Adult Metabolism Clinic Return Visit  Name:  Shira William  :   1980  MRN:   [id number]  Date of Visit: Sep 26, 2019  PCP: Alex Lewis    Managing Metabolic Center(s):  [facility].    Chief Complaint:  Ms. Shira William is a 38 year old female whom I saw in follow up in Metabolism Clinic for OTC (ornithine transcarbamylase deficiency) (H).  Ms. William was accompanied to this visit by her  no others. She also saw our dietitian, nurse and nurse practitioner at this visit.     Assessment:    Shira has been stable overall with regard to her OTC deficiency without any recent episodes that suggest elevated ammonia. (The most recent was more than a year ago.) She has recently had the emergence of chronic knee pain and had a workup that showed a minor elevation of LEO. The specific cause of her joint pain is not clear but her rheumatologist suggests that her pain could benefit from methotrexate in addition to her every 3 month steroid injections. Shira is willing to go without this for now but if this medication is recommended we would be happy to help manage this medication in the setting of her underlying OTC deficiency.      Plan:    1. Testing ordered at this visit:   Orders Placed This Encounter   Procedures     DIET CONSULT COMPREHENSIVE   Additional labs were obtained immediately before the visit, see below.    Results are as noted, below. Additional recommendations based on these laboratory results:  Glutamine was normal suggesting no chronic hyperammonemia is present.  Several other aminoacids are low consistent with dietary restriction. Hepatic proteins as a measure of protein intake adequacy showed a normal profile of proteins suggesting general protein sufficiency.  Plasma carnitine remains borderline; she should be careful to take that on a regular basis.  Liver enzymes and kidney function testing looked normal.  Her blood glucose  was borderline elevated.  2. Medications: Continue current medications  3. Metabolic dietician follow up with Jenni Joyner RD, LD at this visit to review special dietary concerns. Metabolic diet should be continued as prescribed.  See nutrition history, below. They discussed:  ---  Nutrition Education:   Discussed and reviewed continuing on low protein diet.     Reviewed weight changes, intake, and most recent labs.  -current intake meeting advised protein needs; labs indicate protein sufficiency   -reviewed weight changes and encouraged that weight has not gone up which is positive.  Discussed ways to reduce calories such as trying to eat less fast food and alternating between fries/salad or other non-starch options as a side.  -Encouraged increased activity as able      Goals  1. Gradual weight loss/lower BMI  2. Meet >85% estimated nutrition needs through low protein diet  3. Ammonia/GLUT levels WNL  ---  4. Continue to observe emergency precautions as previously discussed.  Our on-call metabolic service is available 24 hours/day by calling the page  ([phone]) and asking for the Genetics and Metabolism doctor on call.  5. Return to the Adult Metabolism Clinic in 6 months for follow-up.     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 She has not had any episodes that suggest elevated blood ammonia. She reported feeling stressed by her 's incarceration an living with her parents. Her  father's health has also been fluctuating, which is a stressor for Shira.  She has been having bilateral knee pain for which she is seeing rheumatology. They recommend that methotrexate may be helpful. Steroid injections seem to help as well but she in not due for one until November. Shira otherwise continues to work on loosing weight, and isn't having problems maintaining protein intake.     Interval History:  Shira was last seen in our clinic on 03/28/2019.  Since the last clinic visit Shira was not seen for any urgent clinic visits.  She was not seen in the emergency department.  She currently knows how to contact the 24 hour metabolic specialist on call and has a written emergency letter for this condition.  No hospitalizations occurred.  She had no general anesthesia and no surgical procedures.      Other health services currently received are primary care, dietitian, rheumatology  Current research study participation:  Longitudinal Study of Urea Cycle Disorders.             Past Medical History:  No past medical history on file.    Personal History:  Family History Update:  There was no new family history information elicited at this visit  Family History   Problem Relation Age of Onset     Genetic Disorder Mother         OTC deficiency     Genetic Disorder Brother         OTC deficiency   .    Lives in her apartment but spends most of her time currently with father and mother has had a home health aide that visits periodically.  Of note, her  been be remains incarcerated.  She sees him occasionally at his supervised workplace.      Current insurance status state/federal program (Medicaid/Medicare).     Neuropsychological evaluation was not performed since the last clinic visit.    Behavioral concerns: none reported.     Education/Vocation: working as a volunteer.    I have reviewed Shira s past medical history, family history, social history, medications and allergies as documented in  the patient's electronic medical record.  There were no additional findings except as noted.     Review of Systems:  Constitional: negative  Eyes: negative - normal vision with corrective lenses  Ears/Nose/Throat: negative - normal hearing  Respiratory: negative  Cardiovascular: On atenolol  Gastrointestinal: IBS, takes glycopyrolate which is helpful  Genitourinary: negative  Hematologic/Lymphatic: negative  Allergy/Immunologic: negative - no drug allergies  Musculoskeletal: See HPI for details on her knee pain.  Endocrine: negative  Integument: negative  Neurologic: intellectual disability  Psychiatric: negative    Nutrition History:   Patient is on a protein-restricted diet of 40-45 g/day.      Patient brings in a food log today; Food log likely indicates range of 35-57 grams protein with average 46 g/day (0.4 g/kg actual weight and 0.6 g/kg adjusted weight; 70% from high biological protein).  Patient notes she goes out to eat quite a bit at fast food places.  She feels she could be doing better on her weight.     Medications:  Current Outpatient Medications   Medication Sig Dispense Refill     atenolol (TENORMIN) 50 MG tablet Take 50 mg by mouth daily       calcium-vitamin D (CALCIUM 600 + D) 600-400 MG-UNIT per tablet Take 1 tablet by mouth 2 times daily (with meals). 60 tablet 0     glycerol Phenylbutyrate (RAVICTI) 1.1 GM/ML solution Take 3.4 mLs (3.74 g) by mouth 3 times daily with food 306 mL 11     glycopyrrolate (ROBINUL) 0.4 MG/2ML injection Inject 2 mg into the vein 3 times daily       ibuprofen (ADVIL,MOTRIN) 400 MG tablet Take 400 mg by mouth every 6 hours as needed for moderate pain       l-citrulline POWD Take 1.8 g by mouth 4 times daily 250 g 12     levOCARNitine (CARNITOR) 1 GM/10ML solution Take 5 mLs (500 mg) by mouth 3 times daily 450 mL 11     Multiple Vitamins-Iron (MULTIVITAMIN  /IRON) TABS Take 1 tablet daily.       omeprazole (PRILOSEC) 20 MG capsule Take by mouth daily       ondansetron  "(ZOFRAN-ODT) 4 MG disintegrating tablet Take by mouth every 12 hours as needed for nausea       Sertraline HCl (ZOLOFT PO) Take 50 mg by mouth daily        Allergies:  Allergies   Allergen Reactions     Compazine [Prochlorperazine]      Erythromycin      Msg [Monosodium Glutamate]       Physical Examination:  Blood pressure 112/77, pulse 78, height 5' 5\" (165.1 cm), weight 244 lb (110.7 kg), last menstrual period 08/01/2013.  Body surface area is 2.25 meters squared.  BMI: Body mass index is 40.6 kg/m .  General: This was a well-developed, well-nourished female who responded appropriately to all requests during the examination.    Head and Neck:  She had hair of normal texture and distribution and her head was proportionate in appearance.The neck was supple without lymphadenopathy.   Eyes:  The pupils were equal, round, and reacted to light.   The conjunctivae were clear.   Ears:  Her ears were normal in architecture and placement.   Nose: The nose was clear.    Mouth and Throat: her dentition was normal.  The throat was without erythema.    Respiratory: The chest was clear to auscultation and had a symmetric appearance.    CV:  On examination of the heart, the rhythm was regular and there was no murmur.    GI: The abdomen was soft and had normal bowel sounds.  There was no hepatosplenomegaly.    : I deferred a  examination.   Musculoskeletal: There was a full range of motion on the extremity exam, and normal muscular volume and bulk.   Neurologic: The neurologic exam was normal, with normal cranial nerves, normal deep tendon reflexes, normal sensation, and a normal gait. She had normal tone.   Integument: The skin was normal with no rashes or unusual pigmentation.    Results of laboratory studies collected at this visit and available when this note was completed:   Results for orders placed or performed in visit on 09/26/19   CBC with platelets differential   Result Value Ref Range    WBC 4.9 4.0 - 11.0 10e9/L "    RBC Count 4.91 3.8 - 5.2 10e12/L    Hemoglobin 13.6 11.7 - 15.7 g/dL    Hematocrit 42.4 35.0 - 47.0 %    MCV 86 78 - 100 fl    MCH 27.7 26.5 - 33.0 pg    MCHC 32.1 31.5 - 36.5 g/dL    RDW 13.3 10.0 - 15.0 %    Platelet Count 204 150 - 450 10e9/L    Diff Method Automated Method     % Neutrophils 62.8 %    % Lymphocytes 32.4 %    % Monocytes 3.0 %    % Eosinophils 0.8 %    % Basophils 0.8 %    % Immature Granulocytes 0.2 %    Nucleated RBCs 0 0 /100    Absolute Neutrophil 3.1 1.6 - 8.3 10e9/L    Absolute Lymphocytes 1.6 0.8 - 5.3 10e9/L    Absolute Monocytes 0.2 0.0 - 1.3 10e9/L    Absolute Eosinophils 0.0 0.0 - 0.7 10e9/L    Absolute Basophils 0.0 0.0 - 0.2 10e9/L    Abs Immature Granulocytes 0.0 0 - 0.4 10e9/L    Absolute Nucleated RBC 0.0    Comprehensive metabolic panel   Result Value Ref Range    Sodium 138 133 - 144 mmol/L    Potassium 4.2 3.4 - 5.3 mmol/L    Chloride 108 94 - 109 mmol/L    Carbon Dioxide 23 20 - 32 mmol/L    Anion Gap 8 3 - 14 mmol/L    Glucose 125 (H) 70 - 99 mg/dL    Urea Nitrogen 12 7 - 30 mg/dL    Creatinine 0.67 0.52 - 1.04 mg/dL    GFR Estimate >90 >60 mL/min/[1.73_m2]    GFR Estimate If Black >90 >60 mL/min/[1.73_m2]    Calcium 8.5 8.5 - 10.1 mg/dL    Bilirubin Total 0.5 0.2 - 1.3 mg/dL    Albumin 3.5 3.4 - 5.0 g/dL    Protein Total 7.2 6.8 - 8.8 g/dL    Alkaline Phosphatase 61 40 - 150 U/L    ALT 30 0 - 50 U/L    AST 15 0 - 45 U/L   Carnitine free and total   Result Value Ref Range    Carnitine Free 24 (L) 25 - 60 umol/L    Carnitine Total 30 (L) 34 - 86 umol/L    Carnitine Esterified 6 5 - 29 umol/L    Carnitine Esterified/Free Ratio 0.2 0.1 - 1.0   Prealbumin   Result Value Ref Range    Prealbumin 19 15 - 45 mg/dL   Amino acids plasma quantitative   Result Value Ref Range    A-Aminoadipic <1 0 - 6 umol/dL    Alanine 54 22 - 62 umol/dL    Anserine Negative umol/dL    Arginine 13 2 - 18 umol/dL    Asparagine 6 (H) 1 - 5 umol/dL    Aspartic Acid <1 0 - 4 umol/dL    B-Alanine Plasma  Negative umol/dL    B-Aminoisobutyric Negative umol/dL    Carnosine Negative umol/dL    Citrulline 38.4 (H) 1.3 - 6.0 umol/dL    Cystathionine Negative umol/dL    Cystine 11 3 - 15 umol/dL    Glutamic Acid 12 0 - 16 umol/dL    Glutamine 70 41 - 86 umol/dL    Glycine 24 13 - 50 umol/dL    Histidine 9 3 - 15 umol/dL    1-Methylhistidine Negative 0 - 2 umol/dL    3-Methylhistidine <1 0 - 1 umol/dL    Homocysteine umol/dL Negative umol/dL    Hydroxylysine Negative umol/dL    Hydroxyproline 2 0 - 3 umol/dL    Isoleucine 8 4 - 11 umol/dL    Leucine 16 8 - 21 umol/dL    Lysine 25 6 - 26 umol/dL    Methionine 4 1 - 6 umol/dL    Ornithine 9 2 - 16 umol/dL    Phenylalanine umol/dL 6 3 - 10 umol/dL    Proline 24 0 - 48 umol/dL    Sarcosine Plasma Negative umol/dL    Serine 9 4 - 18 umol/dL    Taurine 4 (L) 7 - 32 umol/dL    Threonine 13 5 - 25 umol/dL    Tyrosine 8 4 - 13 umol/dL    Valine 28 8 - 46 umol/dL     Braden Gupta MS4  [facility] [employer]    I was present with the medical student who participated in the service and in the documentation of the note. I have verified the history and personally performed the physical exam and medical decision making. I agree with the assessment and plan of care as documented in the note    Electronically signed by:  CHUN RODRIGUEZ M.D., FAAP, FAC  Professor and Director   Division of Genetics and Metabolism  Department of Pediatrics  [facility]    Routed to patient in Comm Mgt  Also to Alex Lewis

## 2019-09-27 LAB
(HCYS)2 SERPL-SCNC: NEGATIVE UMOL/DL
1ME-HIST SERPL-SCNC: NEGATIVE UMOL/DL (ref 0–2)
3ME-HISTIDINE SERPL-SCNC: <1 UMOL/DL (ref 0–1)
AAA SERPL-SCNC: <1 UMOL/DL (ref 0–6)
ALANINE SERPL-SCNC: NEGATIVE UMOL/DL
ALANINE SFR SERPL: 54 UMOL/DL (ref 22–62)
ANSERINE SERPL-SCNC: NEGATIVE UMOL/DL
ARGININE SERPL-SCNC: 13 UMOL/DL (ref 2–18)
ASPARAGINE SERPL-SCNC: 6 UMOL/DL (ref 1–5)
ASPARTATE SERPL-SCNC: <1 UMOL/DL (ref 0–4)
B-AIB SERPL-SCNC: NEGATIVE UMOL/DL
CARNOSINE SERPL-SCNC: NEGATIVE UMOL/DL
CITRULLINE SERPL-SCNC: 38.4 UMOL/DL (ref 1.3–6)
CYSTATHIONIN SERPL-SCNC: NEGATIVE UMOL/DL
CYSTINE SERPL-SCNC: 11 UMOL/DL (ref 3–15)
GLUTAMATE SERPL-SCNC: 12 UMOL/DL (ref 0–16)
GLUTAMATE SERPL-SCNC: 70 UMOL/DL (ref 41–86)
GLYCINE SERPL-SCNC: 24 UMOL/DL (ref 13–50)
HISTIDINE SERPL-SCNC: 9 UMOL/DL (ref 3–15)
ISOLEUCINE SERPL-SCNC: 8 UMOL/DL (ref 4–11)
LEUCINE SERPL-SCNC: 16 UMOL/DL (ref 8–21)
LYSINE SERPL-SCNC: 25 UMOL/DL (ref 6–26)
METHIONINE SERPL-SCNC: 4 UMOL/DL (ref 1–6)
OH-LYSINE SERPL-SCNC: NEGATIVE UMOL/DL
OH-PROLINE SERPL-SCNC: 2 UMOL/DL (ref 0–3)
ORNITHINE SERPL-SCNC: 9 UMOL/DL (ref 2–16)
PHE SERPL-SCNC: 6 UMOL/DL (ref 3–10)
PROLINE SERPL-SCNC: 24 UMOL/DL (ref 0–48)
SARCOSINE SERPL-SCNC: NEGATIVE UMOL/DL
SERINE SERPL-SCNC: 9 UMOL/DL (ref 4–18)
TAURINE SERPL-SCNC: 4 UMOL/DL (ref 7–32)
THREONINE SERPL-SCNC: 13 UMOL/DL (ref 5–25)
TYROSINE SERPL-SCNC: 8 UMOL/DL (ref 4–13)
VALINE SERPL-SCNC: 28 UMOL/DL (ref 8–46)

## 2019-09-28 LAB
ACYLCARNITINE SERPL-SCNC: 6 UMOL/L (ref 5–29)
CARN ESTERS/C0 SERPL-SRTO: 0.2 {RATIO} (ref 0.1–1)
CARNITINE FREE SERPL-SCNC: 24 UMOL/L (ref 25–60)
CARNITINE SERPL-SCNC: 30 UMOL/L (ref 34–86)

## 2019-10-01 NOTE — PROGRESS NOTES
CLINICAL NUTRITION SERVICES - PEDIATRIC ASSESSMENT NOTE      REASON FOR ASSESSMENT  Shira William is a 38 year old female seen by the dietitian for consult regarding OTC deficiency.      ANTHROPOMETRICS  Height: 165.2 cm  Weight: 110 kg  BMI: 40.3  Ideal body weight: 55 kg  Adjusted body weight: 83 kg      Comments: weight down ~2 lbs since visit 2 months ago      NUTRITION HISTORY  Patient is on a protein-restricted diet of 40-45 g/day.      Patient brings in a food log today; Food log likely indicates range of 35-57 grams protein with average 46 g/day (0.4 g/kg actual weight and 0.6 g/kg adjusted weight; 70% from high biological protein).  Patient notes she goes out to eat quite a bit at fast food places.  She feels she could be doing better on her weight.     LABS  Labs reviewed;  Amino acids -   GLUT: 70 (70)  RADHA: 16 (17)  ILE: 8 (9)  KIERRA: 28 (29)  Prealbumin - 19 (21)      MEDICATIONS  Medications reviewed; includes MVI, calcium/Vit D (600/400) bid, Ravicti, citrulline      ASSESSED NUTRITION NEEDS:  Estimated Energy Needs: 20-25 kcal/kg  Estimated Protein Needs: 0.6-0.8 g/kg using ABW  Micronutrient Needs: 1200 mg calcium, 600 IU Vitamin D, 18 mg iron      NUTRITION DIAGNOSIS:  Impaired nutrient utilization related to diagnosis of OTC deficiency as evidenced by risk of hyperammonemia with stress/illness, catabolism, or very high protein intake.      Obesity related to excessive calorie intake and lack of exercise as evidenced by BMI in obese range.      INTERVENTIONS  Nutrition Prescription  Meet 100% estimated kcal, protein, amino acid, vitamin/mineral needs through low protein diet    Nutrition Education:   Discussed and reviewed continuing on low protein diet.     Reviewed weight changes, intake, and most recent labs.  -current intake meeting advised protein needs; labs indicate protein sufficiency   -reviewed weight changes and encouraged that weight has not gone up which is positive.  Discussed ways  to reduce calories such as trying to eat less fast food and alternating between fries/salad or other non-starch options as a side.  -Encouraged increased activity as able      Goals  1. Gradual weight loss/lower BMI  2. Meet >85% estimated nutrition needs through low protein diet  3. Ammonia/GLUT levels WNL    FOLLOW UP/MONITORING  Energy Intake  Macronutrient intake  Anthropometric measurements      Time spent with patient: no charge

## 2020-01-07 ENCOUNTER — TELEPHONE (OUTPATIENT)
Dept: ENDOCRINOLOGY | Facility: CLINIC | Age: 40
End: 2020-01-07

## 2020-01-07 NOTE — TELEPHONE ENCOUNTER
1/07/2020 @ 12pm-       Placed call to initiate verbal annual formulary exception/PA. Non-formulary approval for patient's Ravicti was granted and authorized from 1/01/2020-1/06/2021. Case confirmation number: [id number]. Representative stated faxed determination should be faxed to our office. Patient, pharmacy and [facility] Patient  updated on approval. No further needs at this time.

## 2020-03-26 ENCOUNTER — TELEPHONE (OUTPATIENT)
Dept: ENDOCRINOLOGY | Facility: CLINIC | Age: 40
End: 2020-03-26

## 2020-03-26 ENCOUNTER — VIRTUAL VISIT (OUTPATIENT)
Dept: PEDIATRICS | Facility: CLINIC | Age: 40
End: 2020-03-26
Attending: DIETITIAN, REGISTERED
Payer: MEDICARE

## 2020-03-26 ENCOUNTER — VIRTUAL VISIT (OUTPATIENT)
Dept: ENDOCRINOLOGY | Facility: CLINIC | Age: 40
End: 2020-03-26
Payer: MEDICARE

## 2020-03-26 DIAGNOSIS — E72.4 OTC (ORNITHINE TRANSCARBAMYLASE DEFICIENCY) (H): Primary | ICD-10-CM

## 2020-03-26 NOTE — PROGRESS NOTES
"Shira William is a 39 year old female who is being evaluated via a billable telephone visit.      The patient has been notified of following: spoke with Shira about her phone call  visit in the number to reach her at is [phone].     \"This telephone visit will be conducted via a call between you and your physician/provider. We have found that certain health care needs can be provided without the need for a physical exam.  This service lets us provide the care you need with a short phone conversation.  If a prescription is necessary we can send it directly to your pharmacy.  If lab work is needed we can place an order for that and you can then stop by our lab to have the test done at a later time.    If during the course of the call the physician/provider feels a telephone visit is not appropriate, you will not be charged for this service.\"     Shira William is having visit for OTC deficiency    I have reviewed and updated the patient's Past Medical History, Social History, Family History and Medication List.    ALLERGIES  Compazine [prochlorperazine]; Erythromycin; and Msg [monosodium glutamate]    Additional provider notes:   Shira felt that overall her health was good although she is having occasional \"gut ache\". She had not had any episodes that suggested metabolic decompensation in the interval since I saw her last on September 26, 2019.    Shira's main concern at this visit was her continuing knee pain. She has gone to a new orthopedist who did x-rays and MRI and noted that her kneecap slides. He indicated that surgery would not probably be helpful. She wanted to have improved range of motion so a cortisone shot was administered with some affect she also began physical therapy and this also helped with her movement. Unfortunately the ability to continue physical therapy is now limited. Search remains an option but her doctor indicated that this could not take place; She lost 50-60 pounds. " She has srdr40-55 pounds but has more to go she thinks. She has been using a knee brace occasionally primarily to walk upstairs. He discouraged this general use, however.    Shira has been living at home with her parents since September. She anticipates returning to her apartment shortly, however, because postinvasion, who has been incarcerated will be returning home.    The family has been able to receive groceries during the pandemic shut down through volunteers from [employer]. There are also other family members who have been helping.    Shira was also very concerned about her father's house. He had a very severe episode of respiratory failure and, although he has much improved, was extremely ill during that episode.     Shira was ready to receive her telephone call from our dietitian.    Assessment/Plan:  1. OTC (ornithine transcarbamylase) deficiency   Shira's OTC deficiency is in general good control. She maintains compliance with her prescribed medications and is working to maintain a low protein intake as well.    - DIET CONSULT COMPREHENSIVE  -  I anticipate labs will be done at a future date when this will not present difficulty with going to get them checked.  -  Return to clinic in six months    Phone call initiated 1:22 PM  Phone call duration:  25 minutes    CHUN RODRIGUEZ M.D., MERNAP, [facility]  Professor and Director   Division of Genetics and Metabolism  Department of Pediatrics  [facility]

## 2020-04-01 NOTE — PROGRESS NOTES
"..Shira William is a 39 year old female who is being evaluated via a billable telephone visit.      The patient has been notified of following:     \"This telephone visit will be conducted via a call between you and your physician/provider. We have found that certain health care needs can be provided without the need for a physical exam.  This service lets us provide the care you need with a short phone conversation.  If a prescription is necessary we can send it directly to your pharmacy.  If lab work is needed we can place an order for that and you can then stop by our lab to have the test done at a later time.    If during the course of the call the physician/provider feels a telephone visit is not appropriate, you will not be charged for this service.\"     Patient has given verbal consent for Telephone visit?  YES    Shira William complains of  No chief complaint on file.      I have reviewed and updated the patient's Past Medical History, Social History, Family History and Medication List.    ALLERGIES  Compazine [prochlorperazine]; Erythromycin; and Msg [monosodium glutamate]    Additional provider notes: see below    Phone call duration: 30 minutes    CLINICAL NUTRITION SERVICES - PEDIATRIC ASSESSMENT NOTE      REASON FOR ASSESSMENT  Shira William is a 39 year old female seen by the dietitian for consult regarding OTC deficiency.      ANTHROPOMETRICS  Height: 65 in  Weight: 104.5 kg or 230 lbs  BMI: 38.4  Ideal body weight: 55 kg  Adjusted body weight: 80 kg      Comments: per patient, over the last 1-2 months she has lost 17 lbs but feel she has been at a plateau recently.      NUTRITION HISTORY  Patient is on a protein-restricted diet of 40-45 g/day.      Patient requires a knee surgery but needs to lose 50-60 lbs before she can have it.  She thinks her weight loss happened because she was watching her food portions but also more active with increased help required to care for her dad.  She " was doing pool therapy 2x a week but this has been discontinued.  Her  is returning home next weekend so she will no longer be staying with her parents.    Usual intake/recall:    Breakfast: 1/2 cup Rice Krispies with 1/2 cup milk  Lunch: homemade pizza with hamburger/mushrooms and mozzarella  Dinners: prepared by mom, last night was homemade chicken noodle soup.  When going out to eat at [employer] or [employer] she has made a point to only get fries every other time, vs. Every time.    When she lives on her own she will do the cooking and can make things like tater tot hot dish and Shepherds pie.       LABS  Labs reviewed; no labs, televisit  Amino acids taken 9/2019 were WNL.  Glutamine WNL.    MEDICATIONS  Medications reviewed; includes MVI, calcium/Vit D (600/400) bid, Ravicti, citrulline      ASSESSED NUTRITION NEEDS:  Estimated Energy Needs: 20-25 kcal/kg using ABW  Estimated Protein Needs: RDA for age = 0.8 g/kg, UCD/age range 0.5-1 g/kg using ABW  Micronutrient Needs: 1200 mg calcium, 600 IU Vitamin D, 18 mg iron      NUTRITION DIAGNOSIS:  Impaired nutrient utilization related to diagnosis of OTC deficiency as evidenced by risk of hyperammonemia with stress/illness, catabolism, or very high protein intake.      Obesity related to excessive calorie intake and lack of exercise as evidenced by BMI in obese range.      INTERVENTIONS  Nutrition Prescription  Meet 100% estimated kcal, protein, amino acid, vitamin/mineral needs through low protein diet    Nutrition Education:   Discussed and reviewed continuing on low protein diet.     Reviewed weight changes, intake, and most recent labs.  -continue goal of 40-45 g/day protein to provide 0.5-0.6 g/kg/day.  Remember to include high biological value protein at each meal and spread throughout the day  -weight loss: encouraged patient to use a tracking nidia such as [employer] to portion/calorie control intake (likely 0613-5232 goal).  Will also work to formulate  a portion guide for her to use at meals, as well as some healthy/easy recipes to try out  -continue to avoid pop.  Patient prefers [employer] or [employer] so currently not an issue  -encouraged increased walking as tolerated.  Since she cannot go to the gym, walk outside and gradually to increase distance/time  -we discussed the importance of trying to remind herself of her weight loss goals daily to stay on track and trying to get  to partner in weight loss efforts such as walking together, making healthy meals      Goals  1. Gradual weight loss/lower BMI  2. Meet >85% estimated nutrition needs through low protein diet  3. Ammonia/GLUT levels WNL    FOLLOW UP/MONITORING  Energy Intake  Macronutrient intake  Anthropometric measurements

## 2020-04-06 ENCOUNTER — TELEPHONE (OUTPATIENT)
Dept: CONSULT | Facility: CLINIC | Age: 40
End: 2020-04-06

## 2020-04-06 NOTE — TELEPHONE ENCOUNTER
3/26/2020 @ 1:52 pm-        Touched base with patient. Reviewed with patient Dr. Clayton's plan to hold off on labs for now. Discussed that for Urea Cycle Disorder Longitudinal Study that we would be reconsenting due to some minor changes to study including data monitoring center, data base change and NIH tool box. Reviewed that we could review the consent in more detail at her follow-up appt in ~ 6 months with Dr. Clayton. Patient verbalized understanding.

## 2020-07-08 DIAGNOSIS — E72.4 OTC (ORNITHINE TRANSCARBAMYLASE DEFICIENCY) (H): ICD-10-CM

## 2020-07-08 RX ORDER — CITRULLINE
1.8 POWDER (GRAM) MISCELLANEOUS 4 TIMES DAILY
Qty: 250 G | Refills: 12 | Status: SHIPPED | OUTPATIENT
Start: 2020-07-08 | End: 2021-12-20

## 2020-10-22 ENCOUNTER — VIRTUAL VISIT (OUTPATIENT)
Dept: ENDOCRINOLOGY | Facility: CLINIC | Age: 40
End: 2020-10-22
Payer: MEDICARE

## 2020-10-22 DIAGNOSIS — E72.4 OTC (ORNITHINE TRANSCARBAMYLASE DEFICIENCY) (H): Primary | ICD-10-CM

## 2020-10-22 PROCEDURE — 99443 PR PHYSICIAN TELEPHONE EVALUATION 21-30 MIN: CPT | Mod: 95 | Performed by: MEDICAL GENETICS

## 2020-10-22 NOTE — PROGRESS NOTES
"Shira William is a 39 year old female who is being evaluated via a billable telephone visit.      The patient has been notified of following:     \"This telephone visit will be conducted via a call between you and your physician/provider. We have found that certain health care needs can be provided without the need for a physical exam.  This service lets us provide the care you need with a short phone conversation.  If a prescription is necessary we can send it directly to your pharmacy.  If lab work is needed we can place an order for that and you can then stop by our lab to have the test done at a later time.    Telephone visits are billed at different rates depending on your insurance coverage. During this emergency period, for some insurers they may be billed the same as an in-person visit.  Please reach out to your insurance provider with any questions. If during the course of the call the physician/provider feels a telephone visit is not appropriate, you will not be charged for this service.\"    Patient has given verbal consent for Telephone visit?  Yes    Phone call duration: 22 minutes  ----------    Adult Metabolism Clinic Return Telephone Visit  Name:  Shira William  :   1980  MRN:   [id number]  Date of Visit: Oct 22, 2020  PCP: Alex Lewis    Managing Metabolic Center(s):  [facility].    Chief Complaint:  Ms. Shira William is a 39 year old female whom I visited in follow up via telephone visit for OTC (ornithine transcarbamylase) deficiency.     Assessment:    Shira is generally stable with regard to her OTC deficiency.  She has not had significant recent episodes that suggest metabolic decompensation.  She continues to work to accomplish compliance with her prescribed medication and dietary regimen.  She is also continuing to be very careful about exposure during the global pandemic.     Plan:    1. Testing ordered at this " visit:   Orders Placed This Encounter   Procedures     Plasma aminoacids, plasma carnitine, comprehensive metabolic panel, CBC with diff and platelets, prealbumin: Genetics and Metabolism External Order     2. Medications: Continue current medications  3. Continue to observe emergency precautions as previously discussed.  Our on-call metabolic service is available 24 hours/day by calling the page  ([phone]) and asking for the Genetics and Metabolism doctor on call.  4. Return to the Adult Metabolism Clinic in 6 weeks for follow-up.     5.  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  She thought she might have had some diminishment in weight as she was attempting but generally has been working to try to stay active.  Recently, neither she nor been have been able to work much because of the pandemic.  She has previously had problems with her knee which has somewhat improved recently.  She continues to be concerned that she may ultimately need some type of surgical intervention for this.    Her only notable event of concern with regard to her OTC deficiency was that a couple of months ago she had a severe headache.  She went to the emergency department but was found to have a normal ammonia.  About 2 weeks prior to this visit, she experienced nausea.  She backed off on her oral intake, sticking to clear liquids and broth and this passed after 2-3 days without other overt issues..      Interval  History:  Shira was last visited on 3/26/2020 via telephone visit.  Since the last clinic visit Shira was not seen for any urgent clinic visits.  She was not seen in the emergency department.  She currently knows how to contact the 24 hour metabolic specialist on call and has a written emergency letter for this condition.  No hospitalizations occurred.  She had no general anesthesia and no surgical procedures.      Other health services currently received are primary care, gastroenterology  Current research study participation: Longitudinal Study of Urea Cycle Disorders  .           Personal History:  Family History Update:   There was no new family history information elicited at this visit  Family History   Problem Relation Age of Onset     Genetic Disorder Mother         OTC deficiency     Genetic Disorder Brother         OTC deficiency     Lives with , Eran.  She is staying in most of the time.  She is trying as much as possible to help her mother and father as her father has severe chronic respiratory challenges and had been seriously ill this summer.  Current insurance status state/federal program (Medicaid/Medicare).      I have reviewed Shira s past medical history, family history, social history, medications and allergies as documented in the patient's electronic medical record.  There were no additional findings except as noted.      Review of Systems:  Constitional: negative-some issues regarding overweight  Eyes: negative - normal vision with corrective lenses  Ears/Nose/Throat: negative - normal hearing  Respiratory: negative  Cardiovascular: On atenolol  Gastrointestinal: IBS, takes glycopyrolate which is helpful  Genitourinary: negative  Hematologic/Lymphatic: negative  Allergy/Immunologic: negative - no drug allergies  Musculoskeletal: See HPI for details on her knee pain.  Endocrine: negative  Integument: negative  Neurologic: intellectual disability  Psychiatric:  Anxiety    Medications:  Current Outpatient Medications   Medication Sig Dispense Refill     atenolol (TENORMIN) 50 MG tablet Take 50 mg by mouth daily       calcium-vitamin D (CALCIUM 600 + D) 600-400 MG-UNIT per tablet Take 1 tablet by mouth 2 times daily (with meals). 60 tablet 0     glycerol Phenylbutyrate (RAVICTI) 1.1 GM/ML solution Take 3.4 mLs (3.74 g) by mouth 3 times daily with food 306 mL 11     glycopyrrolate (ROBINUL) 0.4 MG/2ML injection Inject 2 mg into the vein 3 times daily       ibuprofen (ADVIL,MOTRIN) 400 MG tablet Take 400 mg by mouth every 6 hours as needed for moderate pain       l-citrulline POWD Take 1.8 g by mouth 4 times daily 250 g 12     levOCARNitine (CARNITOR) 1 GM/10ML solution Take 5 mLs (500 mg) by mouth 3 times daily 450 mL 11     Multiple Vitamins-Iron (MULTIVITAMIN  /IRON) TABS Take 1 tablet daily.       omeprazole (PRILOSEC) 20 MG capsule Take by mouth daily       ondansetron (ZOFRAN-ODT) 4 MG disintegrating tablet Take by mouth every 12 hours as needed for nausea       Sertraline HCl (ZOLOFT PO) Take 50 mg by mouth daily       Allergies:  Allergies   Allergen Reactions     Compazine [Prochlorperazine]      Erythromycin      Msg [Monosodium Glutamate]       Physical Examination:  No exam: Telephone visit  -----------------  CHUN RODRIGUEZ M.D., FAAP, FACMG  Professor  Division of Genetics and Metabolism  Department of Pediatrics  [facility]    Routed to patient in Comm Mgt  Also to Alberto Lewis

## 2020-10-22 NOTE — NURSING NOTE
Chief Complaint   Patient presents with     Follow Up     OTS DEFICIENCY      Casandra Daniels CMA

## 2020-10-22 NOTE — Clinical Note
"10/22/2020       RE: Shira William  [address]  [address]  [place] [address]     Dear Colleague,    Thank you for referring your patient, Shira William, to the [facility] METABOLIC DISORDERS CLINIC [place] at [facility]. Please see a copy of my visit note below.    Shira William is a 39 year old female who is being evaluated via a billable telephone visit.      The patient has been notified of following:     \"This telephone visit will be conducted via a call between you and your physician/provider. We have found that certain health care needs can be provided without the need for a physical exam.  This service lets us provide the care you need with a short phone conversation.  If a prescription is necessary we can send it directly to your pharmacy.  If lab work is needed we can place an order for that and you can then stop by our lab to have the test done at a later time.    Telephone visits are billed at different rates depending on your insurance coverage. During this emergency period, for some insurers they may be billed the same as an in-person visit.  Please reach out to your insurance provider with any questions.    If during the course of the call the physician/provider feels a telephone visit is not appropriate, you will not be charged for this service.\"    Patient has given verbal consent for Telephone visit?  {YES-NO  Default Yes:4444::\"Yes\"}    What phone number would you like to be contacted at? ***    How would you like to obtain your AVS? {AVS Preference:608261}    Phone call duration: *** minutes  ----------    Adult Metabolism Clinic Return Visit  Name:  Shira William  :   1980  MRN:   [id number]  Date of Visit: Oct 22, 2020  PCP: Alex Lewis    Immunization status is: {IMMUNIZATION STATUS:778072596}.  Managing Metabolic Center(s):  [facility] Adult Metabolism [facility].    Chief " Complaint:  Ms. Shira William is a 39 year old female whom I saw in follow up in Metabolism Clinic for OTC (ornithine transcarbamylase deficiency) (H).  Ms. William was accompanied to this visit by her  {FAMILY MEMBERS SHORT:735087}. She also saw our {OTHER PROVIDERS:019651821} at this visit.     Assessment:    ***     Plan:    1. Testing ordered at this visit:   No orders of the defined types were placed in this encounter.  .    Results are as noted, below. Additional recommendations based on these laboratory results:  ***  2. Medications: Continue ***   3. Metabolic dietician follow up with Jenni Joyner RD, LD at this visit to review special dietary concerns. Metabolic diet should be continued as prescribed.  See nutrition history, below. They discussed:  ---  ***  ---  4.   Genetic counseling with {METABOLIC PEDIATRIC GEN COUNSELORS:895263849} to review ***.  5. Continue to observe emergency precautions as previously discussed.  Our on-call metabolic service is available 24 hours/day by calling the page  ([phone]) and asking for the Genetics and Metabolism doctor on call.  6. Return to the Adult Metabolism Clinic in *** {WEEKS OR MONTHS:626286} for follow-up.     7.  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      Interval History:  Shira was last seen in our clinic on ***.  Since the last clinic visit Shira was not seen for any urgent clinic visits.  She was not seen in the emergency department.  She currently {EMERGENCY:562934811}.  No hospitalizations occurred.  She had no  general anesthesia and no surgical procedures.      Other health services currently received are primary care***, {OTHER HEALTH SERVICES:624978586}  Current research study participation:  ***.             Past Medical History:  No past medical history on file.    Personal History:  Family History Update:  *** There was no new family history information elicited at this visit  Family History   Problem Relation Age of Onset     Genetic Disorder Mother         OTC deficiency     Genetic Disorder Brother         OTC deficiency   .    Lives with {Household:104556}  Community resources received currently are {COMMUNITY RESOURCES:831053099}.  Current insurance status {CURRENT INSURANCE STATUS:112668609}.     Neuropsychological evaluation {NEUROPSYCHOLOGICAL EVALUATION:546665047}.    Behavioral concerns: {BEHAVIORAL CONCERNS:885928477}.    Special education {SPECIAL EDUCATION:168474069} services currently received include {SPECIAL EDUCATION CLASSIFICATION:976916840}.    Education/Vocation: {SCHOOL/WORK CIRCUMSTANCES:366907711}   Social History     Socioeconomic History     Marital status: Single     Spouse name: Not on file     Number of children: Not on file     Years of education: Not on file     Highest education level: Not on file   Occupational History     Not on file   Social Needs     Financial resource strain: Not on file     Food insecurity     Worry: Not on file     Inability: Not on file     Transportation needs     Medical: Not on file     Non-medical: Not on file   Tobacco Use     Smoking status: Never Smoker     Smokeless tobacco: Never Used   Substance and Sexual Activity     Alcohol use: Not on file     Drug use: Not on file     Sexual activity: Not on file   Lifestyle     Physical activity     Days per week: Not on file     Minutes per session: Not on file     Stress: Not on file   Relationships     Social connections     Talks on phone: Not on file     Gets together: Not on file     Attends Tenriism  service: Not on file     Active member of club or organization: Not on file     Attends meetings of clubs or organizations: Not on file     Relationship status: Not on file     Intimate partner violence     Fear of current or ex partner: Not on file     Emotionally abused: Not on file     Physically abused: Not on file     Forced sexual activity: Not on file   Other Topics Concern     Not on file   Social History Narrative     Not on file        I have reviewed Shira s past medical history, family history, social history, medications and allergies as documented in the patient's electronic medical record.  There were no additional findings except as noted.     Review of Systems: ***  Constitional: negative  Eyes: negative - normal vision  Ears/Nose/Throat: negative - normal hearing  Respiratory: negative  Cardiovascular: negative  Gastrointestinal: negative  Genitourinary: negative  Hematologic/Lymphatic: negative  Allergy/Immunologic: negative - no drug allergies  Musculoskeletal: negative  Endocrine: negative  Integument: negative  Neurologic: negative  Psychiatric: negative    Nutrition History:  ***    Medications:  Current Outpatient Medications   Medication Sig Dispense Refill     atenolol (TENORMIN) 50 MG tablet Take 50 mg by mouth daily       calcium-vitamin D (CALCIUM 600 + D) 600-400 MG-UNIT per tablet Take 1 tablet by mouth 2 times daily (with meals). 60 tablet 0     glycerol Phenylbutyrate (RAVICTI) 1.1 GM/ML solution Take 3.4 mLs (3.74 g) by mouth 3 times daily with food 306 mL 11     glycopyrrolate (ROBINUL) 0.4 MG/2ML injection Inject 2 mg into the vein 3 times daily       ibuprofen (ADVIL,MOTRIN) 400 MG tablet Take 400 mg by mouth every 6 hours as needed for moderate pain       l-citrulline POWD Take 1.8 g by mouth 4 times daily 250 g 12     levOCARNitine (CARNITOR) 1 GM/10ML solution Take 5 mLs (500 mg) by mouth 3 times daily 450 mL 11     Multiple Vitamins-Iron (MULTIVITAMIN  /IRON) TABS Take 1  tablet daily.       omeprazole (PRILOSEC) 20 MG capsule Take by mouth daily       ondansetron (ZOFRAN-ODT) 4 MG disintegrating tablet Take by mouth every 12 hours as needed for nausea       Sertraline HCl (ZOLOFT PO) Take 50 mg by mouth daily          Allergies:  Allergies   Allergen Reactions     Compazine [Prochlorperazine]      Erythromycin      Msg [Monosodium Glutamate]         Physical Examination:    Results of laboratory studies collected at this visit and available when this note was completed:   No results found for any visits on 10/22/20.    TOYA RODRIGUEZ M.D., FAAP, FAC  Professor  Division of Genetics and Metabolism  Department of Pediatrics  [facility]    Routed to patient in Comm Mgt  Also to Alex Lewis  ***        Again, thank you for allowing me to participate in the care of your patient.      Sincerely,    Toya Rodriguez MD

## 2020-10-22 NOTE — LETTER
"10/22/2020       RE: Shira William  [address]  [address]  [place] [address]     Dear Colleague,    Thank you for referring your patient, Shira William, to the [facility] METABOLIC DISORD[facility] [place] at [facility]. Please see a copy of my visit note below.    ..Shira William is a 39 year old female who is being evaluated via a billable telephone visit.      The patient has been notified of following:     \"This telephone visit will be conducted via a call between you and your physician/provider. We have found that certain health care needs can be provided without the need for a physical exam.  This service lets us provide the care you need with a short phone conversation.  If a prescription is necessary we can send it directly to your pharmacy.  If lab work is needed we can place an order for that and you can then stop by our lab to have the test done at a later time.    Telephone visits are billed at different rates depending on your insurance coverage. During this emergency period, for some insurers they may be billed the same as an in-person visit.  Please reach out to your insurance provider with any questions.    If during the course of the call the physician/provider feels a telephone visit is not appropriate, you will not be charged for this service.\"    Patient has given verbal consent for Telephone visit?  YES    What phone number would you like to be contacted at?     How would you like to obtain your AVS? Mail a copy    Phone call duration: 30 minutes    CLINICAL NUTRITION SERVICES - PEDIATRIC ASSESSMENT NOTE      REASON FOR ASSESSMENT  Shira William is a 39 year old female seen by the dietitian for consult regarding OTC deficiency.      ANTHROPOMETRICS  Previous anthros from 2019  Height: 65 in  Weight: 104.5 kg or 230 lbs  BMI: 38.4  Ideal body weight: 55 kg  Adjusted body weight: 80 kg      Comments: patient has not weighed " herself recently so unsure where her weight is at.   She was feeling very discouraged after she got down to 228 lbs but at an orthopedic appointment this was not seen as an accomplishment.      NUTRITION HISTORY  Patient is on a protein-restricted diet of 40-45 g/day.      Usual intake/recall:     Breakfast: breakfast sandwich from [employer] or 1 egg + 1 slice toast  Lunch: peanut butter/jeally sandwich  Dinners: last night was chicken noodle soup + crackers.  Other recent dinners were spaghetti + meat sauce, brats or hamburgers (1 each)  Snacks: potato chips  Beverages: Bubly or Zeeshan, water     -has membership at [employer], but is a bit anxious to go with COVID concerns  - one reported ED visit with a slightly elevated ammonia over past 6 months (patient had migraine, was <100 so not admitted)  -patient was recommended by ortho to lose 50-60 lbs before having a knee surgery    Current protein goal of 40-45 gm/day providing 0.8 g/kg IBW, 0.6 g/kg ABW.    LABS  Labs reviewed; no labs, televisit  -Amino acids taken 9/2019 were WNL. -Glutamine WNL.     MEDICATIONS  Medications reviewed;   -MVI daily  -calcium/Vit D (600/400) BID  -3.4 mL Ravicti TID  -1.8 gm citrulline four times daily      ASSESSED NUTRITION NEEDS:  Estimated Energy Needs: 20-25 kcal/kg using ABW (5966-5788 kcals/day)  Estimated Protein Needs: RDA for age = 0.8 g/kg, UCD/age range 0.5-1 g/kg using ABW  Micronutrient Needs: 1200 mg calcium, 600 IU Vitamin D, 18 mg iron      NUTRITION DIAGNOSIS:  Impaired nutrient utilization related to diagnosis of OTC deficiency as evidenced by risk of hyperammonemia with stress/illness, catabolism, or very high protein intake.      Obesity related to excessive calorie intake and lack of exercise as evidenced by BMI in obese range.      INTERVENTIONS  Nutrition Prescription  Meet 100% estimated kcal, protein, amino acid, vitamin/mineral needs through low protein diet    Nutrition Education:   Discussed and  reviewed continuing on low protein diet.     Reviewed weight changes, intake, and most recent labs.  -Patient feeling very discouraged with weight loss goals at time of visit.  We spent time discussing that her most recent weight loss of ~20 lbs is not an easy accomplishment and she should feel proud of this and use it as evidence she has what it takes to lose weight.  Recommended she focus on the benefits that weight loss will bring her (more energy, improved mood, easier moving around, improvement in overall health) instead of focusing on the lack of reaction she got from others that did not recognize this achievement.  -We discussed re-focusing on her weight loss goals with cutting back on going out to eat or getting meals from the gas station.  We also discussed increasing vegetable intake by pre-planning what vegetables she will eat at dinners before going grocery shopping and buying them so they are available.  We also discussed NOT buying and keeping out of the house junk foods such as chips and other items that are easy to overeat on.  Continue to avoid pop as she has been doing for awhile.  Work at portion control at meals and consider logging intake in nutrition nidia such as [employer].  -Take advantage of warm weather days and go on walks. Try to do other activity online such as youtube videos of workouts.  Increase activity as much as possible in ways she feels comfortable doing with the current restrictions.      Goals  1. Gradual weight loss/lower BMI  -unable to assess with no updated anthros  2. Meet >85% estimated nutrition needs through low protein diet  -goal met  3. Ammonia, plasma amino acids, protein status labs WNL  -unable to assess with no recent labs    FOLLOW UP/MONITORING  Energy Intake  Macronutrient intake  Anthropometric measurements    Jenni Joyner, RD, CSP, LD

## 2020-10-22 NOTE — LETTER
"10/22/2020      RE: Shira William  [address]  [address]  [place] [address]       Shira William is a 39 year old female who is being evaluated via a billable telephone visit.      The patient has been notified of following:     \"This telephone visit will be conducted via a call between you and your physician/provider. We have found that certain health care needs can be provided without the need for a physical exam.  This service lets us provide the care you need with a short phone conversation.  If a prescription is necessary we can send it directly to your pharmacy.  If lab work is needed we can place an order for that and you can then stop by our lab to have the test done at a later time.    Telephone visits are billed at different rates depending on your insurance coverage. During this emergency period, for some insurers they may be billed the same as an in-person visit.  Please reach out to your insurance provider with any questions. If during the course of the call the physician/provider feels a telephone visit is not appropriate, you will not be charged for this service.\"    Patient has given verbal consent for Telephone visit?  Yes    Phone call duration: 22 minutes  ----------    Adult Metabolism Clinic Return Telephone Visit  Name:  Shira William  :   1980  MRN:   [id number]  Date of Visit: Oct 22, 2020  PCP: Alex Lewis    Managing Metabolic Center(s):  [facility].    Chief Complaint:  Ms. Shira William is a 39 year old female whom I visited in follow up via telephone visit for OTC (ornithine transcarbamylase) deficiency.     Assessment:    Shira is generally stable with regard to her OTC deficiency.  She has not had significant recent episodes that suggest metabolic decompensation.  She continues to work to accomplish compliance with her prescribed medication and dietary regimen.  She is also continuing to be very " careful about exposure during the global pandemic.     Plan:    1. Testing ordered at this visit:   Orders Placed This Encounter   Procedures     Plasma aminoacids, plasma carnitine, comprehensive metabolic panel, CBC with diff and platelets, prealbumin: Genetics and Metabolism External Order     2. Medications: Continue current medications  3. Continue to observe emergency precautions as previously discussed.  Our on-call metabolic service is available 24 hours/day by calling the page  ([phone]) and asking for the Genetics and Metabolism doctor on call.  4. Return to the Adult Metabolism Clinic in 6 weeks for follow-up.     5.  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  She thought she might have had some diminishment in weight as she was attempting but generally has been working to try to stay active.  Recently, neither she nor been have been able to work much because of the pandemic.  She has previously had problems with her knee which has somewhat improved recently.  She continues to be concerned that she may ultimately need some type of surgical intervention for this.    Her only notable event of concern with regard to her OTC deficiency was that a couple of months ago she had a severe headache.  She went to the emergency department but was found to have a normal ammonia.  About 2 weeks prior to this visit, she experienced nausea.  She backed off on her oral intake, sticking to clear  liquids and broth and this passed after 2-3 days without other overt issues..      Interval History:  Shira was last visited on 3/26/2020 via telephone visit.  Since the last clinic visit Shira was not seen for any urgent clinic visits.  She was not seen in the emergency department.  She currently knows how to contact the 24 hour metabolic specialist on call and has a written emergency letter for this condition.  No hospitalizations occurred.  She had no general anesthesia and no surgical procedures.      Other health services currently received are primary care, gastroenterology  Current research study participation: Longitudinal Study of Urea Cycle Disorders  .           Personal History:  Family History Update:   There was no new family history information elicited at this visit  Family History   Problem Relation Age of Onset     Genetic Disorder Mother         OTC deficiency     Genetic Disorder Brother         OTC deficiency     Lives with , Eran.  She is staying in most of the time.  She is trying as much as possible to help her mother and father as her father has severe chronic respiratory challenges and had been seriously ill this summer.  Current insurance status state/federal program (Medicaid/Medicare).      I have reviewed Shira s past medical history, family history, social history, medications and allergies as documented in the patient's electronic medical record.  There were no additional findings except as noted.      Review of Systems:  Constitional: negative-some issues regarding overweight  Eyes: negative - normal vision with corrective lenses  Ears/Nose/Throat: negative - normal hearing  Respiratory: negative  Cardiovascular: On atenolol  Gastrointestinal: IBS, takes glycopyrolate which is helpful  Genitourinary: negative  Hematologic/Lymphatic: negative  Allergy/Immunologic: negative - no drug allergies  Musculoskeletal: See HPI for details on her knee pain.  Endocrine:  negative  Integument: negative  Neurologic: intellectual disability  Psychiatric: Anxiety    Medications:  Current Outpatient Medications   Medication Sig Dispense Refill     atenolol (TENORMIN) 50 MG tablet Take 50 mg by mouth daily       calcium-vitamin D (CALCIUM 600 + D) 600-400 MG-UNIT per tablet Take 1 tablet by mouth 2 times daily (with meals). 60 tablet 0     glycerol Phenylbutyrate (RAVICTI) 1.1 GM/ML solution Take 3.4 mLs (3.74 g) by mouth 3 times daily with food 306 mL 11     glycopyrrolate (ROBINUL) 0.4 MG/2ML injection Inject 2 mg into the vein 3 times daily       ibuprofen (ADVIL,MOTRIN) 400 MG tablet Take 400 mg by mouth every 6 hours as needed for moderate pain       l-citrulline POWD Take 1.8 g by mouth 4 times daily 250 g 12     levOCARNitine (CARNITOR) 1 GM/10ML solution Take 5 mLs (500 mg) by mouth 3 times daily 450 mL 11     Multiple Vitamins-Iron (MULTIVITAMIN  /IRON) TABS Take 1 tablet daily.       omeprazole (PRILOSEC) 20 MG capsule Take by mouth daily       ondansetron (ZOFRAN-ODT) 4 MG disintegrating tablet Take by mouth every 12 hours as needed for nausea       Sertraline HCl (ZOLOFT PO) Take 50 mg by mouth daily       Allergies:  Allergies   Allergen Reactions     Compazine [Prochlorperazine]      Erythromycin      Msg [Monosodium Glutamate]       Physical Examination:  No exam: Telephone visit  -----------------  CHUN RODRIGUEZ M.D., FAAP, FACMG  Professor  Division of Genetics and Metabolism  Department of Pediatrics  [facility]    Routed to patient in Comm Mgt  Also to Alberto Lewis

## 2020-11-16 NOTE — PROGRESS NOTES
"..Shira William is a 39 year old female who is being evaluated via a billable telephone visit.      The patient has been notified of following:     \"This telephone visit will be conducted via a call between you and your physician/provider. We have found that certain health care needs can be provided without the need for a physical exam.  This service lets us provide the care you need with a short phone conversation.  If a prescription is necessary we can send it directly to your pharmacy.  If lab work is needed we can place an order for that and you can then stop by our lab to have the test done at a later time.    Telephone visits are billed at different rates depending on your insurance coverage. During this emergency period, for some insurers they may be billed the same as an in-person visit.  Please reach out to your insurance provider with any questions.    If during the course of the call the physician/provider feels a telephone visit is not appropriate, you will not be charged for this service.\"    Patient has given verbal consent for Telephone visit?  YES    What phone number would you like to be contacted at?     How would you like to obtain your AVS? Mail a copy    Phone call duration: 30 minutes    CLINICAL NUTRITION SERVICES - PEDIATRIC ASSESSMENT NOTE      REASON FOR ASSESSMENT  Shira William is a 39 year old female seen by the dietitian for consult regarding OTC deficiency.      ANTHROPOMETRICS  Previous anthros from 2019  Height: 65 in  Weight: 104.5 kg or 230 lbs  BMI: 38.4  Ideal body weight: 55 kg  Adjusted body weight: 80 kg      Comments: patient has not weighed herself recently so unsure where her weight is at.   She was feeling very discouraged after she got down to 228 lbs but at an orthopedic appointment this was not seen as an accomplishment.      NUTRITION HISTORY  Patient is on a protein-restricted diet of 40-45 g/day.      Usual intake/recall:     Breakfast: breakfast sandwich " from [employer] or 1 egg + 1 slice toast  Lunch: peanut butter/jeally sandwich  Dinners: last night was chicken noodle soup + crackers.  Other recent dinners were spaghetti + meat sauce, brats or hamburgers (1 each)  Snacks: potato chips  Beverages: Bubly or Zeeshan, water     -has membership at [employer], but is a bit anxious to go with COVID concerns  - one reported ED visit with a slightly elevated ammonia over past 6 months (patient had migraine, was <100 so not admitted)  -patient was recommended by ortho to lose 50-60 lbs before having a knee surgery    Current protein goal of 40-45 gm/day providing 0.8 g/kg IBW, 0.6 g/kg ABW.    LABS  Labs reviewed; no labs, televisit  -Amino acids taken 9/2019 were WNL. -Glutamine WNL.     MEDICATIONS  Medications reviewed;   -MVI daily  -calcium/Vit D (600/400) BID  -3.4 mL Ravicti TID  -1.8 gm citrulline four times daily      ASSESSED NUTRITION NEEDS:  Estimated Energy Needs: 20-25 kcal/kg using ABW (8928-4649 kcals/day)  Estimated Protein Needs: RDA for age = 0.8 g/kg, UCD/age range 0.5-1 g/kg using ABW  Micronutrient Needs: 1200 mg calcium, 600 IU Vitamin D, 18 mg iron      NUTRITION DIAGNOSIS:  Impaired nutrient utilization related to diagnosis of OTC deficiency as evidenced by risk of hyperammonemia with stress/illness, catabolism, or very high protein intake.      Obesity related to excessive calorie intake and lack of exercise as evidenced by BMI in obese range.      INTERVENTIONS  Nutrition Prescription  Meet 100% estimated kcal, protein, amino acid, vitamin/mineral needs through low protein diet    Nutrition Education:   Discussed and reviewed continuing on low protein diet.     Reviewed weight changes, intake, and most recent labs.  -Patient feeling very discouraged with weight loss goals at time of visit.  We spent time discussing that her most recent weight loss of ~20 lbs is not an easy accomplishment and she should feel proud of this and use it as  evidence she has what it takes to lose weight.  Recommended she focus on the benefits that weight loss will bring her (more energy, improved mood, easier moving around, improvement in overall health) instead of focusing on the lack of reaction she got from others that did not recognize this achievement.  -We discussed re-focusing on her weight loss goals with cutting back on going out to eat or getting meals from the gas station.  We also discussed increasing vegetable intake by pre-planning what vegetables she will eat at dinners before going grocery shopping and buying them so they are available.  We also discussed NOT buying and keeping out of the house junk foods such as chips and other items that are easy to overeat on.  Continue to avoid pop as she has been doing for awhile.  Work at portion control at meals and consider logging intake in nutrition nidia such as [employer].  -Take advantage of warm weather days and go on walks. Try to do other activity online such as youtube videos of workouts.  Increase activity as much as possible in ways she feels comfortable doing with the current restrictions.      Goals  1. Gradual weight loss/lower BMI  -unable to assess with no updated anthros  2. Meet >85% estimated nutrition needs through low protein diet  -goal met  3. Ammonia, plasma amino acids, protein status labs WNL  -unable to assess with no recent labs    FOLLOW UP/MONITORING  Energy Intake  Macronutrient intake  Anthropometric measurements    Jenni Joyner, RAISA, CSP, LD

## 2020-12-31 ENCOUNTER — TELEPHONE (OUTPATIENT)
Dept: CONSULT | Facility: CLINIC | Age: 40
End: 2020-12-31

## 2021-01-08 ENCOUNTER — TELEPHONE (OUTPATIENT)
Dept: ENDOCRINOLOGY | Facility: CLINIC | Age: 41
End: 2021-01-08

## 2021-01-08 NOTE — TELEPHONE ENCOUNTER
1/08/2021 @ 5:20 pm-        [facility] annual PA initiated for patient. Verbally answered questions from representative. Indicated Buphenyl was previously tried and failed medication. Representative indicated no further questions and case forwarded to clinical for review. Case number for PA is [id number]. Representative indicated could be up to 72 hours for determination.

## 2021-01-11 NOTE — TELEPHONE ENCOUNTER
1/11/2021 @ 11:15 am-        Faxed approval from patient's Ravicti received and approval of coverage authorized from 1/01/2021-1/08/2022.

## 2021-01-11 NOTE — TELEPHONE ENCOUNTER
Prior Authorization Approval    Authorization Effective Date: 1/1/2021  Authorization Expiration Date: 1/8/2022  Medication: Ravicti- Approved  Approved Dose/Quantity: 300mg  Reference #: [id number]   Insurance Company: [employer] - Phone [phone] Fax [phone]  Expected CoPay:       CoPay Card Available:      Foundation Assistance Needed:    Which Pharmacy is filling the prescription (Not needed for infusion/clinic administered): [facility] [address] 5-899  Pharmacy Notified:    Patient Notified:

## 2021-05-01 ENCOUNTER — TELEPHONE (OUTPATIENT)
Dept: PEDIATRICS | Facility: CLINIC | Age: 41
End: 2021-05-01

## 2021-05-01 NOTE — TELEPHONE ENCOUNTER
Received a page from Dr. Douglas at [facility] informing me that Shira presented to the ER with nausea and vomiting. She had visited her nephew with V&D and she believes she picked up the same bug. She had loose stools x 10 since yesterday. She took zofran yesterday for vomiting and no vomiting since then. She has mild nausea today.    On arrival, recommended labs were drawn including CBC, CMP, ammonia which came back normal. Her UA showed mild leuk esterase. She was given NS 1L bolus and is on D10 NS@ 1.5 MIVF. Dr. Douglas called me to inform me that after being on this fluids for some hours, Shira reports being back at baseline without any N& V and taking good PO.     He plans on sending her home later today. I agreed to the plan. Recommended Shira contact on call genetics if there is any change in her clinical status.    Opal Guerrero MD    Genetics and Metabolism  Pager: [phone]

## 2021-08-26 ENCOUNTER — TELEPHONE (OUTPATIENT)
Dept: ENDOCRINOLOGY | Facility: CLINIC | Age: 41
End: 2021-08-26

## 2021-08-26 NOTE — TELEPHONE ENCOUNTER
8/26/2021 @ 4 pm-         Patient fell and hit head ~ 2 weeks ago and had potential concussion. Reportedly had CT scan, which reportedly did not reveal brain bleed. Patient concerned because continuing to need to take Tylenol and Ibuprofen prn for continued headaches and zofran prn for continued nausea/dizziness. Patient reportedly taking Tylenol or Ibuprofen 2 x/day and Zofran 1-2 x/day. Reviewed with patient to follow Dr. Lewis's directions regarding concussion treatment. Discussed that taking tylenol or ibuprofen as she is currently (frequency of ~ 2 times/day) would still be fine related to her OTC deficiency. Discussed using Zofran prn as well is okay. Reviewed with patient that if her symptoms increase (ie, headaches/nausea increase in intensity, duration or frequency during day) she should go in and see Dr. Lewis or ED if needed. Discussed if her symptoms continue to persist, however, in same intensity, she should plan to reach out to PCP. Patient verbalized understanding.    Sharyn Mattson, AVA, CNP  Adult & Pediatric Genetics/Metabolism  [facility]  Direct phone: [phone]

## 2021-09-29 ENCOUNTER — TELEPHONE (OUTPATIENT)
Dept: ENDOCRINOLOGY | Facility: CLINIC | Age: 41
End: 2021-09-29
Payer: MEDICARE

## 2021-09-29 DIAGNOSIS — E72.4 OTC (ORNITHINE TRANSCARBAMYLASE DEFICIENCY) (H): ICD-10-CM

## 2021-12-20 DIAGNOSIS — E72.4 OTC (ORNITHINE TRANSCARBAMYLASE DEFICIENCY) (H): ICD-10-CM

## 2021-12-20 RX ORDER — CITRULLINE
1.8 POWDER (GRAM) MISCELLANEOUS 4 TIMES DAILY
Qty: 250 G | Refills: 12 | Status: SHIPPED | OUTPATIENT
Start: 2021-12-20

## 2022-01-07 ENCOUNTER — TELEPHONE (OUTPATIENT)
Dept: ENDOCRINOLOGY | Facility: CLINIC | Age: 42
End: 2022-01-07
Payer: MEDICARE

## 2022-01-07 NOTE — TELEPHONE ENCOUNTER
1/07/2022 @ 1:50 pm-        [facility] annual PA initiated for patient. Verbally answered questions from representative. Indicated Buphenyl/Sodium phenylbutyrate was previously tried and failed medication. Representative indicated no further questions and representative noted to writer that prior authorization is approved. Case number for PA is [id number] and approved from 1/08/2022 - 1/07/2023. Faxed approval will be sent to our office.     AVA Moralez, CNP  Pediatric Genetics/Metabolism  [facility]  Direct phone: [phone]

## 2022-06-21 DIAGNOSIS — E72.4 OTC (ORNITHINE TRANSCARBAMYLASE DEFICIENCY) (H): Primary | ICD-10-CM

## 2022-06-23 ENCOUNTER — LAB (OUTPATIENT)
Dept: LAB | Facility: CLINIC | Age: 42
End: 2022-06-23

## 2022-06-23 ENCOUNTER — ALLIED HEALTH/NURSE VISIT (OUTPATIENT)
Dept: PEDIATRICS | Facility: CLINIC | Age: 42
End: 2022-06-23
Attending: MEDICAL GENETICS
Payer: MEDICARE

## 2022-06-23 ENCOUNTER — OFFICE VISIT (OUTPATIENT)
Dept: ENDOCRINOLOGY | Facility: CLINIC | Age: 42
End: 2022-06-23
Payer: MEDICARE

## 2022-06-23 VITALS
OXYGEN SATURATION: 97 % | TEMPERATURE: 99 F | DIASTOLIC BLOOD PRESSURE: 85 MMHG | WEIGHT: 239.1 LBS | BODY MASS INDEX: 39.83 KG/M2 | SYSTOLIC BLOOD PRESSURE: 124 MMHG | HEART RATE: 90 BPM | HEIGHT: 65 IN

## 2022-06-23 DIAGNOSIS — E72.4 OTC (ORNITHINE TRANSCARBAMYLASE DEFICIENCY) (H): Primary | ICD-10-CM

## 2022-06-23 DIAGNOSIS — E72.4 OTC (ORNITHINE TRANSCARBAMYLASE DEFICIENCY) (H): ICD-10-CM

## 2022-06-23 LAB
ALBUMIN SERPL-MCNC: 3.6 G/DL (ref 3.4–5)
ALP SERPL-CCNC: 64 U/L (ref 40–150)
ALT SERPL W P-5'-P-CCNC: 29 U/L (ref 0–50)
ANION GAP SERPL CALCULATED.3IONS-SCNC: 8 MMOL/L (ref 3–14)
AST SERPL W P-5'-P-CCNC: 14 U/L (ref 0–45)
BASOPHILS # BLD AUTO: 0 10E3/UL (ref 0–0.2)
BASOPHILS NFR BLD AUTO: 1 %
BILIRUB SERPL-MCNC: 0.6 MG/DL (ref 0.2–1.3)
BUN SERPL-MCNC: 13 MG/DL (ref 7–30)
CALCIUM SERPL-MCNC: 9.2 MG/DL (ref 8.5–10.1)
CHLORIDE BLD-SCNC: 108 MMOL/L (ref 94–109)
CO2 SERPL-SCNC: 26 MMOL/L (ref 20–32)
CREAT SERPL-MCNC: 0.73 MG/DL (ref 0.52–1.04)
EOSINOPHIL # BLD AUTO: 0.1 10E3/UL (ref 0–0.7)
EOSINOPHIL NFR BLD AUTO: 2 %
ERYTHROCYTE [DISTWIDTH] IN BLOOD BY AUTOMATED COUNT: 13 % (ref 10–15)
GFR SERPL CREATININE-BSD FRML MDRD: >90 ML/MIN/1.73M2
GLUCOSE BLD-MCNC: 96 MG/DL (ref 70–99)
HCT VFR BLD AUTO: 40.8 % (ref 35–47)
HGB BLD-MCNC: 13.4 G/DL (ref 11.7–15.7)
IMM GRANULOCYTES # BLD: 0 10E3/UL
IMM GRANULOCYTES NFR BLD: 0 %
LYMPHOCYTES # BLD AUTO: 2.4 10E3/UL (ref 0.8–5.3)
LYMPHOCYTES NFR BLD AUTO: 40 %
MCH RBC QN AUTO: 28.1 PG (ref 26.5–33)
MCHC RBC AUTO-ENTMCNC: 32.8 G/DL (ref 31.5–36.5)
MCV RBC AUTO: 86 FL (ref 78–100)
MONOCYTES # BLD AUTO: 0.2 10E3/UL (ref 0–1.3)
MONOCYTES NFR BLD AUTO: 4 %
NEUTROPHILS # BLD AUTO: 3.2 10E3/UL (ref 1.6–8.3)
NEUTROPHILS NFR BLD AUTO: 53 %
NRBC # BLD AUTO: 0 10E3/UL
NRBC BLD AUTO-RTO: 0 /100
PLATELET # BLD AUTO: 212 10E3/UL (ref 150–450)
POTASSIUM BLD-SCNC: 3.7 MMOL/L (ref 3.4–5.3)
PREALB SERPL IA-MCNC: 20 MG/DL (ref 15–45)
PROT SERPL-MCNC: 7.5 G/DL (ref 6.8–8.8)
RBC # BLD AUTO: 4.77 10E6/UL (ref 3.8–5.2)
SODIUM SERPL-SCNC: 142 MMOL/L (ref 133–144)
WBC # BLD AUTO: 6 10E3/UL (ref 4–11)

## 2022-06-23 PROCEDURE — 36415 COLL VENOUS BLD VENIPUNCTURE: CPT | Performed by: PATHOLOGY

## 2022-06-23 PROCEDURE — 99000 SPECIMEN HANDLING OFFICE-LAB: CPT | Performed by: PATHOLOGY

## 2022-06-23 PROCEDURE — 84134 ASSAY OF PREALBUMIN: CPT | Performed by: PATHOLOGY

## 2022-06-23 PROCEDURE — 82139 AMINO ACIDS QUAN 6 OR MORE: CPT | Performed by: MEDICAL GENETICS

## 2022-06-23 PROCEDURE — 85025 COMPLETE CBC W/AUTO DIFF WBC: CPT | Performed by: PATHOLOGY

## 2022-06-23 PROCEDURE — 97803 MED NUTRITION INDIV SUBSEQ: CPT | Performed by: DIETITIAN, REGISTERED

## 2022-06-23 PROCEDURE — 99215 OFFICE O/P EST HI 40 MIN: CPT | Performed by: MEDICAL GENETICS

## 2022-06-23 PROCEDURE — 80053 COMPREHEN METABOLIC PANEL: CPT | Performed by: PATHOLOGY

## 2022-06-23 ASSESSMENT — PAIN SCALES - GENERAL: PAINLEVEL: NO PAIN (0)

## 2022-06-23 ASSESSMENT — PATIENT HEALTH QUESTIONNAIRE - PHQ9: SUM OF ALL RESPONSES TO PHQ QUESTIONS 1-9: 10

## 2022-06-23 NOTE — LETTER
2022      RE: Shira William  [address]  [address]  [place] [address]     Adult Metabolism Clinic Return Visit  Name:  Shira William  :   1980  MRN:   [id number]  Date of Visit: 2022  PCP: Alex Lewis  Managing Metabolic Center(s):  [facility].    Chief Complaint:  Ms. Shira William is a 41 year old female whom I saw in follow up in Metabolism Clinic for OTC (ornithine transcarbamylase) deficiency. She also saw our dietitian and nurse coordinator at this visit.     Assessment:      Shira cat has been generally clinically stable since last being seen without an episode that suggests metabolic decompensation despite having some issues with diarrhea sufficient to necessitate an emergency department visit. She has had other health issues (hand pain, back pain) but these are not directly related to her metabolic condition. Adults with OTC deficiency may develop hepatic dysfunction even without significant liver enzyme elevation. We will do a screening hepatic ultrasound to assess this.     Plan:    1. Testing ordered at this visit:   No orders of the defined types were placed in this encounter.  Lab testing was drawn prior to this visit-see below  Results are as noted, below. Additional recommendations based on these laboratory results:  Normal plasma glutamine and alanine suggest that her plasma ammonia is generally normal. She has a plasma protein profile consistent with protein sufficiency. Previously on carnitine supplementation, she was currently not taking it with a normal plasma free carnitine. Liver enzyme tests, kidney function tests, and blood counts were normal.  2. Medications: Continue current medications.  Jenni indicated that she had not been taking her carnitine on a regular basis.  As her current plasma free carnitine is in the normal range, she can delay restarting and we will monitor this  level.  3. Metabolic dietician follow up with Jenni Ar, RD, LD at this visit to review special dietary concerns. Metabolic diet should be continued as prescribed  4. Continue to observe emergency precautions as previously discussed.  Our on-call metabolic service is available 24 hours/day by calling the page  ([phone]) and asking for the Genetics and Metabolism doctor on call.  5. Return to the Adult Metabolism Clinic in 6 months for follow-up.     6.  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 She has had some problems with hand pain and back pain. She hasn't had any episodes of illness. She is trying to get out and do some additional walking.       Interval History:  Shira was last seen in our clinic on 10/22/2020 (virtual).  Since the last clinic visit Shira was not seen for any urgent clinic visits.  She was seen in the emergency department twice, once for a lump on her scalp and the other time for diarrhea.  She currently knows how to contact the 24 hour metabolic specialist on call and has a written emergency letter for this condition.  No hospitalizations occurred.  She had no general anesthesia and no surgical procedures.      Other health services currently received are primary care  Current research study participation: Longitudinal Study of Urea Cycle Disorders    Personal History:  Family History Update:   There was no new family history information elicited at this  visit.    Lives in her apartment, sees her mother frequently.  Her  is currently in a treatment program until  August.  Current insurance status state/federal program (Medicaid/Medicare).      I have reviewed Shira s past medical history, family history, social history, medications and allergies as documented in the patient's electronic medical record.  There were no additional findings except as noted.     Review of Systems:   Constitional: negative  Eyes: negative - normal vision with corrective lenses  Ears/Nose/Throat: negative - normal hearing  Respiratory: negative  Cardiovascular: On atenolol  Gastrointestinal: IBS, takes glycopyrolate which is helpful  Genitourinary: negative  Hematologic/Lymphatic: negative  Allergy/Immunologic: negative - no drug allergies  Musculoskeletal: See HPI for details on her knee pain.  Endocrine: negative  Integument: negative  Neurologic: intellectual disability  Psychiatric: anxiety. Is receiving therapy    Medications:  Current Outpatient Medications   Medication Sig Dispense Refill     atenolol (TENORMIN) 50 MG tablet Take 50 mg by mouth daily       calcium-vitamin D (CALCIUM 600 + D) 600-400 MG-UNIT per tablet Take 1 tablet by mouth 2 times daily (with meals). 60 tablet 0     glycerol Phenylbutyrate (RAVICTI) 1.1 GM/ML solution Take 3.4 mLs (3.74 g) by mouth 3 times daily with food 306 mL 11     glycopyrrolate (ROBINUL) 0.4 MG/2ML injection Inject 2 mg into the vein 3 times daily       ibuprofen (ADVIL,MOTRIN) 400 MG tablet Take 400 mg by mouth every 6 hours as needed for moderate pain       l-citrulline POWD Take 1.8 g by mouth 4 times daily 250 g 12     levOCARNitine (CARNITOR) 1 GM/10ML solution Take 5 mLs (500 mg) by mouth 3 times daily 450 mL 11     Multiple Vitamins-Iron (MULTIVITAMIN  /IRON) TABS Take 1 tablet daily.       omeprazole (PRILOSEC) 20 MG capsule Take by mouth daily       ondansetron (ZOFRAN-ODT) 4 MG disintegrating tablet Take by mouth every 12  "hours as needed for nausea       Sertraline HCl (ZOLOFT PO) Take 50 mg by mouth daily       Allergies:  Allergies   Allergen Reactions     Compazine [Prochlorperazine]      Erythromycin      Msg [Monosodium Glutamate]      Physical Examination:  Blood pressure 124/85, pulse 90, temperature 99  F (37.2  C), temperature source Oral, height 5' 5\" (165.1 cm), weight 239 lb 1.6 oz (108.5 kg), last menstrual period 08/01/2013, SpO2 97 %.  Body surface area is 2.23 meters squared.  BMI: Body mass index is 39.79 kg/m .  General: This was a well-developed, well-nourished female who responded appropriately to all requests during the examination.    Head and Neck:  She had hair of normal texture and distribution and her head was proportionate in appearance.The neck was supple without lymphadenopathy.   Eyes:  The pupils were equal, round, and reacted to light.   The conjunctivae were clear.   Ears:  Her ears were normal in architecture and placement.   Nose: The nose was clear.    Mouth and Throat: her dentition was normal.  The throat was without erythema.    Respiratory: The chest was clear to auscultation and had a symmetric appearance.    CV:  On examination of the heart, the rhythm was regular and there was no murmur.    GI: The abdomen was soft and had normal bowel sounds.  There was no hepatosplenomegaly.    : I deferred a  examination.   Musculoskeletal: There was a full range of motion on the extremity exam, and normal muscular volume and bulk.   Neurologic: The neurologic exam was normal, with normal cranial nerves, normal deep tendon reflexes, normal sensation, and a normal gait. She had normal tone.   Integument: The skin was normal with no rashes or unusual pigmentation.    Results of laboratory studies collected at this visit and available when this note was completed:   Results for orders placed or performed in visit on 06/23/22   Amino acids plasma quantitative     Status: Abnormal   Result Value Ref Range "    A-Aminoadipic 1 0 - 6 umol/dL    Alanine 29 22 - 62 umol/dL    Anserine 0 umol/dL    Arginine 5 2 - 18 umol/dL    Asparagine 4 1 - 5 umol/dL    Aspartic Acid 0 0 - 4 umol/dL    B-Alanine Plasma 0 umol/dL    B-Aminoisobutyric 0 umol/dL    Carnosine 0 umol/dL    Citrulline 4.3 1.3 - 6.0 umol/dL    Cystathionine 0 umol/dL    Cystine 10 3 - 15 umol/dL    Glutamic Acid 6 0 - 16 umol/dL    Glutamine 67 41 - 86 umol/dL    Glycine 18 13 - 50 umol/dL    Histidine 7 3 - 15 umol/dL    1-Methylhistidine 3 (H) 0 - 2 umol/dL    3-Methylhistidine 0 0 - 1 umol/dL    Homocysteine umol/dL 0 umol/dL    Hydroxylysine 0 umol/dL    Hydroxyproline 2 0 - 3 umol/dL    Isoleucine 6 4 - 11 umol/dL    Leucine 12 8 - 21 umol/dL    Lysine 16 6 - 26 umol/dL    Methionine 2 1 - 6 umol/dL    Ornithine 8 2 - 16 umol/dL    Phenylalanine umol/dL 4.3 3.0 - 10.0 umol/dL    Proline 17 0 - 48 umol/dL    Sarcosine Plasma 0 umol/dL    Serine 8 4 - 18 umol/dL    Taurine 4 (L) 7 - 32 umol/dL    Threonine 11 5 - 25 umol/dL    Tyrosine umol/dL 5.7 4.0 - 13.0 umol/dL    Valine 23 8 - 46 umol/dL    Amino Acid Plasma Interpretation       Interpretation is not indicated for this specimen.     Prealbumin     Status: Normal   Result Value Ref Range    Prealbumin 20 15 - 45 mg/dL   Carnitine free and total     Status: None   Result Value Ref Range    Carnitine Free 25 25 - 60 umol/L    Carnitine Total 39 34 - 86 umol/L    Carnitine Esterified 14 5 - 29 umol/L    Carnitine Esterified/Free Ratio 0.6 0.1 - 1.0   Comprehensive metabolic panel     Status: Normal   Result Value Ref Range    Sodium 142 133 - 144 mmol/L    Potassium 3.7 3.4 - 5.3 mmol/L    Chloride 108 94 - 109 mmol/L    Carbon Dioxide (CO2) 26 20 - 32 mmol/L    Anion Gap 8 3 - 14 mmol/L    Urea Nitrogen 13 7 - 30 mg/dL    Creatinine 0.73 0.52 - 1.04 mg/dL    Calcium 9.2 8.5 - 10.1 mg/dL    Glucose 96 70 - 99 mg/dL    Alkaline Phosphatase 64 40 - 150 U/L    AST 14 0 - 45 U/L    ALT 29 0 - 50 U/L    Protein  Total 7.5 6.8 - 8.8 g/dL    Albumin 3.6 3.4 - 5.0 g/dL    Bilirubin Total 0.6 0.2 - 1.3 mg/dL    GFR Estimate >90 >60 mL/min/1.73m2   CBC with platelets and differential     Status: None   Result Value Ref Range    WBC Count 6.0 4.0 - 11.0 10e3/uL    RBC Count 4.77 3.80 - 5.20 10e6/uL    Hemoglobin 13.4 11.7 - 15.7 g/dL    Hematocrit 40.8 35.0 - 47.0 %    MCV 86 78 - 100 fL    MCH 28.1 26.5 - 33.0 pg    MCHC 32.8 31.5 - 36.5 g/dL    RDW 13.0 10.0 - 15.0 %    Platelet Count 212 150 - 450 10e3/uL    % Neutrophils 53 %    % Lymphocytes 40 %    % Monocytes 4 %    % Eosinophils 2 %    % Basophils 1 %    % Immature Granulocytes 0 %    NRBCs per 100 WBC 0 <1 /100    Absolute Neutrophils 3.2 1.6 - 8.3 10e3/uL    Absolute Lymphocytes 2.4 0.8 - 5.3 10e3/uL    Absolute Monocytes 0.2 0.0 - 1.3 10e3/uL    Absolute Eosinophils 0.1 0.0 - 0.7 10e3/uL    Absolute Basophils 0.0 0.0 - 0.2 10e3/uL    Absolute Immature Granulocytes 0.0 <=0.4 10e3/uL    Absolute NRBCs 0.0 10e3/uL   CBC with Platelets & Differential     Status: None    Narrative    The following orders were created for panel order CBC with Platelets & Differential.  Procedure                               Abnormality         Status                     ---------                               -----------         ------                     CBC with platelets and d...[[id number]]                      Final result                 Please view results for these tests on the individual orders.     CHUN RODRIGUEZ M.D., FAAP, [facility]  Professor  Division of Genetics and Metabolism  Department of Pediatrics  [facility]    Routed to patient in Comm Mgt  Also to Alex Lewis    45 minutes spent on the date of the encounter doing chart review, history and exam, documentation and further activities per the note

## 2022-06-23 NOTE — PATIENT INSTRUCTIONS
Adult Metabolism Clinic  [facility]     If questions/concerns, feel free to reach our nurse coordinator at the below number or send a [employer] message for any non-urgent questions/concerns.    If any immediate needs because of illness or symptoms, contact the Pediatric Genetic/Metabolic physician on-call via [phone].    Team contact numbers:   RN Care Coordinator: [phone]  Jenni Joyner RD, LD (dietitian): [phone] or [email or web address]  Genetic/Metabolic Physician On-call: [phone]     Scheduling numbers:  General scheduling (Adult Call Center): [phone]                Our staff will make every effort to schedule your follow-up appointment in a timely fashion. If you don't hear from us in the next two weeks, please contact us for this scheduling.

## 2022-06-23 NOTE — PROGRESS NOTES
Adult Metabolism Clinic Return Visit  Name:  Shira William  :   1980  MRN:   [id number]  Date of Visit: 2022  PCP: Alex Lewis  Managing Metabolic Center(s):  [facility] Adult [facility]abolism [facility].    Chief Complaint:  Ms. Shira William is a 41 year old female whom I saw in follow up in Metabolism Clinic for OTC (ornithine transcarbamylase) deficiency. She also saw our dietitian and nurse coordinator at this visit.     Assessment:      Shira cat has been generally clinically stable since last being seen without an episode that suggests metabolic decompensation despite having some issues with diarrhea sufficient to necessitate an emergency department visit. She has had other health issues (hand pain, back pain) but these are not directly related to her metabolic condition. Adults with OTC deficiency may develop hepatic dysfunction even without significant liver enzyme elevation. We will do a screening hepatic ultrasound to assess this.     Plan:    1. Testing ordered at this visit:   No orders of the defined types were placed in this encounter.  Lab testing was drawn prior to this visit-see below  Results are as noted, below. Additional recommendations based on these laboratory results:  Normal plasma glutamine and alanine suggest that her plasma ammonia is generally normal. She has a plasma protein profile consistent with protein sufficiency. Previously on carnitine supplementation, she was currently not taking it with a normal plasma free carnitine. Liver enzyme tests, kidney function tests, and blood counts were normal.  2. Medications: Continue current medications.  Jenni indicated that she had not been taking her carnitine on a regular basis.  As her current plasma free carnitine is in the normal range, she can delay restarting and we will monitor this level.  3. Metabolic dietician follow up with Jenni Joyner, RAISA, LD at this visit to review  special dietary concerns. Metabolic diet should be continued as prescribed  4. Continue to observe emergency precautions as previously discussed.  Our on-call metabolic service is available 24 hours/day by calling the page  ([phone]) and asking for the Genetics and Metabolism doctor on call.  5. Return to the Adult Metabolism Clinic in 6 months for follow-up.     6.  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 She has had some problems with hand pain and back pain. She hasn't had any episodes of illness. She is trying to get out and do some additional walking.       Interval History:  Shira was last seen in our clinic on 10/22/2020 (virtual).  Since the last clinic visit Shira was not seen for any urgent clinic visits.  She was seen in the emergency department twice, once for a lump on her scalp and the other time for diarrhea.  She currently knows how to contact the 24 hour metabolic specialist on call and has a written emergency letter for this condition.  No hospitalizations occurred.  She had no general anesthesia and no surgical procedures.      Other health services currently received are primary care  Current research study participation: Longitudinal Study of Urea Cycle Disorders    Personal History:  Family History Update:   There was no new family history information elicited at this visit.    Lives in her apartment, sees her mother frequently.  Her  is currently in a  treatment program until  August.  Current insurance status state/federal program (Medicaid/Medicare).      I have reviewed Shira s past medical history, family history, social history, medications and allergies as documented in the patient's electronic medical record.  There were no additional findings except as noted.     Review of Systems:   Constitional: negative  Eyes: negative - normal vision with corrective lenses  Ears/Nose/Throat: negative - normal hearing  Respiratory: negative  Cardiovascular: On atenolol  Gastrointestinal: IBS, takes glycopyrolate which is helpful  Genitourinary: negative  Hematologic/Lymphatic: negative  Allergy/Immunologic: negative - no drug allergies  Musculoskeletal: See HPI for details on her knee pain.  Endocrine: negative  Integument: negative  Neurologic: intellectual disability  Psychiatric: anxiety. Is receiving therapy    Medications:  Current Outpatient Medications   Medication Sig Dispense Refill     atenolol (TENORMIN) 50 MG tablet Take 50 mg by mouth daily       calcium-vitamin D (CALCIUM 600 + D) 600-400 MG-UNIT per tablet Take 1 tablet by mouth 2 times daily (with meals). 60 tablet 0     glycerol Phenylbutyrate (RAVICTI) 1.1 GM/ML solution Take 3.4 mLs (3.74 g) by mouth 3 times daily with food 306 mL 11     glycopyrrolate (ROBINUL) 0.4 MG/2ML injection Inject 2 mg into the vein 3 times daily       ibuprofen (ADVIL,MOTRIN) 400 MG tablet Take 400 mg by mouth every 6 hours as needed for moderate pain       l-citrulline POWD Take 1.8 g by mouth 4 times daily 250 g 12     levOCARNitine (CARNITOR) 1 GM/10ML solution Take 5 mLs (500 mg) by mouth 3 times daily 450 mL 11     Multiple Vitamins-Iron (MULTIVITAMIN  /IRON) TABS Take 1 tablet daily.       omeprazole (PRILOSEC) 20 MG capsule Take by mouth daily       ondansetron (ZOFRAN-ODT) 4 MG disintegrating tablet Take by mouth every 12 hours as needed for nausea       Sertraline HCl (ZOLOFT PO) Take 50 mg by mouth daily    "    Allergies:  Allergies   Allergen Reactions     Compazine [Prochlorperazine]      Erythromycin      Msg [Monosodium Glutamate]      Physical Examination:  Blood pressure 124/85, pulse 90, temperature 99  F (37.2  C), temperature source Oral, height 5' 5\" (165.1 cm), weight 239 lb 1.6 oz (108.5 kg), last menstrual period 08/01/2013, SpO2 97 %.  Body surface area is 2.23 meters squared.  BMI: Body mass index is 39.79 kg/m .  General: This was a well-developed, well-nourished female who responded appropriately to all requests during the examination.    Head and Neck:  She had hair of normal texture and distribution and her head was proportionate in appearance.The neck was supple without lymphadenopathy.   Eyes:  The pupils were equal, round, and reacted to light.   The conjunctivae were clear.   Ears:  Her ears were normal in architecture and placement.   Nose: The nose was clear.    Mouth and Throat: her dentition was normal.  The throat was without erythema.    Respiratory: The chest was clear to auscultation and had a symmetric appearance.    CV:  On examination of the heart, the rhythm was regular and there was no murmur.    GI: The abdomen was soft and had normal bowel sounds.  There was no hepatosplenomegaly.    : I deferred a  examination.   Musculoskeletal: There was a full range of motion on the extremity exam, and normal muscular volume and bulk.   Neurologic: The neurologic exam was normal, with normal cranial nerves, normal deep tendon reflexes, normal sensation, and a normal gait. She had normal tone.   Integument: The skin was normal with no rashes or unusual pigmentation.    Results of laboratory studies collected at this visit and available when this note was completed:   Results for orders placed or performed in visit on 06/23/22   Amino acids plasma quantitative     Status: Abnormal   Result Value Ref Range    A-Aminoadipic 1 0 - 6 umol/dL    Alanine 29 22 - 62 umol/dL    Anserine 0 umol/dL    " Arginine 5 2 - 18 umol/dL    Asparagine 4 1 - 5 umol/dL    Aspartic Acid 0 0 - 4 umol/dL    B-Alanine Plasma 0 umol/dL    B-Aminoisobutyric 0 umol/dL    Carnosine 0 umol/dL    Citrulline 4.3 1.3 - 6.0 umol/dL    Cystathionine 0 umol/dL    Cystine 10 3 - 15 umol/dL    Glutamic Acid 6 0 - 16 umol/dL    Glutamine 67 41 - 86 umol/dL    Glycine 18 13 - 50 umol/dL    Histidine 7 3 - 15 umol/dL    1-Methylhistidine 3 (H) 0 - 2 umol/dL    3-Methylhistidine 0 0 - 1 umol/dL    Homocysteine umol/dL 0 umol/dL    Hydroxylysine 0 umol/dL    Hydroxyproline 2 0 - 3 umol/dL    Isoleucine 6 4 - 11 umol/dL    Leucine 12 8 - 21 umol/dL    Lysine 16 6 - 26 umol/dL    Methionine 2 1 - 6 umol/dL    Ornithine 8 2 - 16 umol/dL    Phenylalanine umol/dL 4.3 3.0 - 10.0 umol/dL    Proline 17 0 - 48 umol/dL    Sarcosine Plasma 0 umol/dL    Serine 8 4 - 18 umol/dL    Taurine 4 (L) 7 - 32 umol/dL    Threonine 11 5 - 25 umol/dL    Tyrosine umol/dL 5.7 4.0 - 13.0 umol/dL    Valine 23 8 - 46 umol/dL    Amino Acid Plasma Interpretation       Interpretation is not indicated for this specimen.     Prealbumin     Status: Normal   Result Value Ref Range    Prealbumin 20 15 - 45 mg/dL   Carnitine free and total     Status: None   Result Value Ref Range    Carnitine Free 25 25 - 60 umol/L    Carnitine Total 39 34 - 86 umol/L    Carnitine Esterified 14 5 - 29 umol/L    Carnitine Esterified/Free Ratio 0.6 0.1 - 1.0   Comprehensive metabolic panel     Status: Normal   Result Value Ref Range    Sodium 142 133 - 144 mmol/L    Potassium 3.7 3.4 - 5.3 mmol/L    Chloride 108 94 - 109 mmol/L    Carbon Dioxide (CO2) 26 20 - 32 mmol/L    Anion Gap 8 3 - 14 mmol/L    Urea Nitrogen 13 7 - 30 mg/dL    Creatinine 0.73 0.52 - 1.04 mg/dL    Calcium 9.2 8.5 - 10.1 mg/dL    Glucose 96 70 - 99 mg/dL    Alkaline Phosphatase 64 40 - 150 U/L    AST 14 0 - 45 U/L    ALT 29 0 - 50 U/L    Protein Total 7.5 6.8 - 8.8 g/dL    Albumin 3.6 3.4 - 5.0 g/dL    Bilirubin Total 0.6 0.2 - 1.3  mg/dL    GFR Estimate >90 >60 mL/min/1.73m2   CBC with platelets and differential     Status: None   Result Value Ref Range    WBC Count 6.0 4.0 - 11.0 10e3/uL    RBC Count 4.77 3.80 - 5.20 10e6/uL    Hemoglobin 13.4 11.7 - 15.7 g/dL    Hematocrit 40.8 35.0 - 47.0 %    MCV 86 78 - 100 fL    MCH 28.1 26.5 - 33.0 pg    MCHC 32.8 31.5 - 36.5 g/dL    RDW 13.0 10.0 - 15.0 %    Platelet Count 212 150 - 450 10e3/uL    % Neutrophils 53 %    % Lymphocytes 40 %    % Monocytes 4 %    % Eosinophils 2 %    % Basophils 1 %    % Immature Granulocytes 0 %    NRBCs per 100 WBC 0 <1 /100    Absolute Neutrophils 3.2 1.6 - 8.3 10e3/uL    Absolute Lymphocytes 2.4 0.8 - 5.3 10e3/uL    Absolute Monocytes 0.2 0.0 - 1.3 10e3/uL    Absolute Eosinophils 0.1 0.0 - 0.7 10e3/uL    Absolute Basophils 0.0 0.0 - 0.2 10e3/uL    Absolute Immature Granulocytes 0.0 <=0.4 10e3/uL    Absolute NRBCs 0.0 10e3/uL   CBC with Platelets & Differential     Status: None    Narrative    The following orders were created for panel order CBC with Platelets & Differential.  Procedure                               Abnormality         Status                     ---------                               -----------         ------                     CBC with platelets and d...[[id number]]                      Final result                 Please view results for these tests on the individual orders.     CHUN RODRIGUEZ M.D., FAAP, FAC  Professor  Division of Genetics and Metabolism  Department of Pediatrics  [facility]    Routed to patient in Comm Mgt  Also to Alex Lewis    45 minutes spent on the date of the encounter doing chart review, history and exam, documentation and further activities per the note

## 2022-06-23 NOTE — Clinical Note
2022       RE: Shira William  [address]  [address]  [place] [address]     Dear Colleague,    Thank you for referring your patient, Shira William, to the [facility] METABOLIC DISORDERS CLINIC [place] at [facility]. Please see a copy of my visit note below.    Adult Metabolism Clinic Return Visit  Name:  Shira William  :   1980  MRN:   [id number]  Date of Visit: 2022  PCP: Alex Lewis  Managing Metabolic Center(s):  [facility] Adult Metabolism Clinic.    Chief Complaint:  Ms. Shira William is a 41 year old female whom I saw in follow up in Metabolism Clinic for OTC (ornithine transcarbamylase) deficiency. She also saw our dietitian and nurse coordinator at this visit.     Assessment:      Shira cat has been generally clinically stable since last being seen without an episode that suggests metabolic decompensation despite having some issues with diarrhea sufficient to necessitate an emergency department visit. She has had other health issues (hand pain, back pain) but these are not directly related to her metabolic condition. Adults with OGC deficiency may develop hepatic dysfunction even without significant liver enzyme elevation. We will do a screening hepatic ultrasound to assess this.     Plan:    1. Testing ordered at this visit:   No orders of the defined types were placed in this encounter.  Lab testing was drawn prior to this visit-see below  Results are as noted, below. Additional recommendations based on these laboratory results:  Normal plasma glutamine and alanine suggest that her plasma ammonia is generally normal. She has a plasma protein profile consistent with protein sufficiency. Previously on carnitine supplementation, she was currently not taking it with a normal plasma free carnitine. Liver enzyme tests, kidney function tests, and blood counts were  normal.  2. Medications: Continue current medications.  Jenni indicated that she had not been taking her carnitine on a regular basis.  As her current plasma free carnitine is in the normal range, she can delay restarting and we will monitor this level.  3. Metabolic dietician follow up with Jenni Ar, RD, LD at this visit to review special dietary concerns. Metabolic diet should be continued as prescribed  4. Continue to observe emergency precautions as previously discussed.  Our on-call metabolic service is available 24 hours/day by calling the page  ([phone]) and asking for the Genetics and Metabolism doctor on call.  5. Return to the Adult Metabolism Clinic in 6 months for follow-up.     6.  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 She has had some problems with hand pain and back pain. She hasn't had any episodes of illness. She is trying to get out and do some additional walking.       Interval History:  Shira was last seen in our clinic on 10/22/2020 (virtual).  Since the last clinic visit Shira was not seen for any urgent clinic visits.  She was seen in the emergency department twice, once for a lump on her scalp and the other time for diarrhea.  She currently knows how to contact the 24 hour metabolic specialist on call and has a written emergency letter for this condition.  No hospitalizations occurred.  She had no general anesthesia and no surgical procedures.      Other  health services currently received are primary care  Current research study participation: Longitudinal Study of Urea Cycle Disorders    Personal History:  Family History Update:   There was no new family history information elicited at this visit.    Lives in her apartment, sees her mother frequently.  Her  is currently in a treatment program until  August.  Current insurance status state/federal program (Medicaid/Medicare).      I have reviewed Shira s past medical history, family history, social history, medications and allergies as documented in the patient's electronic medical record.  There were no additional findings except as noted.     Review of Systems:   Constitional: negative  Eyes: negative - normal vision with corrective lenses  Ears/Nose/Throat: negative - normal hearing  Respiratory: negative  Cardiovascular: On atenolol  Gastrointestinal: IBS, takes glycopyrolate which is helpful  Genitourinary: negative  Hematologic/Lymphatic: negative  Allergy/Immunologic: negative - no drug allergies  Musculoskeletal: See HPI for details on her knee pain.  Endocrine: negative  Integument: negative  Neurologic: intellectual disability  Psychiatric: anxiety. Is receiving therapy    Medications:  Current Outpatient Medications   Medication Sig Dispense Refill     atenolol (TENORMIN) 50 MG tablet Take 50 mg by mouth daily       calcium-vitamin D (CALCIUM 600 + D) 600-400 MG-UNIT per tablet Take 1 tablet by mouth 2 times daily (with meals). 60 tablet 0     glycerol Phenylbutyrate (RAVICTI) 1.1 GM/ML solution Take 3.4 mLs (3.74 g) by mouth 3 times daily with food 306 mL 11     glycopyrrolate (ROBINUL) 0.4 MG/2ML injection Inject 2 mg into the vein 3 times daily       ibuprofen (ADVIL,MOTRIN) 400 MG tablet Take 400 mg by mouth every 6 hours as needed for moderate pain       l-citrulline POWD Take 1.8 g by mouth 4 times daily 250 g 12     levOCARNitine (CARNITOR) 1 GM/10ML solution Take 5 mLs (500 mg) by mouth  "3 times daily 450 mL 11     Multiple Vitamins-Iron (MULTIVITAMIN  /IRON) TABS Take 1 tablet daily.       omeprazole (PRILOSEC) 20 MG capsule Take by mouth daily       ondansetron (ZOFRAN-ODT) 4 MG disintegrating tablet Take by mouth every 12 hours as needed for nausea       Sertraline HCl (ZOLOFT PO) Take 50 mg by mouth daily       Allergies:  Allergies   Allergen Reactions     Compazine [Prochlorperazine]      Erythromycin      Msg [Monosodium Glutamate]      Physical Examination:  Blood pressure 124/85, pulse 90, temperature 99  F (37.2  C), temperature source Oral, height 5' 5\" (165.1 cm), weight 239 lb 1.6 oz (108.5 kg), last menstrual period 08/01/2013, SpO2 97 %.  Body surface area is 2.23 meters squared.  BMI: Body mass index is 39.79 kg/m .  General: This was a well-developed, well-nourished female who responded appropriately to all requests during the examination.    Head and Neck:  She had hair of normal texture and distribution and her head was proportionate in appearance.The neck was supple without lymphadenopathy.   Eyes:  The pupils were equal, round, and reacted to light.   The conjunctivae were clear.   Ears:  Her ears were normal in architecture and placement.   Nose: The nose was clear.    Mouth and Throat: her dentition was normal.  The throat was without erythema.    Respiratory: The chest was clear to auscultation and had a symmetric appearance.    CV:  On examination of the heart, the rhythm was regular and there was no murmur.    GI: The abdomen was soft and had normal bowel sounds.  There was no hepatosplenomegaly.    : I deferred a  examination.   Musculoskeletal: There was a full range of motion on the extremity exam, and normal muscular volume and bulk.   Neurologic: The neurologic exam was normal, with normal cranial nerves, normal deep tendon reflexes, normal sensation, and a normal gait. She had normal tone.   Integument: The skin was normal with no rashes or unusual " pigmentation.    Results of laboratory studies collected at this visit and available when this note was completed:   Results for orders placed or performed in visit on 06/23/22   Amino acids plasma quantitative     Status: Abnormal   Result Value Ref Range    A-Aminoadipic 1 0 - 6 umol/dL    Alanine 29 22 - 62 umol/dL    Anserine 0 umol/dL    Arginine 5 2 - 18 umol/dL    Asparagine 4 1 - 5 umol/dL    Aspartic Acid 0 0 - 4 umol/dL    B-Alanine Plasma 0 umol/dL    B-Aminoisobutyric 0 umol/dL    Carnosine 0 umol/dL    Citrulline 4.3 1.3 - 6.0 umol/dL    Cystathionine 0 umol/dL    Cystine 10 3 - 15 umol/dL    Glutamic Acid 6 0 - 16 umol/dL    Glutamine 67 41 - 86 umol/dL    Glycine 18 13 - 50 umol/dL    Histidine 7 3 - 15 umol/dL    1-Methylhistidine 3 (H) 0 - 2 umol/dL    3-Methylhistidine 0 0 - 1 umol/dL    Homocysteine umol/dL 0 umol/dL    Hydroxylysine 0 umol/dL    Hydroxyproline 2 0 - 3 umol/dL    Isoleucine 6 4 - 11 umol/dL    Leucine 12 8 - 21 umol/dL    Lysine 16 6 - 26 umol/dL    Methionine 2 1 - 6 umol/dL    Ornithine 8 2 - 16 umol/dL    Phenylalanine umol/dL 4.3 3.0 - 10.0 umol/dL    Proline 17 0 - 48 umol/dL    Sarcosine Plasma 0 umol/dL    Serine 8 4 - 18 umol/dL    Taurine 4 (L) 7 - 32 umol/dL    Threonine 11 5 - 25 umol/dL    Tyrosine umol/dL 5.7 4.0 - 13.0 umol/dL    Valine 23 8 - 46 umol/dL    Amino Acid Plasma Interpretation       Interpretation is not indicated for this specimen.     Prealbumin     Status: Normal   Result Value Ref Range    Prealbumin 20 15 - 45 mg/dL   Carnitine free and total     Status: None   Result Value Ref Range    Carnitine Free 25 25 - 60 umol/L    Carnitine Total 39 34 - 86 umol/L    Carnitine Esterified 14 5 - 29 umol/L    Carnitine Esterified/Free Ratio 0.6 0.1 - 1.0   Comprehensive metabolic panel     Status: Normal   Result Value Ref Range    Sodium 142 133 - 144 mmol/L    Potassium 3.7 3.4 - 5.3 mmol/L    Chloride 108 94 - 109 mmol/L    Carbon Dioxide (CO2) 26 20 - 32  mmol/L    Anion Gap 8 3 - 14 mmol/L    Urea Nitrogen 13 7 - 30 mg/dL    Creatinine 0.73 0.52 - 1.04 mg/dL    Calcium 9.2 8.5 - 10.1 mg/dL    Glucose 96 70 - 99 mg/dL    Alkaline Phosphatase 64 40 - 150 U/L    AST 14 0 - 45 U/L    ALT 29 0 - 50 U/L    Protein Total 7.5 6.8 - 8.8 g/dL    Albumin 3.6 3.4 - 5.0 g/dL    Bilirubin Total 0.6 0.2 - 1.3 mg/dL    GFR Estimate >90 >60 mL/min/1.73m2   CBC with platelets and differential     Status: None   Result Value Ref Range    WBC Count 6.0 4.0 - 11.0 10e3/uL    RBC Count 4.77 3.80 - 5.20 10e6/uL    Hemoglobin 13.4 11.7 - 15.7 g/dL    Hematocrit 40.8 35.0 - 47.0 %    MCV 86 78 - 100 fL    MCH 28.1 26.5 - 33.0 pg    MCHC 32.8 31.5 - 36.5 g/dL    RDW 13.0 10.0 - 15.0 %    Platelet Count 212 150 - 450 10e3/uL    % Neutrophils 53 %    % Lymphocytes 40 %    % Monocytes 4 %    % Eosinophils 2 %    % Basophils 1 %    % Immature Granulocytes 0 %    NRBCs per 100 WBC 0 <1 /100    Absolute Neutrophils 3.2 1.6 - 8.3 10e3/uL    Absolute Lymphocytes 2.4 0.8 - 5.3 10e3/uL    Absolute Monocytes 0.2 0.0 - 1.3 10e3/uL    Absolute Eosinophils 0.1 0.0 - 0.7 10e3/uL    Absolute Basophils 0.0 0.0 - 0.2 10e3/uL    Absolute Immature Granulocytes 0.0 <=0.4 10e3/uL    Absolute NRBCs 0.0 10e3/uL   CBC with Platelets & Differential     Status: None    Narrative    The following orders were created for panel order CBC with Platelets & Differential.  Procedure                               Abnormality         Status                     ---------                               -----------         ------                     CBC with platelets and d...[[id number]]                      Final result                 Please view results for these tests on the individual orders.     CHUN RODRIGUEZ M.D., FAAP, [facility]  Professor  Division of Genetics and Metabolism  Department of Pediatrics  [facility]    Routed to patient in Comm Mgt  Also to Alex Lewis    45 minutes spent on the date of the  encounter doing chart review, history and exam, documentation and further activities per the note        Again, thank you for allowing me to participate in the care of your patient.      Sincerely,    Toya Clayton MD

## 2022-06-23 NOTE — NURSING NOTE
"Chief Complaint   Patient presents with     Follow Up     Vital signs:  Temp: 99  F (37.2  C) Temp src: Oral BP: 124/85 Pulse: 90     SpO2: 97 %     Height: 165.1 cm (5' 5\") Weight: 108.5 kg (239 lb 1.6 oz)  Estimated body mass index is 39.79 kg/m  as calculated from the following:    Height as of this encounter: 1.651 m (5' 5\").    Weight as of this encounter: 108.5 kg (239 lb 1.6 oz).          "

## 2022-06-26 LAB
ACYLCARNITINE SERPL-SCNC: 14 UMOL/L
CARN ESTERS/C0 SERPL-SRTO: 0.6 {RATIO}
CARNITINE FREE SERPL-SCNC: 25 UMOL/L
CARNITINE SERPL-SCNC: 39 UMOL/L

## 2022-06-27 LAB
(HCYS)2 SERPL-SCNC: 0 UMOL/DL
1ME-HIST SERPL-SCNC: 3 UMOL/DL (ref 0–2)
3ME-HISTIDINE SERPL-SCNC: 0 UMOL/DL (ref 0–1)
AAA SERPL-SCNC: 1 UMOL/DL (ref 0–6)
ALANINE SERPL-SCNC: 0 UMOL/DL
ALANINE SFR SERPL: 29 UMOL/DL (ref 22–62)
AMINO ACID PAT SERPL-IMP: ABNORMAL
ANSERINE SERPL-SCNC: 0 UMOL/DL
ARGININE SERPL-SCNC: 5 UMOL/DL (ref 2–18)
ASPARAGINE SERPL-SCNC: 4 UMOL/DL (ref 1–5)
ASPARTATE SERPL-SCNC: 0 UMOL/DL (ref 0–4)
B-AIB SERPL-SCNC: 0 UMOL/DL
CARNOSINE SERPL-SCNC: 0 UMOL/DL
CITRULLINE SERPL-SCNC: 4.3 UMOL/DL (ref 1.3–6)
CYSTATHIONIN SERPL-SCNC: 0 UMOL/DL
CYSTINE SERPL-SCNC: 10 UMOL/DL (ref 3–15)
GLUTAMATE SERPL-SCNC: 6 UMOL/DL (ref 0–16)
GLUTAMATE SERPL-SCNC: 67 UMOL/DL (ref 41–86)
GLYCINE SERPL-SCNC: 18 UMOL/DL (ref 13–50)
HISTIDINE SERPL-SCNC: 7 UMOL/DL (ref 3–15)
ISOLEUCINE SERPL-SCNC: 6 UMOL/DL (ref 4–11)
LEUCINE SERPL-SCNC: 12 UMOL/DL (ref 8–21)
LYSINE SERPL-SCNC: 16 UMOL/DL (ref 6–26)
METHIONINE SERPL-SCNC: 2 UMOL/DL (ref 1–6)
OH-LYSINE SERPL-SCNC: 0 UMOL/DL
OH-PROLINE SERPL-SCNC: 2 UMOL/DL (ref 0–3)
ORNITHINE SERPL-SCNC: 8 UMOL/DL (ref 2–16)
PHE SERPL-SCNC: 4.3 UMOL/DL (ref 3–10)
PROLINE SERPL-SCNC: 17 UMOL/DL (ref 0–48)
SARCOSINE SERPL-SCNC: 0 UMOL/DL
SERINE SERPL-SCNC: 8 UMOL/DL (ref 4–18)
TAURINE SERPL-SCNC: 4 UMOL/DL (ref 7–32)
THREONINE SERPL-SCNC: 11 UMOL/DL (ref 5–25)
TYROSINE SERPL-SCNC: 5.7 UMOL/DL (ref 4–13)
VALINE SERPL-SCNC: 23 UMOL/DL (ref 8–46)

## 2022-07-09 NOTE — PROGRESS NOTES
CLINICAL NUTRITION SERVICES - ASSESSMENT NOTE      REASON FOR ASSESSMENT  Shira William is a 41 year old female seen by the dietitian for consult regarding OTC deficiency.      ANTHROPOMETRICS  Height: 165 cm or 65 in  Weight: 108.5 kg or 239 lbs  BMI: 39.9  Ideal body weight: 55 kg  Adjusted body weight: 68 kg      Comments: weight increased/up about ~9 lbs since visit 2 years ago    NUTRITION HISTORY  Patient is on a protein-restricted diet of 40-45 g/day.      Food log provided this visit:      Day 1: 1065 kcals, 35 gm pro  Day 2: 1175 kcals, 43 gm pro  Day 3: 935 kcals, 23 gm pro  3-day average: 1060 kcals, 34 gm/day      -Patient states she has been trying to go on walks every day.  She is currently living alone, so has also been doing a lot of Healthy Choice microwave meals (typically ~15 gm pro/meal) and pre-made salads from the grocery store.  She has been trying to work on her snacking which she knows is an issue, as well as trying to eat more fruit.  -She has given up pop, and drinks sparkling water or unsweetened iced tea for majority    Protein goal of 40-45 gm/day; this is derived from using 0.8 g/kg of IBW (44 gm/day).     LABS  Labs reviewed;   Plasma amino acids  -Glut: 67, WNL  -ILE: 6, WNL  -RADHA: 12, WNL  -KIERRA: 23, WNL  Albumin: 3.6, WNL  Prealbumin: 20, WNL     MEDICATIONS  Medications reviewed;   -MVI daily  -calcium/Vit D (600/400) BID  -3.4 mL Ravicti TID  -1.8 gm citrulline four times daily      ASSESSED NUTRITION NEEDS:  Estimated Energy Needs: Miffline-St. Jeor (1980) x 1.0-1.2 = 18-22 kcal/kg  Estimated Protein Needs: RDA for age = 0.8 g/kg, UCD/age range 0.5-1 g/kg using IBW-ABW  Micronutrient Needs: 1200 mg calcium, 15 mcg Vitamin D, 18 mg iron      NUTRITION DIAGNOSIS:  Impaired nutrient utilization related to diagnosis of OTC deficiency as evidenced by risk of hyperammonemia with stress/illness, catabolism, or very high protein intake.      Obesity related to excessive calorie  "intake and lack of exercise as evidenced by BMI in obese range.      INTERVENTIONS  Nutrition Prescription  Meet 100% estimated kcal, protein, amino acid, vitamin/mineral needs through low protein diet    Nutrition Education:   Discussed and reviewed continuing on low protein diet.     Reviewed weight changes, intake, and most recent labs.  -No changes to protein goals.  Food log indicated below goal protein intake, but labs suggestive of protein adequacy.  -Encouraged patient to continue work towards weight loss/management goals by focusing on increasing her physical activity and making healthy diet choices and avoiding overeating.  We reviewed some healthy snack ideas and discussed limiting/portion control of simple carbohydrates or \"white\" foods bread, pasta, potatoes, sugar, etc).  Also encouraged her again to put a nutrition nidia on her phone and enter the foods she eat to begin to learn more of protein choices and lower caloric choices.  Reminded her that increased logging tends to lead to more successful health changes overall.  -Patient is also working on her mental health with recent loss of her dad and having her  away. Encouraged her to continue her therapy for this.       Goals  1. Gradual weight loss/lower BMI  -goal not met  2. Meet >85% estimated nutrition needs through low protein diet  -goal met  3. Ammonia, plasma amino acids, protein status labs WNL  -goal met    FOLLOW UP/MONITORING  Energy Intake  Macronutrient intake  Anthropometric measurements     Jenni Joyner, RAISA, LD    Time spent with patient: 15 minutes  "

## 2022-08-15 ENCOUNTER — TELEPHONE (OUTPATIENT)
Dept: ENDOCRINOLOGY | Facility: CLINIC | Age: 42
End: 2022-08-15

## 2022-08-15 NOTE — TELEPHONE ENCOUNTER
8/15/2022 @ 1pm-       Placed call to patient to relay switch from [facility] to  Specialty Pharmacy for Ravicti refills moving forward. Provided phone number ([phone]) and encouraged patient to call this number and ask for Therapy Management Team. Reviewed that Therapy Management Team will typically reach out ~ 7 days prior to refill being needed. Patient indicated that she is in need of refill. Let patient know writer will reach out to them to set up delivery with patient. Patient additionally relayed to writer that abdominal ultrasound has been scheduled for 7/23 around 10/10:30 in morning. No additional needs noted.    AVA Moralez, CNP  Pediatric Genetics/Metabolism  Urea Cycle Disorder Coordinator  [facility]  Direct phone: [phone]

## 2022-09-08 DIAGNOSIS — E72.4 OTC (ORNITHINE TRANSCARBAMYLASE DEFICIENCY) (H): ICD-10-CM

## 2022-11-16 ENCOUNTER — TELEPHONE (OUTPATIENT)
Dept: ENDOCRINOLOGY | Facility: CLINIC | Age: 42
End: 2022-11-16

## 2022-11-16 NOTE — LETTER
[facility]  Attn: Nutrition Assistants  Fax: [phone]    Re: Shira William  : 1980  MRN: [id number]    2022    To Whom It May Concern:     Please accept the following as documentation of the prescribed supplement, Citrulline, for my patient, Shira William's Ornithine Transcarbamylase (OTC) deficiency.     Medication Sig Dispense Refill     l-citrulline POWD Take 1.8 g by mouth 4 times daily 250 g 12     Please contact our Urea Cycle Disorder Coordinator, Sharyn Mattson, APRN, CNP at [phone] if you have questions/concerns regarding this prescription.     Sincerely,    CHUN RODRIGUEZ M.D., FAAP, FACMG  Professor   Division of Genetics and Metabolism  Department of Pediatrics  [facility]

## 2022-11-16 NOTE — TELEPHONE ENCOUNTER
11/16/2022-        Patient contacted writer to notify of need for annual supplement orders for [facility] to dispense L-Citrulline. Orders generated and faxed to [facility] (see letter's tab).     AVA Moralez, CNP  Pediatric Genetics/Metabolism  [facility]  Direct phone: [phone]

## 2022-12-28 ENCOUNTER — TELEPHONE (OUTPATIENT)
Dept: ENDOCRINOLOGY | Facility: CLINIC | Age: 42
End: 2022-12-28

## 2022-12-28 NOTE — TELEPHONE ENCOUNTER
12/28/2022 @ 3:50 pm-        [facility] annual PA initiated for patient. Verbally answered questions from representative. Indicated Buphenyl/Sodium phenylbutyrate was previously tried and failed medication. Representative indicated no further questions and representative noted to writer that prior authorization is approved. Case number for PA is [id number] and approved from 1/01/2023 - 1/01/2024.     AVA Moralez, CNP  Pediatric Genetics/Metabolism  [facility]  Direct phone: [phone]

## 2022-12-29 NOTE — TELEPHONE ENCOUNTER
Prior Authorization Approval    Authorization Effective Date: 1/1/2023  Authorization Expiration Date: 1/1/2024  Medication: Ravicti (1.1 g/mL soln)--PA Approved 1/01/2023-1/01/2024  Approved Dose/Quantity: ud  Reference #:     Insurance Company: [employer] - Phone [phone] Fax [phone]  Expected CoPay:       CoPay Card Available:      Foundation Assistance Needed:    Which Pharmacy is filling the prescription (Not needed for infusion/clinic administered):    Pharmacy Notified:    Patient Notified:

## 2023-01-25 DIAGNOSIS — E72.4 OTC (ORNITHINE TRANSCARBAMYLASE DEFICIENCY) (H): Primary | ICD-10-CM

## 2023-01-26 ENCOUNTER — LAB (OUTPATIENT)
Dept: LAB | Facility: CLINIC | Age: 43
End: 2023-01-26
Payer: MEDICARE

## 2023-01-26 ENCOUNTER — OFFICE VISIT (OUTPATIENT)
Dept: ENDOCRINOLOGY | Facility: CLINIC | Age: 43
End: 2023-01-26
Payer: MEDICARE

## 2023-01-26 VITALS
HEART RATE: 87 BPM | BODY MASS INDEX: 37.61 KG/M2 | DIASTOLIC BLOOD PRESSURE: 83 MMHG | WEIGHT: 226 LBS | SYSTOLIC BLOOD PRESSURE: 129 MMHG | OXYGEN SATURATION: 95 %

## 2023-01-26 DIAGNOSIS — E72.4 OTC (ORNITHINE TRANSCARBAMYLASE DEFICIENCY) (H): ICD-10-CM

## 2023-01-26 DIAGNOSIS — E72.4 OTC (ORNITHINE TRANSCARBAMYLASE DEFICIENCY) (H): Primary | ICD-10-CM

## 2023-01-26 PROBLEM — E66.01 MORBID OBESITY (H): Status: ACTIVE | Noted: 2023-01-26

## 2023-01-26 LAB
ALBUMIN SERPL BCG-MCNC: 4.3 G/DL (ref 3.5–5.2)
ALP SERPL-CCNC: 60 U/L (ref 35–104)
ALT SERPL W P-5'-P-CCNC: 21 U/L (ref 10–35)
ANION GAP SERPL CALCULATED.3IONS-SCNC: 9 MMOL/L (ref 7–15)
AST SERPL W P-5'-P-CCNC: 26 U/L (ref 10–35)
BASOPHILS # BLD AUTO: 0.1 10E3/UL (ref 0–0.2)
BASOPHILS NFR BLD AUTO: 1 %
BILIRUB SERPL-MCNC: 0.6 MG/DL
BUN SERPL-MCNC: 11.1 MG/DL (ref 6–20)
CALCIUM SERPL-MCNC: 9.4 MG/DL (ref 8.6–10)
CHLORIDE SERPL-SCNC: 105 MMOL/L (ref 98–107)
CREAT SERPL-MCNC: 0.69 MG/DL (ref 0.51–0.95)
DEPRECATED HCO3 PLAS-SCNC: 26 MMOL/L (ref 22–29)
EOSINOPHIL # BLD AUTO: 0.2 10E3/UL (ref 0–0.7)
EOSINOPHIL NFR BLD AUTO: 3 %
ERYTHROCYTE [DISTWIDTH] IN BLOOD BY AUTOMATED COUNT: 13.3 % (ref 10–15)
GFR SERPL CREATININE-BSD FRML MDRD: >90 ML/MIN/1.73M2
GLUCOSE SERPL-MCNC: 125 MG/DL (ref 70–99)
HCT VFR BLD AUTO: 43.9 % (ref 35–47)
HGB BLD-MCNC: 14.5 G/DL (ref 11.7–15.7)
IMM GRANULOCYTES # BLD: 0 10E3/UL
IMM GRANULOCYTES NFR BLD: 0 %
LYMPHOCYTES # BLD AUTO: 2.1 10E3/UL (ref 0.8–5.3)
LYMPHOCYTES NFR BLD AUTO: 36 %
MCH RBC QN AUTO: 28.5 PG (ref 26.5–33)
MCHC RBC AUTO-ENTMCNC: 33 G/DL (ref 31.5–36.5)
MCV RBC AUTO: 86 FL (ref 78–100)
MONOCYTES # BLD AUTO: 0.2 10E3/UL (ref 0–1.3)
MONOCYTES NFR BLD AUTO: 4 %
NEUTROPHILS # BLD AUTO: 3.3 10E3/UL (ref 1.6–8.3)
NEUTROPHILS NFR BLD AUTO: 56 %
NRBC # BLD AUTO: 0 10E3/UL
NRBC BLD AUTO-RTO: 0 /100
PLATELET # BLD AUTO: 198 10E3/UL (ref 150–450)
POTASSIUM SERPL-SCNC: 4.2 MMOL/L (ref 3.4–5.3)
PROT SERPL-MCNC: 7.4 G/DL (ref 6.4–8.3)
RBC # BLD AUTO: 5.09 10E6/UL (ref 3.8–5.2)
SODIUM SERPL-SCNC: 140 MMOL/L (ref 136–145)
WBC # BLD AUTO: 5.8 10E3/UL (ref 4–11)

## 2023-01-26 PROCEDURE — 99000 SPECIMEN HANDLING OFFICE-LAB: CPT | Performed by: PATHOLOGY

## 2023-01-26 PROCEDURE — 82139 AMINO ACIDS QUAN 6 OR MORE: CPT | Performed by: MEDICAL GENETICS

## 2023-01-26 PROCEDURE — 85025 COMPLETE CBC W/AUTO DIFF WBC: CPT | Performed by: PATHOLOGY

## 2023-01-26 PROCEDURE — 36415 COLL VENOUS BLD VENIPUNCTURE: CPT | Performed by: PATHOLOGY

## 2023-01-26 PROCEDURE — 99417 PROLNG OP E/M EACH 15 MIN: CPT | Performed by: MEDICAL GENETICS

## 2023-01-26 PROCEDURE — 80053 COMPREHEN METABOLIC PANEL: CPT | Performed by: PATHOLOGY

## 2023-01-26 PROCEDURE — 99215 OFFICE O/P EST HI 40 MIN: CPT | Performed by: MEDICAL GENETICS

## 2023-01-26 PROCEDURE — 84134 ASSAY OF PREALBUMIN: CPT | Performed by: MEDICAL GENETICS

## 2023-01-26 NOTE — NURSING NOTE
Chief Complaint   Patient presents with     Follow Up     Blood pressure 129/83, pulse 87, weight 102.5 kg (226 lb), last menstrual period 08/01/2013, SpO2 95 %.    Brionna Hurley

## 2023-01-26 NOTE — PATIENT INSTRUCTIONS
Adult Metabolism Clinic  [facility] [place]     If questions/concerns, feel free to reach our nurse coordinator at the below number or send a SLIDt message for any non-urgent questions/concerns.    If any immediate needs because of illness or symptoms, contact the Pediatric Genetic/Metabolic physician on-call via [phone].    Team contact numbers:   RN Care Coordinator: [phone]  Jenni Joyner RD, LD (dietitian): [phone] or [email or web address]  Genetic/Metabolic Physician On-call: [phone]     Scheduling numbers:  General scheduling (Adult Call Center): [phone]                Please consider signing up for [employer] for easy and confidential communication. Please go to [email or web address].

## 2023-01-26 NOTE — PROGRESS NOTES
Adult Metabolism Clinic Return Visit  Name:  Shira William  :   1980  MRN:   [id number]  Date of Visit: 2023  PCP: Alex Lewis    Managing Metabolic Center(s):  [facility] Adult [facility].    Chief Complaint:  Ms. Shira William is a 42 year old female whom I saw in follow up in Metabolism Clinic for OTC (ornithine transcarbamylase deficiency) (H).  Ms. William was accompanied to this visit by her  . She also saw our dietitian at this visit.     Assessment:     Shira is fortunately generally stable with regard to her OTC deficiency. She is at risk for development of type II diabetes; this would complicate her overall metabolic disease management so attention to intake continues to be important in facilitating her best health. Individuals with OTC deficiency can ultimately develop hepatic steatosis related to this condition so this needs to be monitored for Shira; she should have an hepatic ultrasound     Plan:    1. Testing ordered at this visit:   No orders of the defined types were placed in this encounter.  [ labs had been undertaken by future order prior to this visit, see below].    Results are as noted, below.   2. Medications: Continue current medications.  3. Metabolic dietician follow up with Jenni Joyner RD, LD at this visit to review special dietary concerns. Metabolic diet should be continued as prescribed.    4.   Continue to observe emergency precautions as previously discussed.  Our on-call metabolic service is available 24 hours/day by calling the page  ([phone]) and asking for the Genetics and Metabolism doctor on call.  5. Return to the Adult Metabolism Clinic in 6 months for follow-up.     ----------  History of Present Illness:  Visit Diagnosis:  OTC (ornithine transcarbamylase deficiency) (H)    Discussed at this visit:  Unfortunately Jenni had an accident where she fell on her steps recently. She had  pain and swelling and couldn't stand so an x-ray was done that showed a fracture. For fully parts of this intervention, she had particular challenges because she couldn't climb steps and had to crawl up them. At this point, she is not supposed to bear weight for four weeks. She continues to be followed closely by her orthopedic specialist.     Jenni has not had any recent episodes that suggested metabolic decompensation. She works on being attentive to her protein intake.      Interval History:  Shira was last seen in our clinic on   6/23/2022.  Since the last clinic visit Shira was not seen for any urgent clinic visits.  She was seen in the emergency department for diagnosis and treatment of her injury.  She currently knows how to contact the 24 hour metabolic specialist on call and has a written emergency letter for this condition.  No hospitalizations occurred.  She had no general anesthesia and no surgical procedures.      Other health services currently received are primary care, dietitian and orthopedics  Current research study participation: Longitudinal Study of Urea Cycle Disorders.             Personal History:  Family History Update: There was no new family history information elicited at this visit    Lives with , Eran. Eran is currently on probation. They enjoy their pets and she particularly feels that they help with her overall sense of well being.  Current insurance status state/federal program (Medicaid/Medicare).      I have reviewed Shira s past medical history, family history, social history, medications and allergies as documented in the patient's electronic medical record.  There were no additional findings except as noted.     Medications:  Current Outpatient Medications   Medication Sig Dispense Refill     atenolol (TENORMIN) 50 MG tablet Take 50 mg by mouth daily       calcium-vitamin D (CALCIUM 600 + D) 600-400 MG-UNIT per tablet Take 1 tablet by mouth 2 times daily (with  meals). 60 tablet 0     glycerol Phenylbutyrate (RAVICTI) 1.1 GM/ML solution Take 3.4 mLs (3.74 g) by mouth 3 times daily with food 306 mL 11     glycopyrrolate (ROBINUL) 0.4 MG/2ML injection Inject 2 mg into the vein 3 times daily       ibuprofen (ADVIL,MOTRIN) 400 MG tablet Take 400 mg by mouth every 6 hours as needed for moderate pain       l-citrulline POWD Take 1.8 g by mouth 4 times daily 250 g 12     levOCARNitine (CARNITOR) 1 GM/10ML solution Take 5 mLs (500 mg) by mouth 3 times daily 450 mL 11     Multiple Vitamins-Iron (MULTIVITAMIN  /IRON) TABS Take 1 tablet daily.       omeprazole (PRILOSEC) 20 MG capsule Take by mouth daily       ondansetron (ZOFRAN-ODT) 4 MG disintegrating tablet Take by mouth every 12 hours as needed for nausea       Sertraline HCl (ZOLOFT PO) Take 50 mg by mouth daily       Allergies:  Allergies   Allergen Reactions     Compazine [Prochlorperazine]      Erythromycin      Msg [Monosodium Glutamate]      Physical Examination:  Blood pressure 129/83, pulse 87, weight 102.5 kg (226 lb), last menstrual period 08/01/2013, SpO2 95 %.  Body surface area is 2.17 meters squared.  BMI: Body mass index is 37.61 kg/m .  General: This was a well-developed, well-nourished female who responded appropriately to all requests during the examination.    Head and Neck:  She had hair of normal texture and distribution and her head was proportionate in appearance.  Eyes:  The pupils were equal, round, and reacted to light.   The conjunctivae were clear.   Ears:  Her ears were normal in architecture and placement.   Nose: The nose was clear.    Mouth and Throat: her dentition was normal.  The throat was without erythema.    Respiratory: The chest was clear to auscultation and had a symmetric appearance.    CV:  On examination of the heart, the rhythm was regular and there was no murmur.   GI: The abdomen was soft and had normal bowel sounds.  There was no hepatosplenomegaly.    : I deferred a   examination.   Musculoskeletal: There was a full range of motion on the extremity exam, and normal muscular volume and bulk.   Neurologic: The neurologic exam was normal, with normal cranial nerves, normal deep tendon reflexes, normal sensation, and a normal gait. She had normal tone.   Integument: The skin was normal with no rashes or unusual pigmentation.    Results of laboratory studies collected at this visit and available when this note was completed:   Results for orders placed or performed in visit on 01/26/23   Amino acids plasma quantitative     Status: Abnormal   Result Value Ref Range    A-Aminoadipic 0 0 - 6 umol/dL    Alanine 33 22 - 62 umol/dL    Anserine 0 umol/dL    Arginine 5 2 - 18 umol/dL    Asparagine 5 1 - 5 umol/dL    Aspartic Acid 1 0 - 4 umol/dL    B-Alanine Plasma 0 umol/dL    B-Aminoisobutyric 0 umol/dL    Carnosine 0 umol/dL    Citrulline 1.0 (L) 1.3 - 6.0 umol/dL    Cystathionine 0 umol/dL    Cystine 8 3 - 15 umol/dL    Glutamic Acid 11 0 - 16 umol/dL    Glutamine 80 41 - 86 umol/dL    Glycine 23 13 - 50 umol/dL    Histidine 7 3 - 15 umol/dL    1-Methylhistidine 2 0 - 2 umol/dL    3-Methylhistidine <1 0 - 1 umol/dL    Homocysteine umol/dL 0 umol/dL    Hydroxylysine 0 umol/dL    Hydroxyproline 1 0 - 3 umol/dL    Isoleucine 7 4 - 11 umol/dL    Leucine 13 8 - 21 umol/dL    Lysine 15 6 - 26 umol/dL    Methionine 3 1 - 6 umol/dL    Ornithine 5 2 - 16 umol/dL    Phenylalanine umol/dL 5.1 3.0 - 10.0 umol/dL    Proline 18 0 - 48 umol/dL    Sarcosine Plasma 0 umol/dL    Serine 11 4 - 18 umol/dL    Taurine 4 (L) 7 - 32 umol/dL    Threonine 11 5 - 25 umol/dL    Tyrosine umol/dL 6.2 4.0 - 13.0 umol/dL    Valine 23 8 - 46 umol/dL    Amino Acid Plasma Interpretation INTERPRETATION IS NOT INDICATED FOR THIS SPECIMEN    Prealbumin     Status: Normal   Result Value Ref Range    Prealbumin 21 15 - 45 mg/dL   Carnitine free and total     Status: Abnormal   Result Value Ref Range    Carnitine Free 23 (L) 25  - 60 umol/L    Carnitine Total 34 34 - 86 umol/L    Carnitine Esterified 11 5 - 29 umol/L    Carnitine Esterified/Free Ratio 0.5 0.1 - 1.0   Comprehensive metabolic panel     Status: Abnormal   Result Value Ref Range    Sodium 140 136 - 145 mmol/L    Potassium 4.2 3.4 - 5.3 mmol/L    Chloride 105 98 - 107 mmol/L    Carbon Dioxide (CO2) 26 22 - 29 mmol/L    Anion Gap 9 7 - 15 mmol/L    Urea Nitrogen 11.1 6.0 - 20.0 mg/dL    Creatinine 0.69 0.51 - 0.95 mg/dL    Calcium 9.4 8.6 - 10.0 mg/dL    Glucose 125 (H) 70 - 99 mg/dL    Alkaline Phosphatase 60 35 - 104 U/L    AST 26 10 - 35 U/L    ALT 21 10 - 35 U/L    Protein Total 7.4 6.4 - 8.3 g/dL    Albumin 4.3 3.5 - 5.2 g/dL    Bilirubin Total 0.6 <=1.2 mg/dL    GFR Estimate >90 >60 mL/min/1.73m2   CBC with platelets and differential     Status: None   Result Value Ref Range    WBC Count 5.8 4.0 - 11.0 10e3/uL    RBC Count 5.09 3.80 - 5.20 10e6/uL    Hemoglobin 14.5 11.7 - 15.7 g/dL    Hematocrit 43.9 35.0 - 47.0 %    MCV 86 78 - 100 fL    MCH 28.5 26.5 - 33.0 pg    MCHC 33.0 31.5 - 36.5 g/dL    RDW 13.3 10.0 - 15.0 %    Platelet Count 198 150 - 450 10e3/uL    % Neutrophils 56 %    % Lymphocytes 36 %    % Monocytes 4 %    % Eosinophils 3 %    % Basophils 1 %    % Immature Granulocytes 0 %    NRBCs per 100 WBC 0 <1 /100    Absolute Neutrophils 3.3 1.6 - 8.3 10e3/uL    Absolute Lymphocytes 2.1 0.8 - 5.3 10e3/uL    Absolute Monocytes 0.2 0.0 - 1.3 10e3/uL    Absolute Eosinophils 0.2 0.0 - 0.7 10e3/uL    Absolute Basophils 0.1 0.0 - 0.2 10e3/uL    Absolute Immature Granulocytes 0.0 <=0.4 10e3/uL    Absolute NRBCs 0.0 10e3/uL   CBC with Platelets & Differential     Status: None    Narrative    The following orders were created for panel order CBC with Platelets & Differential.  Procedure                               Abnormality         Status                     ---------                               -----------         ------                     CBC with platelets and  d...[[id number]]                      Final result                 Please view results for these tests on the individual orders.     CHUN RODRIGUEZ M.D., FAAP, FAC  Professor  Division of Genetics and Metabolism  Department of Pediatrics  [facility]    Routed to patient in Comm Mgt  Also to Alex Lewis    60 minutes spent on the date of the encounter doing chart review, history and exam, documentation and further activities per the note

## 2023-01-26 NOTE — LETTER
Caller: Hanna Matos    Relationship: Self    Best call back number: [phone]    What is the best time to reach you: ANYTIME    Who are you requesting to speak with (clinical staff, provider,  specific staff member): NURSE     What was the call regarding: PATIENT IS CALLING STATING THAT THE MEDICATIONS THAT PCP GAVE HER YESTERDAY MADE HER SICK AND DIZZY SO SHE QUIT TAKEN THEM.  TODAY SHE STATES THAT SHE IS HAVING DIARRHEA AND MORE PAIN IN HER UPPER ABDOMIN AND BACK.  CAN NOT GET HER IN TO DO AN ULTRA SOUND UNTIL July 25TH.  PATIENT IS WANTING TO SEE WHAT ELSE CAN BE DONE, SHE IS IN A LOT OF PAIN.      Do you require a callback: YES            2023     RE: Shira William  [address]  [address]  [place] [address]     Dear Colleague,    Thank you for referring your patient, Shira William, to the [facility] METABOLIC DISORDERS [facility]INIC [place] at [facility]. Please see a copy of my visit note below.      Adult Metabolism Clinic Return Visit  Name:  Shira William  :   1980  MRN:   [id number]  Date of Visit: 2023  PCP: Alex Lewis    Managing Metabolic Center(s):  [facility] Adult Metabolism [facility].    Chief Complaint:  Ms. Shira William is a 42 year old female whom I saw in follow up in Metabolism Clinic for OTC (ornithine transcarbamylase deficiency) (H).  Ms. William was accompanied to this visit by her  . She also saw our dietitian at this visit.     Assessment:     Shira is fortunately generally stable with regard to her OTC deficiency. She is at risk for development of type II diabetes; this would complicate her overall metabolic disease management so attention to intake continues to be important in facilitating her best health. Individuals with OTC deficiency can ultimately develop hepatic steatosis related to this condition so this needs to be monitored for Shira; she should have an hepatic ultrasound     Plan:    1. Testing ordered at this visit:   No orders of the defined types were placed in this encounter.  [ labs had been undertaken by future order prior to this visit, see below].    Results are as noted, below.   2. Medications: Continue current medications.  3. Metabolic dietician follow up with Jenni Joyner RD, LD at this visit to review special dietary concerns. Metabolic diet should be continued as prescribed.    4.   Continue to observe emergency precautions as previously discussed.  Our on-call metabolic service is available 24 hours/day by calling the page  ([phone]) and asking  for the Genetics and Metabolism doctor on call.  5. Return to the Adult Metabolism Clinic in 6 months for follow-up.     ----------  History of Present Illness:  Visit Diagnosis:  OTC (ornithine transcarbamylase deficiency) (H)    Discussed at this visit:  Unfortunately Jenni had an accident where she fell on her steps recently. She had pain and swelling and couldn't stand so an x-ray was done that showed a fracture. For fully parts of this intervention, she had particular challenges because she couldn't climb steps and had to crawl up them. At this point, she is not supposed to bear weight for four weeks. She continues to be followed closely by her orthopedic specialist.     Jenni has not had any recent episodes that suggested metabolic decompensation. She works on being attentive to her protein intake.      Interval History:  Shira was last seen in our clinic on   6/23/2022.  Since the last clinic visit Shira was not seen for any urgent clinic visits.  She was seen in the emergency department for diagnosis and treatment of her injury.  She currently knows how to contact the 24 hour metabolic specialist on call and has a written emergency letter for this condition.  No hospitalizations occurred.  She had no general anesthesia and no surgical procedures.      Other health services currently received are primary care, dietitian and orthopedics  Current research study participation: Longitudinal Study of Urea Cycle Disorders.             Personal History:  Family History Update: There was no new family history information elicited at this visit    Lives with , Eran. Eran is currently on probation. They enjoy their pets and she particularly feels that they help with her overall sense of well being.  Current insurance status state/federal program (Medicaid/Medicare).      I have reviewed Shira s past medical history, family history, social history, medications and allergies as documented in the patient's  electronic medical record.  There were no additional findings except as noted.     Medications:  Current Outpatient Medications   Medication Sig Dispense Refill     atenolol (TENORMIN) 50 MG tablet Take 50 mg by mouth daily       calcium-vitamin D (CALCIUM 600 + D) 600-400 MG-UNIT per tablet Take 1 tablet by mouth 2 times daily (with meals). 60 tablet 0     glycerol Phenylbutyrate (RAVICTI) 1.1 GM/ML solution Take 3.4 mLs (3.74 g) by mouth 3 times daily with food 306 mL 11     glycopyrrolate (ROBINUL) 0.4 MG/2ML injection Inject 2 mg into the vein 3 times daily       ibuprofen (ADVIL,MOTRIN) 400 MG tablet Take 400 mg by mouth every 6 hours as needed for moderate pain       l-citrulline POWD Take 1.8 g by mouth 4 times daily 250 g 12     levOCARNitine (CARNITOR) 1 GM/10ML solution Take 5 mLs (500 mg) by mouth 3 times daily 450 mL 11     Multiple Vitamins-Iron (MULTIVITAMIN  /IRON) TABS Take 1 tablet daily.       omeprazole (PRILOSEC) 20 MG capsule Take by mouth daily       ondansetron (ZOFRAN-ODT) 4 MG disintegrating tablet Take by mouth every 12 hours as needed for nausea       Sertraline HCl (ZOLOFT PO) Take 50 mg by mouth daily       Allergies:  Allergies   Allergen Reactions     Compazine [Prochlorperazine]      Erythromycin      Msg [Monosodium Glutamate]      Physical Examination:  Blood pressure 129/83, pulse 87, weight 102.5 kg (226 lb), last menstrual period 08/01/2013, SpO2 95 %.  Body surface area is 2.17 meters squared.  BMI: Body mass index is 37.61 kg/m .  General: This was a well-developed, well-nourished female who responded appropriately to all requests during the examination.    Head and Neck:  She had hair of normal texture and distribution and her head was proportionate in appearance.  Eyes:  The pupils were equal, round, and reacted to light.   The conjunctivae were clear.   Ears:  Her ears were normal in architecture and placement.   Nose: The nose was clear.    Mouth and Throat: her dentition  was normal.  The throat was without erythema.    Respiratory: The chest was clear to auscultation and had a symmetric appearance.    CV:  On examination of the heart, the rhythm was regular and there was no murmur.   GI: The abdomen was soft and had normal bowel sounds.  There was no hepatosplenomegaly.    : I deferred a  examination.   Musculoskeletal: There was a full range of motion on the extremity exam, and normal muscular volume and bulk.   Neurologic: The neurologic exam was normal, with normal cranial nerves, normal deep tendon reflexes, normal sensation, and a normal gait. She had normal tone.   Integument: The skin was normal with no rashes or unusual pigmentation.    Results of laboratory studies collected at this visit and available when this note was completed:   Results for orders placed or performed in visit on 01/26/23   Amino acids plasma quantitative     Status: Abnormal   Result Value Ref Range    A-Aminoadipic 0 0 - 6 umol/dL    Alanine 33 22 - 62 umol/dL    Anserine 0 umol/dL    Arginine 5 2 - 18 umol/dL    Asparagine 5 1 - 5 umol/dL    Aspartic Acid 1 0 - 4 umol/dL    B-Alanine Plasma 0 umol/dL    B-Aminoisobutyric 0 umol/dL    Carnosine 0 umol/dL    Citrulline 1.0 (L) 1.3 - 6.0 umol/dL    Cystathionine 0 umol/dL    Cystine 8 3 - 15 umol/dL    Glutamic Acid 11 0 - 16 umol/dL    Glutamine 80 41 - 86 umol/dL    Glycine 23 13 - 50 umol/dL    Histidine 7 3 - 15 umol/dL    1-Methylhistidine 2 0 - 2 umol/dL    3-Methylhistidine <1 0 - 1 umol/dL    Homocysteine umol/dL 0 umol/dL    Hydroxylysine 0 umol/dL    Hydroxyproline 1 0 - 3 umol/dL    Isoleucine 7 4 - 11 umol/dL    Leucine 13 8 - 21 umol/dL    Lysine 15 6 - 26 umol/dL    Methionine 3 1 - 6 umol/dL    Ornithine 5 2 - 16 umol/dL    Phenylalanine umol/dL 5.1 3.0 - 10.0 umol/dL    Proline 18 0 - 48 umol/dL    Sarcosine Plasma 0 umol/dL    Serine 11 4 - 18 umol/dL    Taurine 4 (L) 7 - 32 umol/dL    Threonine 11 5 - 25 umol/dL    Tyrosine umol/dL  6.2 4.0 - 13.0 umol/dL    Valine 23 8 - 46 umol/dL    Amino Acid Plasma Interpretation INTERPRETATION IS NOT INDICATED FOR THIS SPECIMEN    Prealbumin     Status: Normal   Result Value Ref Range    Prealbumin 21 15 - 45 mg/dL   Carnitine free and total     Status: Abnormal   Result Value Ref Range    Carnitine Free 23 (L) 25 - 60 umol/L    Carnitine Total 34 34 - 86 umol/L    Carnitine Esterified 11 5 - 29 umol/L    Carnitine Esterified/Free Ratio 0.5 0.1 - 1.0   Comprehensive metabolic panel     Status: Abnormal   Result Value Ref Range    Sodium 140 136 - 145 mmol/L    Potassium 4.2 3.4 - 5.3 mmol/L    Chloride 105 98 - 107 mmol/L    Carbon Dioxide (CO2) 26 22 - 29 mmol/L    Anion Gap 9 7 - 15 mmol/L    Urea Nitrogen 11.1 6.0 - 20.0 mg/dL    Creatinine 0.69 0.51 - 0.95 mg/dL    Calcium 9.4 8.6 - 10.0 mg/dL    Glucose 125 (H) 70 - 99 mg/dL    Alkaline Phosphatase 60 35 - 104 U/L    AST 26 10 - 35 U/L    ALT 21 10 - 35 U/L    Protein Total 7.4 6.4 - 8.3 g/dL    Albumin 4.3 3.5 - 5.2 g/dL    Bilirubin Total 0.6 <=1.2 mg/dL    GFR Estimate >90 >60 mL/min/1.73m2   CBC with platelets and differential     Status: None   Result Value Ref Range    WBC Count 5.8 4.0 - 11.0 10e3/uL    RBC Count 5.09 3.80 - 5.20 10e6/uL    Hemoglobin 14.5 11.7 - 15.7 g/dL    Hematocrit 43.9 35.0 - 47.0 %    MCV 86 78 - 100 fL    MCH 28.5 26.5 - 33.0 pg    MCHC 33.0 31.5 - 36.5 g/dL    RDW 13.3 10.0 - 15.0 %    Platelet Count 198 150 - 450 10e3/uL    % Neutrophils 56 %    % Lymphocytes 36 %    % Monocytes 4 %    % Eosinophils 3 %    % Basophils 1 %    % Immature Granulocytes 0 %    NRBCs per 100 WBC 0 <1 /100    Absolute Neutrophils 3.3 1.6 - 8.3 10e3/uL    Absolute Lymphocytes 2.1 0.8 - 5.3 10e3/uL    Absolute Monocytes 0.2 0.0 - 1.3 10e3/uL    Absolute Eosinophils 0.2 0.0 - 0.7 10e3/uL    Absolute Basophils 0.1 0.0 - 0.2 10e3/uL    Absolute Immature Granulocytes 0.0 <=0.4 10e3/uL    Absolute NRBCs 0.0 10e3/uL   CBC with Platelets &  Differential     Status: None    Narrative    The following orders were created for panel order CBC with Platelets & Differential.  Procedure                               Abnormality         Status                     ---------                               -----------         ------                     CBC with platelets and d...[[id number]]                      Final result                 Please view results for these tests on the individual orders.     CHUN RODRIGUEZ M.D., FAAP, [facility]  Professor  Division of Genetics and Metabolism  Department of Pediatrics  [facility]    Routed to patient in Comm Mgt  Also to Alex Lewis    60 minutes spent on the date of the encounter doing chart review, history and exam, documentation and further activities per the note

## 2023-01-27 LAB — PREALB SERPL IA-MCNC: 21 MG/DL (ref 15–45)

## 2023-01-30 LAB
ACYLCARNITINE SERPL-SCNC: 11 UMOL/L
CARN ESTERS/C0 SERPL-SRTO: 0.5 {RATIO}
CARNITINE FREE SERPL-SCNC: 23 UMOL/L
CARNITINE SERPL-SCNC: 34 UMOL/L

## 2023-01-31 LAB
(HCYS)2 SERPL-SCNC: 0 UMOL/DL
1ME-HIST SERPL-SCNC: 2 UMOL/DL (ref 0–2)
3ME-HISTIDINE SERPL-SCNC: <1 UMOL/DL (ref 0–1)
AAA SERPL-SCNC: 0 UMOL/DL (ref 0–6)
ALANINE SERPL-SCNC: 0 UMOL/DL
ALANINE SFR SERPL: 33 UMOL/DL (ref 22–62)
AMINO ACID PAT SERPL-IMP: ABNORMAL
ANSERINE SERPL-SCNC: 0 UMOL/DL
ARGININE SERPL-SCNC: 5 UMOL/DL (ref 2–18)
ASPARAGINE SERPL-SCNC: 5 UMOL/DL (ref 1–5)
ASPARTATE SERPL-SCNC: 1 UMOL/DL (ref 0–4)
B-AIB SERPL-SCNC: 0 UMOL/DL
CARNOSINE SERPL-SCNC: 0 UMOL/DL
CITRULLINE SERPL-SCNC: 1 UMOL/DL (ref 1.3–6)
CYSTATHIONIN SERPL-SCNC: 0 UMOL/DL
CYSTINE SERPL-SCNC: 8 UMOL/DL (ref 3–15)
GLUTAMATE SERPL-SCNC: 11 UMOL/DL (ref 0–16)
GLUTAMATE SERPL-SCNC: 80 UMOL/DL (ref 41–86)
GLYCINE SERPL-SCNC: 23 UMOL/DL (ref 13–50)
HISTIDINE SERPL-SCNC: 7 UMOL/DL (ref 3–15)
ISOLEUCINE SERPL-SCNC: 7 UMOL/DL (ref 4–11)
LEUCINE SERPL-SCNC: 13 UMOL/DL (ref 8–21)
LYSINE SERPL-SCNC: 15 UMOL/DL (ref 6–26)
METHIONINE SERPL-SCNC: 3 UMOL/DL (ref 1–6)
OH-LYSINE SERPL-SCNC: 0 UMOL/DL
OH-PROLINE SERPL-SCNC: 1 UMOL/DL (ref 0–3)
ORNITHINE SERPL-SCNC: 5 UMOL/DL (ref 2–16)
PHE SERPL-SCNC: 5.1 UMOL/DL (ref 3–10)
PROLINE SERPL-SCNC: 18 UMOL/DL (ref 0–48)
SARCOSINE SERPL-SCNC: 0 UMOL/DL
SERINE SERPL-SCNC: 11 UMOL/DL (ref 4–18)
TAURINE SERPL-SCNC: 4 UMOL/DL (ref 7–32)
THREONINE SERPL-SCNC: 11 UMOL/DL (ref 5–25)
TYROSINE SERPL-SCNC: 6.2 UMOL/DL (ref 4–13)
VALINE SERPL-SCNC: 23 UMOL/DL (ref 8–46)

## 2023-09-15 DIAGNOSIS — E72.4 OTC (ORNITHINE TRANSCARBAMYLASE DEFICIENCY) (H): ICD-10-CM

## 2023-09-27 DIAGNOSIS — E72.4 OTC (ORNITHINE TRANSCARBAMYLASE DEFICIENCY) (H): Primary | ICD-10-CM

## 2023-09-28 ENCOUNTER — ALLIED HEALTH/NURSE VISIT (OUTPATIENT)
Dept: PEDIATRICS | Facility: CLINIC | Age: 43
End: 2023-09-28
Attending: DIETITIAN, REGISTERED
Payer: MEDICARE

## 2023-09-28 ENCOUNTER — LAB (OUTPATIENT)
Dept: LAB | Facility: CLINIC | Age: 43
End: 2023-09-28
Payer: MEDICARE

## 2023-09-28 ENCOUNTER — OFFICE VISIT (OUTPATIENT)
Dept: ENDOCRINOLOGY | Facility: CLINIC | Age: 43
End: 2023-09-28
Payer: MEDICARE

## 2023-09-28 VITALS
OXYGEN SATURATION: 95 % | WEIGHT: 215.8 LBS | SYSTOLIC BLOOD PRESSURE: 109 MMHG | HEART RATE: 94 BPM | BODY MASS INDEX: 35.91 KG/M2 | DIASTOLIC BLOOD PRESSURE: 77 MMHG

## 2023-09-28 DIAGNOSIS — E72.4 OTC (ORNITHINE TRANSCARBAMYLASE DEFICIENCY) (H): Primary | ICD-10-CM

## 2023-09-28 DIAGNOSIS — E72.4 OTC (ORNITHINE TRANSCARBAMYLASE DEFICIENCY) (H): ICD-10-CM

## 2023-09-28 LAB
ALBUMIN SERPL BCG-MCNC: 4.3 G/DL (ref 3.5–5.2)
ALP SERPL-CCNC: 59 U/L (ref 35–104)
ALT SERPL W P-5'-P-CCNC: 14 U/L (ref 0–50)
ANION GAP SERPL CALCULATED.3IONS-SCNC: 12 MMOL/L (ref 7–15)
AST SERPL W P-5'-P-CCNC: 13 U/L (ref 0–45)
BASOPHILS # BLD AUTO: 0 10E3/UL (ref 0–0.2)
BASOPHILS NFR BLD AUTO: 1 %
BILIRUB SERPL-MCNC: 0.4 MG/DL
BUN SERPL-MCNC: 11.4 MG/DL (ref 6–20)
CALCIUM SERPL-MCNC: 9.6 MG/DL (ref 8.6–10)
CHLORIDE SERPL-SCNC: 104 MMOL/L (ref 98–107)
CREAT SERPL-MCNC: 0.74 MG/DL (ref 0.51–0.95)
EGFRCR SERPLBLD CKD-EPI 2021: >90 ML/MIN/1.73M2
EOSINOPHIL # BLD AUTO: 0.1 10E3/UL (ref 0–0.7)
EOSINOPHIL NFR BLD AUTO: 2 %
ERYTHROCYTE [DISTWIDTH] IN BLOOD BY AUTOMATED COUNT: 12.9 % (ref 10–15)
GLUCOSE SERPL-MCNC: 100 MG/DL (ref 70–99)
HCO3 SERPL-SCNC: 23 MMOL/L (ref 22–29)
HCT VFR BLD AUTO: 43.9 % (ref 35–47)
HGB BLD-MCNC: 14.7 G/DL (ref 11.7–15.7)
IMM GRANULOCYTES # BLD: 0 10E3/UL
IMM GRANULOCYTES NFR BLD: 0 %
LYMPHOCYTES # BLD AUTO: 1.9 10E3/UL (ref 0.8–5.3)
LYMPHOCYTES NFR BLD AUTO: 37 %
MCH RBC QN AUTO: 28.5 PG (ref 26.5–33)
MCHC RBC AUTO-ENTMCNC: 33.5 G/DL (ref 31.5–36.5)
MCV RBC AUTO: 85 FL (ref 78–100)
MONOCYTES # BLD AUTO: 0.2 10E3/UL (ref 0–1.3)
MONOCYTES NFR BLD AUTO: 4 %
NEUTROPHILS # BLD AUTO: 2.9 10E3/UL (ref 1.6–8.3)
NEUTROPHILS NFR BLD AUTO: 56 %
NRBC # BLD AUTO: 0 10E3/UL
NRBC BLD AUTO-RTO: 0 /100
PLATELET # BLD AUTO: 221 10E3/UL (ref 150–450)
POTASSIUM SERPL-SCNC: 4.3 MMOL/L (ref 3.4–5.3)
PROT SERPL-MCNC: 7.4 G/DL (ref 6.4–8.3)
RBC # BLD AUTO: 5.15 10E6/UL (ref 3.8–5.2)
SODIUM SERPL-SCNC: 139 MMOL/L (ref 135–145)
TRANSFERRIN SERPL-MCNC: 247 MG/DL (ref 200–360)
VIT D+METAB SERPL-MCNC: 27 NG/ML (ref 20–50)
WBC # BLD AUTO: 5.1 10E3/UL (ref 4–11)

## 2023-09-28 PROCEDURE — 99000 SPECIMEN HANDLING OFFICE-LAB: CPT | Performed by: PATHOLOGY

## 2023-09-28 PROCEDURE — 82139 AMINO ACIDS QUAN 6 OR MORE: CPT | Performed by: MEDICAL GENETICS

## 2023-09-28 PROCEDURE — 84466 ASSAY OF TRANSFERRIN: CPT | Mod: GZ | Performed by: MEDICAL GENETICS

## 2023-09-28 PROCEDURE — 99215 OFFICE O/P EST HI 40 MIN: CPT | Performed by: MEDICAL GENETICS

## 2023-09-28 PROCEDURE — 82306 VITAMIN D 25 HYDROXY: CPT | Performed by: MEDICAL GENETICS

## 2023-09-28 PROCEDURE — 36415 COLL VENOUS BLD VENIPUNCTURE: CPT | Performed by: PATHOLOGY

## 2023-09-28 PROCEDURE — 84134 ASSAY OF PREALBUMIN: CPT | Performed by: MEDICAL GENETICS

## 2023-09-28 PROCEDURE — 85025 COMPLETE CBC W/AUTO DIFF WBC: CPT | Performed by: PATHOLOGY

## 2023-09-28 PROCEDURE — 80053 COMPREHEN METABOLIC PANEL: CPT | Performed by: PATHOLOGY

## 2023-09-28 ASSESSMENT — PAIN SCALES - GENERAL: PAINLEVEL: NO PAIN (0)

## 2023-09-28 NOTE — LETTER
2023       RE: Shira William  [address]  [address]  [place] [facility] [address]     Dear Colleague,    Thank you for referring your patient, Shira William, to the [facility]OL[facility]IN[facility] [place] at [facility]. Please see a copy of my visit note below.    Adult Metabolism Clinic Return Visit  Name:  Shira William  :   1980  MRN:   [id number]  Date of Visit: Sep 28, 2023  PCP: Alex Lewis    Managing Metabolic Center(s):  [facility].    Chief Complaint:  Ms. Shira William is a 42 year old female whom I saw in follow up in Metabolism Clinic for OTC (ornithine transcarbamylase deficiency) (H24).  Ms. William was accompanied to this visit by her    . She also saw our dietitian and nurse coordinator at this visit.     Assessment:    Shira has fortunately been quite stable with regard to her OTC deficiency with no recent episodes suggesting metabolic decompensation.  She has been working to have a more varied diet and has been able to lose weight with careful attention to her intake.     Plan:    1. Testing ordered at this visit:   No orders of the defined types were placed in this encounter.  .  (Lab testing was ordered to be drawn prior to the visit, results are as noted, below.)  Results are as noted, below. Additional recommendations based on these laboratory results: Protein intake looks adequate based on plasma protein profile and amino acids. Glutamine and alanine are in the normal range suggesting against chronic hyperammonemia.  Citrulline is supplemented adequately.  Plasma carnitine is also reflective of adequate intake.  2. Medications: Continue current medications.  3. Metabolic dietician follow up with Jenni Joyner RD, LD at this visit to review special dietary concerns. Metabolic diet should be continued as prescribed.    4.   Continue to  observe emergency precautions as previously discussed.  Our on-call metabolic service is available 24 hours/day by calling the page  ([phone]) and asking for the Genetics and Metabolism doctor on call.  5. Return to the Adult Metabolism Clinic in 6 months for follow-up.     ----------  History of Present Illness:  Visit Diagnosis:  OTC (ornithine transcarbamylase deficiency) (H24)    Discussed at this visit:  Shira indicated that she felt things were actually going rather well.  She feels healthy and has been able to move around somewhat better as her ankle has slowly healed.  They have been ordering some meals from [employer] and find that this improves their variety.  She has not had any episodes that suggest metabolic decompensation since last being seen.      Interval History:  Shira was last seen in our clinic on 1/26/2023.  Since the last clinic visit Shira was not seen for any urgent clinic visits.  She was not seen in the emergency department.  She currently knows how to contact the 24 hour metabolic specialist on call and has a written emergency letter for this condition.  No hospitalizations occurred.  She had no general anesthesia and no surgical procedures.      Other health services currently received are primary care  Current research study participation:  Longitudinal Study of Urea Cycle Disorders  .           Personal History:  Family History Update:   There was no new family history information elicited at this visit.    Lives with her , Eran.  Eran is trying to look for work but is spending a fair amount of time taking family members to appointments.  They are enjoying their kitties.  Current insurance status state/federal program (Medicaid/Medicare).      I have reviewed Shira s past medical history, family history, social history, medications and allergies as documented in the patient's electronic medical record.  There were no additional findings except as noted.      Review of Systems:   Constitional: Has accomplished some weight loss  Eyes: negative - normal vision  Ears/Nose/Throat: negative - normal hearing  Respiratory: negative  Cardiovascular: negative  Gastrointestinal: negative  Genitourinary: negative  Hematologic/Lymphatic: negative  Allergy/Immunologic: negative - no drug allergies  Musculoskeletal: Still receiving physical therapy for her ankle injury.  Ultrasound stimulation has helped some.  She has been working to increase her mobility.  Endocrine: negative  Integument: negative  Neurologic: Some issues with headaches.  Massage of the neck helps some with this  Psychiatric: negative    Medications:  Current Outpatient Medications   Medication Sig Dispense Refill    atenolol (TENORMIN) 50 MG tablet Take 50 mg by mouth daily      calcium-vitamin D (CALCIUM 600 + D) 600-400 MG-UNIT per tablet Take 1 tablet by mouth 2 times daily (with meals). 60 tablet 0    glycerol Phenylbutyrate (RAVICTI) 1.1 GM/ML solution Take 3.4 mLs (3.74 g) by mouth 3 times daily with food 306 mL 11    glycopyrrolate (ROBINUL) 0.4 MG/2ML injection Inject 2 mg into the vein 3 times daily      ibuprofen (ADVIL,MOTRIN) 400 MG tablet Take 400 mg by mouth every 6 hours as needed for moderate pain      l-citrulline POWD Take 1.8 g by mouth 4 times daily 250 g 12    levOCARNitine (CARNITOR) 1 GM/10ML solution Take 5 mLs (500 mg) by mouth 3 times daily 450 mL 11    Multiple Vitamins-Iron (MULTIVITAMIN  /IRON) TABS Take 1 tablet daily.      omeprazole (PRILOSEC) 20 MG capsule Take by mouth daily      ondansetron (ZOFRAN-ODT) 4 MG disintegrating tablet Take by mouth every 12 hours as needed for nausea      Sertraline HCl (ZOLOFT PO) Take 50 mg by mouth daily          Allergies:  Allergies   Allergen Reactions    Compazine [Prochlorperazine]     Erythromycin     Msg [Monosodium Glutamate]         Physical Examination:  Last menstrual period 08/01/2013.  There is no height or weight on file to calculate  BSA.  BMI: There is no height or weight on file to calculate BMI.  General: This was a well-developed, well-nourished female who responded appropriately to all requests during the examination.    Head and Neck:  She had hair of normal texture and distribution and her head was proportionate in appearance.The neck was supple without lymphadenopathy.    Eyes:  The pupils were equal, round, and reacted to light.   The conjunctivae were clear.   Ears:  Her ears were normal in architecture and placement.   Nose: The nose was clear.    Mouth and Throat: her dentition was normal.  The throat was without erythema.    Respiratory: The chest was clear to auscultation and had a symmetric appearance.    CV:  On examination of the heart, the rhythm was regular and there was no murmur.  The peripheral pulses were normal.    GI: The abdomen was soft and had normal bowel sounds.  There was no hepatosplenomegaly.    : I deferred a  examination.   Musculoskeletal: There was a full range of motion on the extremity exam, and normal muscular volume and bulk.   Neurologic: The neurologic exam was normal, with normal cranial nerves, normal deep tendon reflexes, normal sensation, and a normal gait. She had normal tone.   Integument: The skin was normal with no rashes or unusual pigmentation.    Results of laboratory studies collected at this visit and available when this note was completed:   Results for orders placed or performed in visit on 09/28/23   Amino acids plasma quantitative     Status: Abnormal   Result Value Ref Range    A-Aminoadipic 1 0 - 6 umol/dL    Alanine 38 22 - 62 umol/dL    Anserine 0 umol/dL    Arginine 5 2 - 18 umol/dL    Asparagine 4 1 - 5 umol/dL    Aspartic Acid 1 0 - 4 umol/dL    B-Alanine Plasma 0 umol/dL    B-Aminoisobutyric 0 umol/dL    Carnosine 0 umol/dL    Citrulline 1.3 1.3 - 6.0 umol/dL    Cystathionine 0 umol/dL    Cystine 7 3 - 15 umol/dL    Glutamic Acid 13 0 - 16 umol/dL    Glutamine 66 41 - 86  umol/dL    Glycine 22 13 - 50 umol/dL    Histidine 7 3 - 15 umol/dL    1-Methylhistidine 3 (H) 0 - 2 umol/dL    3-Methylhistidine <1 0 - 1 umol/dL    Homocysteine umol/dL 0 umol/dL    Hydroxylysine 0 umol/dL    Hydroxyproline 1 0 - 3 umol/dL    Isoleucine 5 4 - 11 umol/dL    Leucine 8 8 - 21 umol/dL    Lysine 18 6 - 26 umol/dL    Methionine 2 1 - 6 umol/dL    Ornithine 5 2 - 16 umol/dL    Phenylalanine umol/dL 4.6 3.0 - 10.0 umol/dL    Proline 17 0 - 48 umol/dL    Sarcosine Plasma 0 umol/dL    Serine 8 4 - 18 umol/dL    Taurine 5 (L) 7 - 32 umol/dL    Threonine 11 5 - 25 umol/dL    Tyrosine umol/dL 6.3 4.0 - 13.0 umol/dL    Valine 17 8 - 46 umol/dL    Amino Acid Plasma Interpretation       INTERPRETATION IS NOT INDICATED FOR THIS SPECIMEN     Prealbumin     Status: Normal   Result Value Ref Range    Prealbumin 20 15 - 45 mg/dL   Transferrin     Status: Normal   Result Value Ref Range    Transferrin 247.0 200.0 - 360.0 mg/dL   Vitamin D Deficiency     Status: Normal   Result Value Ref Range    Vitamin D, Total (25-Hydroxy) 27 20 - 50 ng/mL    Narrative    Season, race, dietary intake, and treatment affect the concentration of 25-hydroxy-Vitamin D. Values may decrease during winter months and increase during summer months.    Vitamin D determination is routinely performed by an immunoassay specific for 25 hydroxyvitamin D3.  If an individual is on vitamin D2(ergocalciferol) supplementation, please specify 25 OH vitamin D2 and D3 level determination by LCMSMS test [id number].     Carnitine free and total     Status: None   Result Value Ref Range    Carnitine Free 31 25 - 60 umol/L    Carnitine Total 48 34 - 86 umol/L    Carnitine Esterified 17 5 - 29 umol/L    Carnitine Esterified/Free Ratio 0.5 0.1 - 1.0   Comprehensive metabolic panel     Status: Abnormal   Result Value Ref Range    Sodium 139 135 - 145 mmol/L    Potassium 4.3 3.4 - 5.3 mmol/L    Carbon Dioxide (CO2) 23 22 - 29 mmol/L    Anion Gap 12 7 - 15 mmol/L     Urea Nitrogen 11.4 6.0 - 20.0 mg/dL    Creatinine 0.74 0.51 - 0.95 mg/dL    GFR Estimate >90 >60 mL/min/1.73m2    Calcium 9.6 8.6 - 10.0 mg/dL    Chloride 104 98 - 107 mmol/L    Glucose 100 (H) 70 - 99 mg/dL    Alkaline Phosphatase 59 35 - 104 U/L    AST 13 0 - 45 U/L    ALT 14 0 - 50 U/L    Protein Total 7.4 6.4 - 8.3 g/dL    Albumin 4.3 3.5 - 5.2 g/dL    Bilirubin Total 0.4 <=1.2 mg/dL   CBC with platelets and differential     Status: None   Result Value Ref Range    WBC Count 5.1 4.0 - 11.0 10e3/uL    RBC Count 5.15 3.80 - 5.20 10e6/uL    Hemoglobin 14.7 11.7 - 15.7 g/dL    Hematocrit 43.9 35.0 - 47.0 %    MCV 85 78 - 100 fL    MCH 28.5 26.5 - 33.0 pg    MCHC 33.5 31.5 - 36.5 g/dL    RDW 12.9 10.0 - 15.0 %    Platelet Count 221 150 - 450 10e3/uL    % Neutrophils 56 %    % Lymphocytes 37 %    % Monocytes 4 %    % Eosinophils 2 %    % Basophils 1 %    % Immature Granulocytes 0 %    NRBCs per 100 WBC 0 <1 /100    Absolute Neutrophils 2.9 1.6 - 8.3 10e3/uL    Absolute Lymphocytes 1.9 0.8 - 5.3 10e3/uL    Absolute Monocytes 0.2 0.0 - 1.3 10e3/uL    Absolute Eosinophils 0.1 0.0 - 0.7 10e3/uL    Absolute Basophils 0.0 0.0 - 0.2 10e3/uL    Absolute Immature Granulocytes 0.0 <=0.4 10e3/uL    Absolute NRBCs 0.0 10e3/uL   CBC with Platelets & Differential     Status: None    Narrative    The following orders were created for panel order CBC with Platelets & Differential.  Procedure                               Abnormality         Status                     ---------                               -----------         ------                     CBC with platelets and d...[[id number]]                      Final result                 Please view results for these tests on the individual orders.       CHUN RODRIGUEZ M.D., FAAP, FACMG  Professor  Division of Genetics and Metabolism  Department of Pediatrics  [facility]    Routed to patient in Comm Mgt  Also to Alex Lewis    45 minutes spent on the date of the  encounter doing chart review, history and exam, documentation and further activities per the note

## 2023-09-28 NOTE — PATIENT INSTRUCTIONS
Adult Metabolism Clinic  [facility]     If questions/concerns, feel free to reach our nurse coordinator at the below number or send a [employer] message for any non-urgent questions/concerns.    If any immediate needs because of illness or symptoms, contact the Pediatric Genetic/Metabolic physician on-call via [phone].    Team contact numbers:   RN Care Coordinator: [phone]  Jenni Joyner RD, LD (dietitian): [phone] or [email or web address]  Genetic/Metabolic Physician On-call: [phone]     Scheduling numbers:  General scheduling (Adult Call Center): [phone]

## 2023-09-28 NOTE — PROGRESS NOTES
Adult Metabolism Clinic Return Visit  Name:  Shira William  :   1980  MRN:   [id number]  Date of Visit: Sep 28, 2023  PCP: Alex Lewis    Managing Metabolic Center(s):  [facility] Adult Metabolism Clinic.    Chief Complaint:  Ms. Shira William is a 42 year old female whom I saw in follow up in Metabolism Clinic for OTC (ornithine transcarbamylase deficiency) (H24).  Ms. William was accompanied to this visit by her    . She also saw our dietitian and nurse coordinator at this visit.     Assessment:    Shira has fortunately been quite stable with regard to her OTC deficiency with no recent episodes suggesting metabolic decompensation.  She has been working to have a more varied diet and has been able to lose weight with careful attention to her intake.     Plan:    1. Testing ordered at this visit:   No orders of the defined types were placed in this encounter.  .  (Lab testing was ordered to be drawn prior to the visit, results are as noted, below.)  Results are as noted, below. Additional recommendations based on these laboratory results: Protein intake looks adequate based on plasma protein profile and amino acids. Glutamine and alanine are in the normal range suggesting against chronic hyperammonemia.  Citrulline is supplemented adequately.  Plasma carnitine is also reflective of adequate intake.  2. Medications: Continue current medications.  3. Metabolic dietician follow up with Jenni Joyner RD, LD at this visit to review special dietary concerns. Metabolic diet should be continued as prescribed.    4.   Continue to observe emergency precautions as previously discussed.  Our on-call metabolic service is available 24 hours/day by calling the page  ([phone]) and asking for the Genetics and Metabolism doctor on call.  5. Return to the Adult Metabolism Clinic in 6 months for follow-up.     ----------  History of Present Illness:  Visit  Diagnosis:  OTC (ornithine transcarbamylase deficiency) (H24)    Discussed at this visit:  Shira indicated that she felt things were actually going rather well.  She feels healthy and has been able to move around somewhat better as her ankle has slowly healed.  They have been ordering some meals from [employer] and find that this improves their variety.  She has not had any episodes that suggest metabolic decompensation since last being seen.      Interval History:  Shira was last seen in our clinic on 1/26/2023.  Since the last clinic visit Shira was not seen for any urgent clinic visits.  She was not seen in the emergency department.  She currently knows how to contact the 24 hour metabolic specialist on call and has a written emergency letter for this condition.  No hospitalizations occurred.  She had no general anesthesia and no surgical procedures.      Other health services currently received are primary care  Current research study participation:  Longitudinal Study of Urea Cycle Disorders  .           Personal History:  Family History Update:   There was no new family history information elicited at this visit.    Lives with her , Eran.  Eran is trying to look for work but is spending a fair amount of time taking family members to appointments.  They are enjoying their kitties.  Current insurance status state/federal program (Medicaid/Medicare).      I have reviewed Shira s past medical history, family history, social history, medications and allergies as documented in the patient's electronic medical record.  There were no additional findings except as noted.     Review of Systems:   Constitional: Has accomplished some weight loss  Eyes: negative - normal vision  Ears/Nose/Throat: negative - normal hearing  Respiratory: negative  Cardiovascular: negative  Gastrointestinal: negative  Genitourinary: negative  Hematologic/Lymphatic: negative  Allergy/Immunologic: negative - no drug  allergies  Musculoskeletal: Still receiving physical therapy for her ankle injury.  Ultrasound stimulation has helped some.  She has been working to increase her mobility.  Endocrine: negative  Integument: negative  Neurologic: Some issues with headaches.  Massage of the neck helps some with this  Psychiatric: negative    Medications:  Current Outpatient Medications   Medication Sig Dispense Refill    atenolol (TENORMIN) 50 MG tablet Take 50 mg by mouth daily      calcium-vitamin D (CALCIUM 600 + D) 600-400 MG-UNIT per tablet Take 1 tablet by mouth 2 times daily (with meals). 60 tablet 0    glycerol Phenylbutyrate (RAVICTI) 1.1 GM/ML solution Take 3.4 mLs (3.74 g) by mouth 3 times daily with food 306 mL 11    glycopyrrolate (ROBINUL) 0.4 MG/2ML injection Inject 2 mg into the vein 3 times daily      ibuprofen (ADVIL,MOTRIN) 400 MG tablet Take 400 mg by mouth every 6 hours as needed for moderate pain      l-citrulline POWD Take 1.8 g by mouth 4 times daily 250 g 12    levOCARNitine (CARNITOR) 1 GM/10ML solution Take 5 mLs (500 mg) by mouth 3 times daily 450 mL 11    Multiple Vitamins-Iron (MULTIVITAMIN  /IRON) TABS Take 1 tablet daily.      omeprazole (PRILOSEC) 20 MG capsule Take by mouth daily      ondansetron (ZOFRAN-ODT) 4 MG disintegrating tablet Take by mouth every 12 hours as needed for nausea      Sertraline HCl (ZOLOFT PO) Take 50 mg by mouth daily          Allergies:  Allergies   Allergen Reactions    Compazine [Prochlorperazine]     Erythromycin     Msg [Monosodium Glutamate]         Physical Examination:  Last menstrual period 08/01/2013.  There is no height or weight on file to calculate BSA.  BMI: There is no height or weight on file to calculate BMI.  General: This was a well-developed, well-nourished female who responded appropriately to all requests during the examination.    Head and Neck:  She had hair of normal texture and distribution and her head was proportionate in appearance.The neck was  supple without lymphadenopathy.    Eyes:  The pupils were equal, round, and reacted to light.   The conjunctivae were clear.   Ears:  Her ears were normal in architecture and placement.   Nose: The nose was clear.    Mouth and Throat: her dentition was normal.  The throat was without erythema.    Respiratory: The chest was clear to auscultation and had a symmetric appearance.    CV:  On examination of the heart, the rhythm was regular and there was no murmur.  The peripheral pulses were normal.    GI: The abdomen was soft and had normal bowel sounds.  There was no hepatosplenomegaly.    : I deferred a  examination.   Musculoskeletal: There was a full range of motion on the extremity exam, and normal muscular volume and bulk.   Neurologic: The neurologic exam was normal, with normal cranial nerves, normal deep tendon reflexes, normal sensation, and a normal gait. She had normal tone.   Integument: The skin was normal with no rashes or unusual pigmentation.    Results of laboratory studies collected at this visit and available when this note was completed:   Results for orders placed or performed in visit on 09/28/23   Amino acids plasma quantitative     Status: Abnormal   Result Value Ref Range    A-Aminoadipic 1 0 - 6 umol/dL    Alanine 38 22 - 62 umol/dL    Anserine 0 umol/dL    Arginine 5 2 - 18 umol/dL    Asparagine 4 1 - 5 umol/dL    Aspartic Acid 1 0 - 4 umol/dL    B-Alanine Plasma 0 umol/dL    B-Aminoisobutyric 0 umol/dL    Carnosine 0 umol/dL    Citrulline 1.3 1.3 - 6.0 umol/dL    Cystathionine 0 umol/dL    Cystine 7 3 - 15 umol/dL    Glutamic Acid 13 0 - 16 umol/dL    Glutamine 66 41 - 86 umol/dL    Glycine 22 13 - 50 umol/dL    Histidine 7 3 - 15 umol/dL    1-Methylhistidine 3 (H) 0 - 2 umol/dL    3-Methylhistidine <1 0 - 1 umol/dL    Homocysteine umol/dL 0 umol/dL    Hydroxylysine 0 umol/dL    Hydroxyproline 1 0 - 3 umol/dL    Isoleucine 5 4 - 11 umol/dL    Leucine 8 8 - 21 umol/dL    Lysine 18 6 - 26  umol/dL    Methionine 2 1 - 6 umol/dL    Ornithine 5 2 - 16 umol/dL    Phenylalanine umol/dL 4.6 3.0 - 10.0 umol/dL    Proline 17 0 - 48 umol/dL    Sarcosine Plasma 0 umol/dL    Serine 8 4 - 18 umol/dL    Taurine 5 (L) 7 - 32 umol/dL    Threonine 11 5 - 25 umol/dL    Tyrosine umol/dL 6.3 4.0 - 13.0 umol/dL    Valine 17 8 - 46 umol/dL    Amino Acid Plasma Interpretation       INTERPRETATION IS NOT INDICATED FOR THIS SPECIMEN     Prealbumin     Status: Normal   Result Value Ref Range    Prealbumin 20 15 - 45 mg/dL   Transferrin     Status: Normal   Result Value Ref Range    Transferrin 247.0 200.0 - 360.0 mg/dL   Vitamin D Deficiency     Status: Normal   Result Value Ref Range    Vitamin D, Total (25-Hydroxy) 27 20 - 50 ng/mL    Narrative    Season, race, dietary intake, and treatment affect the concentration of 25-hydroxy-Vitamin D. Values may decrease during winter months and increase during summer months.    Vitamin D determination is routinely performed by an immunoassay specific for 25 hydroxyvitamin D3.  If an individual is on vitamin D2(ergocalciferol) supplementation, please specify 25 OH vitamin D2 and D3 level determination by LCMSMS test [id number].     Carnitine free and total     Status: None   Result Value Ref Range    Carnitine Free 31 25 - 60 umol/L    Carnitine Total 48 34 - 86 umol/L    Carnitine Esterified 17 5 - 29 umol/L    Carnitine Esterified/Free Ratio 0.5 0.1 - 1.0   Comprehensive metabolic panel     Status: Abnormal   Result Value Ref Range    Sodium 139 135 - 145 mmol/L    Potassium 4.3 3.4 - 5.3 mmol/L    Carbon Dioxide (CO2) 23 22 - 29 mmol/L    Anion Gap 12 7 - 15 mmol/L    Urea Nitrogen 11.4 6.0 - 20.0 mg/dL    Creatinine 0.74 0.51 - 0.95 mg/dL    GFR Estimate >90 >60 mL/min/1.73m2    Calcium 9.6 8.6 - 10.0 mg/dL    Chloride 104 98 - 107 mmol/L    Glucose 100 (H) 70 - 99 mg/dL    Alkaline Phosphatase 59 35 - 104 U/L    AST 13 0 - 45 U/L    ALT 14 0 - 50 U/L    Protein Total 7.4 6.4 - 8.3  g/dL    Albumin 4.3 3.5 - 5.2 g/dL    Bilirubin Total 0.4 <=1.2 mg/dL   CBC with platelets and differential     Status: None   Result Value Ref Range    WBC Count 5.1 4.0 - 11.0 10e3/uL    RBC Count 5.15 3.80 - 5.20 10e6/uL    Hemoglobin 14.7 11.7 - 15.7 g/dL    Hematocrit 43.9 35.0 - 47.0 %    MCV 85 78 - 100 fL    MCH 28.5 26.5 - 33.0 pg    MCHC 33.5 31.5 - 36.5 g/dL    RDW 12.9 10.0 - 15.0 %    Platelet Count 221 150 - 450 10e3/uL    % Neutrophils 56 %    % Lymphocytes 37 %    % Monocytes 4 %    % Eosinophils 2 %    % Basophils 1 %    % Immature Granulocytes 0 %    NRBCs per 100 WBC 0 <1 /100    Absolute Neutrophils 2.9 1.6 - 8.3 10e3/uL    Absolute Lymphocytes 1.9 0.8 - 5.3 10e3/uL    Absolute Monocytes 0.2 0.0 - 1.3 10e3/uL    Absolute Eosinophils 0.1 0.0 - 0.7 10e3/uL    Absolute Basophils 0.0 0.0 - 0.2 10e3/uL    Absolute Immature Granulocytes 0.0 <=0.4 10e3/uL    Absolute NRBCs 0.0 10e3/uL   CBC with Platelets & Differential     Status: None    Narrative    The following orders were created for panel order CBC with Platelets & Differential.  Procedure                               Abnormality         Status                     ---------                               -----------         ------                     CBC with platelets and d...[[id number]]                      Final result                 Please view results for these tests on the individual orders.       CHUN RODRIGUEZ M.D., MERNAP, FACMG  Professor  Division of Genetics and Metabolism  Department of Pediatrics  [facility]    Routed to patient in Comm Mgt  Also to Alex Lewis    45 minutes spent on the date of the encounter doing chart review, history and exam, documentation and further activities per the note

## 2023-09-29 LAB — PREALB SERPL IA-MCNC: 20 MG/DL (ref 15–45)

## 2023-10-01 LAB
ACYLCARNITINE SERPL-SCNC: 17 UMOL/L
CARN ESTERS/C0 SERPL-SRTO: 0.5 {RATIO}
CARNITINE FREE SERPL-SCNC: 31 UMOL/L
CARNITINE SERPL-SCNC: 48 UMOL/L

## 2023-10-03 LAB
(HCYS)2 SERPL-SCNC: 0 UMOL/DL
1ME-HIST SERPL-SCNC: 3 UMOL/DL (ref 0–2)
3ME-HISTIDINE SERPL-SCNC: <1 UMOL/DL (ref 0–1)
AAA SERPL-SCNC: 1 UMOL/DL (ref 0–6)
ALANINE SERPL-SCNC: 0 UMOL/DL
ALANINE SFR SERPL: 38 UMOL/DL (ref 22–62)
AMINO ACID PAT SERPL-IMP: ABNORMAL
ANSERINE SERPL-SCNC: 0 UMOL/DL
ARGININE SERPL-SCNC: 5 UMOL/DL (ref 2–18)
ASPARAGINE SERPL-SCNC: 4 UMOL/DL (ref 1–5)
ASPARTATE SERPL-SCNC: 1 UMOL/DL (ref 0–4)
B-AIB SERPL-SCNC: 0 UMOL/DL
CARNOSINE SERPL-SCNC: 0 UMOL/DL
CITRULLINE SERPL-SCNC: 1.3 UMOL/DL (ref 1.3–6)
CYSTATHIONIN SERPL-SCNC: 0 UMOL/DL
CYSTINE SERPL-SCNC: 7 UMOL/DL (ref 3–15)
GLUTAMATE SERPL-SCNC: 13 UMOL/DL (ref 0–16)
GLUTAMATE SERPL-SCNC: 66 UMOL/DL (ref 41–86)
GLYCINE SERPL-SCNC: 22 UMOL/DL (ref 13–50)
HISTIDINE SERPL-SCNC: 7 UMOL/DL (ref 3–15)
ISOLEUCINE SERPL-SCNC: 5 UMOL/DL (ref 4–11)
LEUCINE SERPL-SCNC: 8 UMOL/DL (ref 8–21)
LYSINE SERPL-SCNC: 18 UMOL/DL (ref 6–26)
METHIONINE SERPL-SCNC: 2 UMOL/DL (ref 1–6)
OH-LYSINE SERPL-SCNC: 0 UMOL/DL
OH-PROLINE SERPL-SCNC: 1 UMOL/DL (ref 0–3)
ORNITHINE SERPL-SCNC: 5 UMOL/DL (ref 2–16)
PHE SERPL-SCNC: 4.6 UMOL/DL (ref 3–10)
PROLINE SERPL-SCNC: 17 UMOL/DL (ref 0–48)
SARCOSINE SERPL-SCNC: 0 UMOL/DL
SERINE SERPL-SCNC: 8 UMOL/DL (ref 4–18)
TAURINE SERPL-SCNC: 5 UMOL/DL (ref 7–32)
THREONINE SERPL-SCNC: 11 UMOL/DL (ref 5–25)
TYROSINE SERPL-SCNC: 6.3 UMOL/DL (ref 4–13)
VALINE SERPL-SCNC: 17 UMOL/DL (ref 8–46)

## 2023-10-23 NOTE — PROGRESS NOTES
CLINICAL NUTRITION SERVICES - ASSESSMENT NOTE      REASON FOR ASSESSMENT  Shira William is a 42 year old female seen by the dietitian for consult regarding OTC deficiency.      ANTHROPOMETRICS  Height: 165 cm or 65 in  Weight: 97.9 kg or 215 lbs  BMI: 35.9  Ideal body weight: 55 kg  Adjusted body weight: 66 kg      Comments:   -loss of ~24 lbs since summer of 2022 (was 239 lbs).       NUTRITION HISTORY  Patient is on a protein-restricted diet of 40-45 g/day.    Patient states her and her  have been working at preparing healthier meals and trying to have less junk food in the house.  They have been doing [employer] meals and saving the recipes, feel this is very useful in learning how to prepare meals and include fruits and vegetables in their intake.  Patient continues to not drink pop or any sugar beverages.  She continues on carbonated water and other non-caloric drinks.    She is recovering from her ankle injury but this still makes movement difficult sometimes.    Protein goal of 40-45 gm/day; this is derived from using 0.8 g/kg of IBW (44 gm/day).     LABS  Labs reviewed; pending at time of visit     MEDICATIONS  Medications reviewed;   -MVI daily  -calcium/Vit D (600/400) BID  -3.4 mL Ravicti TID  -1.8 gm citrulline four times daily      ASSESSED NUTRITION NEEDS:  Estimated Energy Needs: Ramone-St. Ackerman (1980) x 1.0-1.2 = 18-22 kcal/kg  Estimated Protein Needs: RDA for age = 0.8 g/kg, UCD/age range 0.5-1 g/kg using IBW-ABW  Micronutrient Needs: 1200 mg calcium, 15 mcg Vitamin D, 18 mg iron      NUTRITION DIAGNOSIS:  Impaired nutrient utilization related to diagnosis of OTC deficiency as evidenced by risk of hyperammonemia with stress/illness, catabolism, or very high protein intake.      Obesity related to excessive calorie intake and lack of exercise as evidenced by BMI in obese range.      INTERVENTIONS  Nutrition Prescription  Meet 100% estimated kcal, protein, amino acid, vitamin/mineral  needs through low protein diet    Nutrition Education:   Discussed and reviewed continuing on low protein diet.     Reviewed weight changes, intake, and most recent labs.  -Encouraged patient and  on the progress they have made.  This amount of weight loss is very significant and patient should feel very proud of herself that she has been able to manage this.  Encouraged her to continue managing her diet this way and she will continue to make further progress.  -Together we discuss some other healthy recipe resources that contain nutrition facts per serving size and how she can use these to adjust the protein source to meet her goals.    Goals  1. Gradual weight loss/lower BMI  -goal met - loss of 24 lbs  2. Meet >85% estimated nutrition needs through low protein diet  -goal met  3. Ammonia, plasma amino acids, protein status labs WNL  -unable to assess, pending    FOLLOW UP/MONITORING  Energy Intake  Macronutrient intake  Anthropometric measurements     Jenni Joyner, RD, LD     Time spent with patient: 15 minutes

## 2023-12-05 ENCOUNTER — PHARMACY VISIT (OUTPATIENT)
Dept: ADMINISTRATIVE | Facility: CLINIC | Age: 43
End: 2023-12-05
Payer: MEDICARE

## 2023-12-05 NOTE — PROGRESS NOTES
Rare Disease Follow-up Assessment   Completed on 2023/12/05 19:14:02 [facility], by Fernanda Kirby        Activity Date 2023/12/05     Activity Medications    RAVICTI        Care Details    What are the patient's goals for this therapy?   ? 09/11/2023: pt did not give any goals      Did you identify any patient barriers to access and successful treatment?   ? No barriers to access identified      Please record side effects/medication concerns here:   ? None      In general, how would you rate your mental health, including your mood and your ability to think?   ? good      Please enter patient's PDC   ? 0.9      Based on the patient's report or refill record over the last 6-12 months, does the patient appear to be taking less than 80% of their medication?   ? No      Overall, how would you rate your general health, using a scale of 1 (poor) to 5 (excellent)?   ? Very Good          Summary Notes  I had the pleasure of speaking to pt via text for TM.   Ravicti: Side effects: none. Doses: Pt states things are going very well with remembering her doses.   RD: No health, allergy or medication changes. No questions or concerns.   Plan: Quarterly TM clinician follow up       Padmini KIRBY, PharmD, CSP  Therapy Management Pharmacist  [address] [address]   [email or web address]  [email or web address]   Specialty: [phone]  Mail Order: [phone]

## 2024-01-09 ENCOUNTER — TELEPHONE (OUTPATIENT)
Dept: CONSULT | Facility: CLINIC | Age: 44
End: 2024-01-09
Payer: MEDICARE

## 2024-01-09 NOTE — TELEPHONE ENCOUNTER
[facility] Prior Authorization Team   Phone: [phone]  Fax: [phone]    PA Needed    Medication: Ravicti - PA Needed  QTY/DS: 300ml / 29 days  NEW INS: No  Insurance Company:  [employer] D  Pharmacy Filling the Rx: [facility] - [place], [place] - [address]  PA :  24  Date of last fill: 23 - delivered 23

## 2024-01-09 NOTE — TELEPHONE ENCOUNTER
PA Initiation    Medication: RAVICTI 1.1 GM/ML PO LIQD  Insurance Company: [employer] - Phone [phone] Fax [phone]  Pharmacy Filling the Rx: [facility] - [place], [place] - [facility] [address]  Filling Pharmacy Phone: [phone]  Filling Pharmacy Fax: [phone]  Start Date: 1/9/2024  [id number]

## 2024-01-11 NOTE — TELEPHONE ENCOUNTER
Prior Authorization Approval    Medication: RAVICTI 1.1 GM/ML PO LIQD  Authorization Effective Date: 1/1/2024  Authorization Expiration Date: 1/8/2025  Approved Dose/Quantity: ud  Reference #: [id number]   Insurance Company: [employer] - Phone [phone] Fax [phone]  Expected CoPay: $    CoPay Card Available:      Financial Assistance Needed:    Which Pharmacy is filling the prescription: [facility] - [place] [address]  Pharmacy Notified:    Patient Notified:

## 2024-06-14 ENCOUNTER — TELEPHONE (OUTPATIENT)
Dept: ENDOCRINOLOGY | Facility: CLINIC | Age: 44
End: 2024-06-14
Payer: MEDICARE

## 2024-06-14 NOTE — TELEPHONE ENCOUNTER
Patient confirmed scheduled appointment:  Date: 6/27   Time: 2:45   Visit type: ump return   Provider: Forrest   Location: [facility] [place]   Testing/imaging: labs 12:30   Additional notes: 10/24 r/s     Amarjit Ramsey on 6/14/2024 at 1:58 PM

## 2024-06-24 DIAGNOSIS — E72.4 OTC (ORNITHINE TRANSCARBAMYLASE DEFICIENCY) (H): Primary | ICD-10-CM

## 2024-06-27 ENCOUNTER — OFFICE VISIT (OUTPATIENT)
Dept: PEDIATRICS | Facility: CLINIC | Age: 44
End: 2024-06-27
Attending: DIETITIAN, REGISTERED
Payer: MEDICARE

## 2024-06-27 ENCOUNTER — LAB (OUTPATIENT)
Dept: LAB | Facility: CLINIC | Age: 44
End: 2024-06-27
Payer: MEDICARE

## 2024-06-27 ENCOUNTER — OFFICE VISIT (OUTPATIENT)
Dept: ENDOCRINOLOGY | Facility: CLINIC | Age: 44
End: 2024-06-27
Payer: MEDICARE

## 2024-06-27 VITALS
BODY MASS INDEX: 35.49 KG/M2 | HEART RATE: 66 BPM | SYSTOLIC BLOOD PRESSURE: 116 MMHG | OXYGEN SATURATION: 98 % | HEIGHT: 65 IN | WEIGHT: 213 LBS | DIASTOLIC BLOOD PRESSURE: 77 MMHG

## 2024-06-27 DIAGNOSIS — E72.4 OTC (ORNITHINE TRANSCARBAMYLASE DEFICIENCY) (H): Primary | ICD-10-CM

## 2024-06-27 DIAGNOSIS — E72.4 OTC (ORNITHINE TRANSCARBAMYLASE DEFICIENCY) (H): ICD-10-CM

## 2024-06-27 LAB
ALBUMIN SERPL BCG-MCNC: 4.4 G/DL (ref 3.5–5.2)
ALP SERPL-CCNC: 79 U/L (ref 40–150)
ALT SERPL W P-5'-P-CCNC: 9 U/L (ref 0–50)
ANION GAP SERPL CALCULATED.3IONS-SCNC: 13 MMOL/L (ref 7–15)
AST SERPL W P-5'-P-CCNC: 17 U/L (ref 0–45)
BASOPHILS # BLD AUTO: 0.1 10E3/UL (ref 0–0.2)
BASOPHILS NFR BLD AUTO: 1 %
BILIRUB SERPL-MCNC: 0.4 MG/DL
BUN SERPL-MCNC: 8.4 MG/DL (ref 6–20)
CALCIUM SERPL-MCNC: 9.3 MG/DL (ref 8.6–10)
CHLORIDE SERPL-SCNC: 105 MMOL/L (ref 98–107)
CREAT SERPL-MCNC: 0.78 MG/DL (ref 0.51–0.95)
DEPRECATED HCO3 PLAS-SCNC: 22 MMOL/L (ref 22–29)
EGFRCR SERPLBLD CKD-EPI 2021: >90 ML/MIN/1.73M2
EOSINOPHIL # BLD AUTO: 0.1 10E3/UL (ref 0–0.7)
EOSINOPHIL NFR BLD AUTO: 2 %
ERYTHROCYTE [DISTWIDTH] IN BLOOD BY AUTOMATED COUNT: 12.9 % (ref 10–15)
GLUCOSE SERPL-MCNC: 81 MG/DL (ref 70–99)
HCT VFR BLD AUTO: 41.3 % (ref 35–47)
HGB BLD-MCNC: 13.4 G/DL (ref 11.7–15.7)
IMM GRANULOCYTES # BLD: 0 10E3/UL
IMM GRANULOCYTES NFR BLD: 0 %
LYMPHOCYTES # BLD AUTO: 1.8 10E3/UL (ref 0.8–5.3)
LYMPHOCYTES NFR BLD AUTO: 32 %
MCH RBC QN AUTO: 28.3 PG (ref 26.5–33)
MCHC RBC AUTO-ENTMCNC: 32.4 G/DL (ref 31.5–36.5)
MCV RBC AUTO: 87 FL (ref 78–100)
MONOCYTES # BLD AUTO: 0.2 10E3/UL (ref 0–1.3)
MONOCYTES NFR BLD AUTO: 4 %
NEUTROPHILS # BLD AUTO: 3.4 10E3/UL (ref 1.6–8.3)
NEUTROPHILS NFR BLD AUTO: 61 %
NRBC # BLD AUTO: 0 10E3/UL
NRBC BLD AUTO-RTO: 0 /100
PLATELET # BLD AUTO: 189 10E3/UL (ref 150–450)
POTASSIUM SERPL-SCNC: 3.9 MMOL/L (ref 3.4–5.3)
PREALB SERPL-MCNC: 22.6 MG/DL (ref 20–40)
PROT SERPL-MCNC: 7.6 G/DL (ref 6.4–8.3)
RBC # BLD AUTO: 4.74 10E6/UL (ref 3.8–5.2)
SODIUM SERPL-SCNC: 140 MMOL/L (ref 135–145)
WBC # BLD AUTO: 5.6 10E3/UL (ref 4–11)

## 2024-06-27 PROCEDURE — 99215 OFFICE O/P EST HI 40 MIN: CPT | Performed by: MEDICAL GENETICS

## 2024-06-27 PROCEDURE — 99000 SPECIMEN HANDLING OFFICE-LAB: CPT | Performed by: PATHOLOGY

## 2024-06-27 PROCEDURE — 36415 COLL VENOUS BLD VENIPUNCTURE: CPT | Performed by: PATHOLOGY

## 2024-06-27 PROCEDURE — 82139 AMINO ACIDS QUAN 6 OR MORE: CPT | Performed by: MEDICAL GENETICS

## 2024-06-27 PROCEDURE — G2211 COMPLEX E/M VISIT ADD ON: HCPCS | Performed by: MEDICAL GENETICS

## 2024-06-27 PROCEDURE — 80053 COMPREHEN METABOLIC PANEL: CPT | Performed by: PATHOLOGY

## 2024-06-27 PROCEDURE — 84134 ASSAY OF PREALBUMIN: CPT | Performed by: MEDICAL GENETICS

## 2024-06-27 PROCEDURE — 85025 COMPLETE CBC W/AUTO DIFF WBC: CPT | Performed by: PATHOLOGY

## 2024-06-27 ASSESSMENT — PAIN SCALES - GENERAL: PAINLEVEL: SEVERE PAIN (6)

## 2024-06-27 NOTE — LETTER
2024      RE: Shira William  [address] [address]     Dear Colleague,    Thank you for the opportunity to participate in the care of your patient, Shira William, at the [facility] at [facility]. Please see a copy of my visit note below.    ..CLINICAL NUTRITION SERVICES - PEDIATRIC ASSESSMENT NOTE    REASON FOR ASSESSMENT  Shira William is a 43 year old female seen by the dietitian in Adult metabolic clinic for OTC deficiency (urea cycle disorder). Patient is seen in combination with mom today.    RECOMMENDATIONS    Continue protein-restricted diet of 40-45 gm/day.    To schedule future appointment call [phone].        ANTHROPOMETRICS  Height: 165.1 cm or 65 in   Weight: 96.6 or 213 lbs  BMI: 35.5  IBW: 55 kg  ABW: 83 kg    Comments:  Weight:   Weight has remained stable over past 6 months: 215 lbs in : 239 lbs    NUTRITION HISTORY  Shira is on protein-restricted diet of 40-45 gm/day.    Typical oral intakes:  Food lo kcals and 35 gm pro  975 kcals and 32 gm pro  1425 kcals and 46 gm pro  3 day average: 1120 kcals/day (12 kcal/kg), 38 gm pro (0.5 g/kg ABW, 0.7 g/kg IBW).    Special considerations:  Nutrition related medical updates:   Patient has been to ER twice since last visit, unrelated to UCD   Special diet:   Low protein for UCD  Vitamins/Supplements:   MVI daily  Calcium/Vit D (600/400) BID    Other:  Physical activity:   Patient tries to go on walks a few times a week  Social:   Patient with social stressors happening in her life.  She has moved back in with mom and is currently living there.    GI:  Patient noted to have IBS and frequently has attacks aggravated by certain foods.    NUTRITION RELATED PHYSICAL FINDINGS  None    NUTRITION RELATED LABS  Labs reviewed  -Prealbumin: 22.6, WNl  Plasma Amino Acids  -GLUT: 64, WNL  -RADHA: 8, WNL  -ILE: 4,  WNL  -KIERRA: 15, WNL    NUTRITION RELATED MEDICATIONS  Medications reviewed  -Ravicti TID  -Citrulline four times daily    ESTIMATED NUTRITION NEEDS:  Estimated Energy Needs: Ramone-St. Ackerman (1980) x 1.0-1.2 = 18-22 kcal/kg  Estimated Protein Needs: RDA for age = 0.8 g/kg, UCD/age range 0.5-1 g/kg using IBW-ABW  Micronutrient Needs: 1200 mg calcium, 15 mcg Vitamin D, 18 mg iron    NUTRITION DIAGNOSIS  Impaired nutrient utilization related to diagnosis of OTC deficiency as evidenced by risk of hyperammonemia with stress/illness, catabolism, or very high protein intake.     INTERVENTIONS  Nutrition Prescription  Shira to meet 100% estimated needs through protein-restricted diet..    Nutrition Education:   Provided education on ongoing plan of nutritional care.    Overall, given life stressors it is an accomplishment that weight has remained stable.  Encouraged patient to make healthy food choices, choose daily physical activity for mental health and overall well-being while managing these stressors.  Mom assists in meal/food prep which is helpful for her  No changes to goals were recommended at this visit.    Implementation:  Implementation: Collaboration with other providers - seen with Adult Metabolic provider/MD.    Goals  BMI: decrease BMI or at minimum maintain/maintenance   Shira will follow a low protein diet  Protein-status labs WNL, plasma amino acids, glutamine/ammonia WNL    FOLLOW UP/MONITORING  Food and Beverage intake  Macronutrient intake  Anthropometric measurements    Spent 15 minutes in consult with Shira William and mother.    Jenni Joyner RD, LD      Please do not hesitate to contact me if you have any questions/concerns.     Sincerely,       Jenni Joyner RD

## 2024-06-27 NOTE — PATIENT INSTRUCTIONS
Adult Metabolism Clinic  [facility]     If questions/concerns, feel free to reach our nurse coordinator at the below number or send a [employer] message for any non-urgent questions/concerns.    If any immediate needs because of illness or symptoms, contact the Pediatric Genetic/Metabolic physician on-call via [phone].    Team contact numbers:   RN Care Coordinator: [phone]  Jenni Joyner RD, LD (dietitian): [phone] or [email or web address]  Genetic/Metabolic Physician On-call: [phone]     Scheduling numbers:  General scheduling (Adult Call Center): [phone]

## 2024-06-27 NOTE — LETTER
2024       RE: Shira William  [address] [address]     Dear Colleague,    Thank you for referring your patient, Shira William, to the [facility]ORD[facility] [place] at [facility]. Please see a copy of my visit note below.    Adult Metabolism Clinic Return Visit  Name:  Shira William  :   1980  MRN:   [id number]  Date of Visit: 2024  PCP: Alex Lewis    Managing Metabolic Center(s):  [facility] Adult [facility].    Chief Complaint:  Ms. Shira William is a 43 year old woman whom I saw in follow up in Metabolism Clinic for OTC (ornithine transcarbamylase deficiency) (H24).  Ms. William was accompanied to this visit by her  mother. She also saw our dietitian and nurse coordinator at this visit.     Assessment:    Shira has fortunately been generally stable with regard to her OTC deficiency with good dietary and medication compliance.  She is having challenges with her social/living situation on the short-term     Plan:    1. Testing ordered at this visit:   Testing was ordered prior to the visit; see below  Results are as noted, below. Additional recommendations based on these laboratory results: Plasma protein profile looked adequate.  Some amino acids were low suggesting protein restriction.  2. Medications: Continue current medications  3. Metabolic dietician follow up with Jenni Joyner RD, LD at this visit to review special dietary concerns. Metabolic diet should be continued as prescribed.   4.   Continue to observe emergency precautions as previously discussed.  Our on-call metabolic service is available 24 hours/day by calling the page  ([phone]) and asking for the Genetics and Metabolism doctor on call.  5. Return to the Adult Metabolism Clinic in 9 months for follow-up.     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  She has been having some issues with her irritable bowel syndrome and symptoms wax and wane.  She has had some weight loss she thought and is working on trying to be physically active on a regular basis, primarily by walking.      Interval History:  Shira was last seen in our clinic on 9/28/2023.  Since the last clinic visit Shira was not seen for any urgent clinic visits.  She was not seen in the emergency department.  She currently has a written emergency letter for this condition.  No hospitalizations occurred.  She had no general anesthesia and no surgical procedures.      Other health services currently received are primary care  Current research study participation:Longitudinal Study of Urea Cycle Disorders  .           Personal History:  Family History Update:   There was no new family history information elicited at this visit  Family History   Problem Relation Age of Onset    Genetic Disorder Mother         OTC deficiency    Genetic Disorder Brother         OTC deficiency   .    Lives with her mother currently as they are building was condemned because of problems in the building.  Eran is living with his father.  He has developed diabetes and has accumulated a number of other significant medical diagnoses that are primarily related to mental health.    Current insurance status state/federal program (Medicaid/Medicare).      I have reviewed Shira s past medical history, family history, social history,  medications and allergies as documented in the patient's electronic medical record.  There were no additional findings except as noted.     Review of Systems:   Constitional: Has accomplished some weight loss  Eyes: negative - normal vision  Ears/Nose/Throat: negative - normal hearing  Respiratory: negative  Cardiovascular: negative  Gastrointestinal: negative  Genitourinary: negative  Hematologic/Lymphatic: negative  Allergy/Immunologic: negative - no drug allergies  Musculoskeletal: Working on trying to walk on a regular basis  Endocrine: negative  Integument: negative  Neurologic: Some issues with headaches.    Psychiatric: negative    Medications:  Current Outpatient Medications   Medication Sig Dispense Refill    atenolol (TENORMIN) 50 MG tablet Take 50 mg by mouth daily      calcium-vitamin D (CALCIUM 600 + D) 600-400 MG-UNIT per tablet Take 1 tablet by mouth 2 times daily (with meals). 60 tablet 0    glycerol Phenylbutyrate (RAVICTI) 1.1 GM/ML solution Take 3.4 mLs (3.74 g) by mouth 3 times daily with food 306 mL 11    glycopyrrolate (ROBINUL) 0.4 MG/2ML injection Inject 2 mg into the vein 3 times daily      ibuprofen (ADVIL,MOTRIN) 400 MG tablet Take 400 mg by mouth every 6 hours as needed for moderate pain      l-citrulline POWD Take 1.8 g by mouth 4 times daily 250 g 12    Multiple Vitamins-Iron (MULTIVITAMIN  /IRON) TABS Take 1 tablet daily.      omeprazole (PRILOSEC) 20 MG capsule Take by mouth daily      ondansetron (ZOFRAN-ODT) 4 MG disintegrating tablet Take by mouth every 12 hours as needed for nausea      Sertraline HCl (ZOLOFT PO) Take 50 mg by mouth daily      levOCARNitine (CARNITOR) 1 GM/10ML solution Take 5 mLs (500 mg) by mouth 3 times daily (Patient not taking: Reported on 6/27/2024) 450 mL 11     Allergies:  Allergies   Allergen Reactions    Aspirin GI Disturbance and Unknown     Rxn unknown by patient    Compazine [Prochlorperazine]     Erythromycin     Msg [Monosodium Glutamate]   "    Physical Examination:  Blood pressure 116/77, pulse 66, height 1.651 m (5' 5\"), weight 96.6 kg (213 lb), last menstrual period 08/01/2013, SpO2 98%.  Body surface area is 2.1 meters squared.  BMI: Body mass index is 35.45 kg/m .  General: This was a well-developed, well-nourished female who responded appropriately to all requests during the examination.    Head and Neck:  She had hair of normal texture and distribution and her head was proportionate in appearance.The neck was supple without lymphadenopathy.    Eyes:  The pupils were equal, round, and reacted to light.   The conjunctivae were clear.   Ears:  Her ears were normal in architecture and placement.   Nose: The nose was clear.    Mouth and Throat: her dentition was normal.  The throat was without erythema.    Respiratory: The chest was clear to auscultation and had a symmetric appearance.    CV:  On examination of the heart, the rhythm was regular and there was no murmur.    GI: The abdomen was soft and had normal bowel sounds.  There was no hepatosplenomegaly.    : I deferred a  examination.   Musculoskeletal: There was a full range of motion on the extremity exam, and normal muscular volume and bulk.   Neurologic: The neurologic exam was normal, with normal cranial nerves, normal deep tendon reflexes, normal sensation, and a normal gait. She had normal tone.   Integument: The skin was normal with no rashes or unusual pigmentation.    Results of laboratory studies collected at this visit and available when this note was completed:   Results for orders placed or performed in visit on 06/27/24   Amino acids plasma quantitative     Status: Abnormal   Result Value Ref Range    A-Aminoadipic 0 0 - 6 umol/dL    Alanine 26 22 - 62 umol/dL    Anserine 0 umol/dL    Arginine 3 2 - 18 umol/dL    Asparagine 3 1 - 5 umol/dL    Aspartic Acid <1 0 - 4 umol/dL    B-Alanine Plasma 0 umol/dL    B-Aminoisobutyric 0 umol/dL    Carnosine 0 umol/dL    Citrulline 0.9 (L) " 1.3 - 6.0 umol/dL    Cystathionine 0 umol/dL    Cystine 10 3 - 15 umol/dL    Glutamic Acid 5 0 - 16 umol/dL    Glutamine 64 41 - 86 umol/dL    Glycine 17 13 - 50 umol/dL    Histidine 6 3 - 15 umol/dL    1-Methylhistidine 2 0 - 2 umol/dL    3-Methylhistidine <1 0 - 1 umol/dL    Homocysteine umol/dL 0 umol/dL    Hydroxylysine 0 umol/dL    Hydroxyproline 0 0 - 3 umol/dL    Isoleucine 4 4 - 11 umol/dL    Leucine 8 8 - 21 umol/dL    Lysine 11 6 - 26 umol/dL    Methionine 2 1 - 6 umol/dL    Ornithine 5 2 - 16 umol/dL    Phenylalanine umol/dL 4.1 3.0 - 10.0 umol/dL    Proline 11 0 - 48 umol/dL    Sarcosine Plasma 0 umol/dL    Serine 7 4 - 18 umol/dL    Taurine 5 (L) 7 - 32 umol/dL    Threonine 7 5 - 25 umol/dL    Tyrosine umol/dL 4.0 4.0 - 13.0 umol/dL    Valine 15 8 - 46 umol/dL    Amino Acid Plasma Interpretation       INTERPRETATION IS NOT INDICATED FOR THIS SPECIMEN     Prealbumin     Status: Normal   Result Value Ref Range    Prealbumin 22.6 20.0 - 40.0 mg/dL   Carnitine free and total     Status: None   Result Value Ref Range    Carnitine Free 25 25 - 60 umol/L    Carnitine Total 41 34 - 86 umol/L    Carnitine Esterified 16 5 - 29 umol/L    Carnitine Esterified/Free Ratio 0.6 0.1 - 1.0   Comprehensive metabolic panel     Status: Normal   Result Value Ref Range    Sodium 140 135 - 145 mmol/L    Potassium 3.9 3.4 - 5.3 mmol/L    Carbon Dioxide (CO2) 22 22 - 29 mmol/L    Anion Gap 13 7 - 15 mmol/L    Urea Nitrogen 8.4 6.0 - 20.0 mg/dL    Creatinine 0.78 0.51 - 0.95 mg/dL    GFR Estimate >90 >60 mL/min/1.73m2    Calcium 9.3 8.6 - 10.0 mg/dL    Chloride 105 98 - 107 mmol/L    Glucose 81 70 - 99 mg/dL    Alkaline Phosphatase 79 40 - 150 U/L    AST 17 0 - 45 U/L    ALT 9 0 - 50 U/L    Protein Total 7.6 6.4 - 8.3 g/dL    Albumin 4.4 3.5 - 5.2 g/dL    Bilirubin Total 0.4 <=1.2 mg/dL   CBC with platelets and differential     Status: None   Result Value Ref Range    WBC Count 5.6 4.0 - 11.0 10e3/uL    RBC Count 4.74 3.80 - 5.20  10e6/uL    Hemoglobin 13.4 11.7 - 15.7 g/dL    Hematocrit 41.3 35.0 - 47.0 %    MCV 87 78 - 100 fL    MCH 28.3 26.5 - 33.0 pg    MCHC 32.4 31.5 - 36.5 g/dL    RDW 12.9 10.0 - 15.0 %    Platelet Count 189 150 - 450 10e3/uL    % Neutrophils 61 %    % Lymphocytes 32 %    % Monocytes 4 %    % Eosinophils 2 %    % Basophils 1 %    % Immature Granulocytes 0 %    NRBCs per 100 WBC 0 <1 /100    Absolute Neutrophils 3.4 1.6 - 8.3 10e3/uL    Absolute Lymphocytes 1.8 0.8 - 5.3 10e3/uL    Absolute Monocytes 0.2 0.0 - 1.3 10e3/uL    Absolute Eosinophils 0.1 0.0 - 0.7 10e3/uL    Absolute Basophils 0.1 0.0 - 0.2 10e3/uL    Absolute Immature Granulocytes 0.0 <=0.4 10e3/uL    Absolute NRBCs 0.0 10e3/uL   CBC with Platelets & Differential     Status: None    Narrative    The following orders were created for panel order CBC with Platelets & Differential.  Procedure                               Abnormality         Status                     ---------                               -----------         ------                     CBC with platelets and d...[[id number]]                      Final result                 Please view results for these tests on the individual orders.     CHUN RODRIGUEZ M.D., MERNAP, [facility]  Professor  Division of Genetics and Metabolism  Department of Pediatrics  [facility]    Routed to patient in Comm Mgt  Also to Alex Lewis    40 minutes spent on the date of the encounter doing chart review, history and exam, documentation and further activities per the note. The longitudinal plan of care for the diagnosis(es)/condition(s) as documented were addressed during this visit. Due to the added complexity in care, I will continue to support Shira in the subsequent management and with ongoing continuity of care for her OTC deficiency.

## 2024-06-27 NOTE — PROGRESS NOTES
Adult Metabolism Clinic Return Visit  Name:  Shira William  :   1980  MRN:   [id number]  Date of Visit: 2024  PCP: Alex Lewis    Managing Metabolic Center(s):  [facility].    Chief Complaint:  Ms. Shira William is a 43 year old woman whom I saw in follow up in Metabolism Clinic for OTC (ornithine transcarbamylase deficiency) (H24).  Ms. William was accompanied to this visit by her  mother. She also saw our dietitian and nurse coordinator at this visit.     Assessment:    Shira has fortunately been generally stable with regard to her OTC deficiency with good dietary and medication compliance.  She is having challenges with her social/living situation on the short-term     Plan:    1. Testing ordered at this visit:   Testing was ordered prior to the visit; see below  Results are as noted, below. Additional recommendations based on these laboratory results: Plasma protein profile looked adequate.  Some amino acids were low suggesting protein restriction.  2. Medications: Continue current medications  3. Metabolic dietician follow up with Jenni Joyner RD, LD at this visit to review special dietary concerns. Metabolic diet should be continued as prescribed.   4.   Continue to observe emergency precautions as previously discussed.  Our on-call metabolic service is available 24 hours/day by calling the page  ([phone]) and asking for the Genetics and Metabolism doctor on call.  5. Return to the Adult Metabolism Clinic in 9 months for follow-up.     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  She has been having some issues with her irritable bowel syndrome and symptoms wax and wane.  She has had some weight loss she thought and is working on trying to be physically active on a regular basis, primarily by walking.      Interval History:  Shira was last seen in our clinic on 9/28/2023.  Since the last clinic visit Shira was not seen for any urgent clinic visits.  She was not seen in the emergency department.  She currently has a written emergency letter for this condition.  No hospitalizations occurred.  She had no general anesthesia and no surgical procedures.      Other health services currently received are primary care  Current research study participation:Longitudinal Study of Urea Cycle Disorders  .           Personal History:  Family History Update:   There was no new family history information elicited at this visit  Family History   Problem Relation Age of Onset    Genetic Disorder Mother         OTC deficiency    Genetic Disorder Brother         OTC deficiency   .    Lives with her mother currently as they are building was condemned because of problems in the building.  Eran is living with his father.  He has developed diabetes and has accumulated a number of other significant medical diagnoses that are primarily related to mental health.    Current insurance status state/federal program (Medicaid/Medicare).      I have reviewed Shira s past medical history, family history, social history, medications and allergies as documented in the patient's electronic medical record.  There were no additional findings except as noted.     Review of Systems:   Constitional: Has accomplished some weight loss  Eyes: negative - normal vision  Ears/Nose/Throat: negative - normal hearing  Respiratory: negative  Cardiovascular:  "negative  Gastrointestinal: negative  Genitourinary: negative  Hematologic/Lymphatic: negative  Allergy/Immunologic: negative - no drug allergies  Musculoskeletal: Working on trying to walk on a regular basis  Endocrine: negative  Integument: negative  Neurologic: Some issues with headaches.    Psychiatric: negative    Medications:  Current Outpatient Medications   Medication Sig Dispense Refill    atenolol (TENORMIN) 50 MG tablet Take 50 mg by mouth daily      calcium-vitamin D (CALCIUM 600 + D) 600-400 MG-UNIT per tablet Take 1 tablet by mouth 2 times daily (with meals). 60 tablet 0    glycerol Phenylbutyrate (RAVICTI) 1.1 GM/ML solution Take 3.4 mLs (3.74 g) by mouth 3 times daily with food 306 mL 11    glycopyrrolate (ROBINUL) 0.4 MG/2ML injection Inject 2 mg into the vein 3 times daily      ibuprofen (ADVIL,MOTRIN) 400 MG tablet Take 400 mg by mouth every 6 hours as needed for moderate pain      l-citrulline POWD Take 1.8 g by mouth 4 times daily 250 g 12    Multiple Vitamins-Iron (MULTIVITAMIN  /IRON) TABS Take 1 tablet daily.      omeprazole (PRILOSEC) 20 MG capsule Take by mouth daily      ondansetron (ZOFRAN-ODT) 4 MG disintegrating tablet Take by mouth every 12 hours as needed for nausea      Sertraline HCl (ZOLOFT PO) Take 50 mg by mouth daily      levOCARNitine (CARNITOR) 1 GM/10ML solution Take 5 mLs (500 mg) by mouth 3 times daily (Patient not taking: Reported on 6/27/2024) 450 mL 11     Allergies:  Allergies   Allergen Reactions    Aspirin GI Disturbance and Unknown     Rxn unknown by patient    Compazine [Prochlorperazine]     Erythromycin     Msg [Monosodium Glutamate]      Physical Examination:  Blood pressure 116/77, pulse 66, height 1.651 m (5' 5\"), weight 96.6 kg (213 lb), last menstrual period 08/01/2013, SpO2 98%.  Body surface area is 2.1 meters squared.  BMI: Body mass index is 35.45 kg/m .  General: This was a well-developed, well-nourished female who responded appropriately to all requests " during the examination.    Head and Neck:  She had hair of normal texture and distribution and her head was proportionate in appearance.The neck was supple without lymphadenopathy.    Eyes:  The pupils were equal, round, and reacted to light.   The conjunctivae were clear.   Ears:  Her ears were normal in architecture and placement.   Nose: The nose was clear.    Mouth and Throat: her dentition was normal.  The throat was without erythema.    Respiratory: The chest was clear to auscultation and had a symmetric appearance.    CV:  On examination of the heart, the rhythm was regular and there was no murmur.    GI: The abdomen was soft and had normal bowel sounds.  There was no hepatosplenomegaly.    : I deferred a  examination.   Musculoskeletal: There was a full range of motion on the extremity exam, and normal muscular volume and bulk.   Neurologic: The neurologic exam was normal, with normal cranial nerves, normal deep tendon reflexes, normal sensation, and a normal gait. She had normal tone.   Integument: The skin was normal with no rashes or unusual pigmentation.    Results of laboratory studies collected at this visit and available when this note was completed:   Results for orders placed or performed in visit on 06/27/24   Amino acids plasma quantitative     Status: Abnormal   Result Value Ref Range    A-Aminoadipic 0 0 - 6 umol/dL    Alanine 26 22 - 62 umol/dL    Anserine 0 umol/dL    Arginine 3 2 - 18 umol/dL    Asparagine 3 1 - 5 umol/dL    Aspartic Acid <1 0 - 4 umol/dL    B-Alanine Plasma 0 umol/dL    B-Aminoisobutyric 0 umol/dL    Carnosine 0 umol/dL    Citrulline 0.9 (L) 1.3 - 6.0 umol/dL    Cystathionine 0 umol/dL    Cystine 10 3 - 15 umol/dL    Glutamic Acid 5 0 - 16 umol/dL    Glutamine 64 41 - 86 umol/dL    Glycine 17 13 - 50 umol/dL    Histidine 6 3 - 15 umol/dL    1-Methylhistidine 2 0 - 2 umol/dL    3-Methylhistidine <1 0 - 1 umol/dL    Homocysteine umol/dL 0 umol/dL    Hydroxylysine 0 umol/dL     Hydroxyproline 0 0 - 3 umol/dL    Isoleucine 4 4 - 11 umol/dL    Leucine 8 8 - 21 umol/dL    Lysine 11 6 - 26 umol/dL    Methionine 2 1 - 6 umol/dL    Ornithine 5 2 - 16 umol/dL    Phenylalanine umol/dL 4.1 3.0 - 10.0 umol/dL    Proline 11 0 - 48 umol/dL    Sarcosine Plasma 0 umol/dL    Serine 7 4 - 18 umol/dL    Taurine 5 (L) 7 - 32 umol/dL    Threonine 7 5 - 25 umol/dL    Tyrosine umol/dL 4.0 4.0 - 13.0 umol/dL    Valine 15 8 - 46 umol/dL    Amino Acid Plasma Interpretation       INTERPRETATION IS NOT INDICATED FOR THIS SPECIMEN     Prealbumin     Status: Normal   Result Value Ref Range    Prealbumin 22.6 20.0 - 40.0 mg/dL   Carnitine free and total     Status: None   Result Value Ref Range    Carnitine Free 25 25 - 60 umol/L    Carnitine Total 41 34 - 86 umol/L    Carnitine Esterified 16 5 - 29 umol/L    Carnitine Esterified/Free Ratio 0.6 0.1 - 1.0   Comprehensive metabolic panel     Status: Normal   Result Value Ref Range    Sodium 140 135 - 145 mmol/L    Potassium 3.9 3.4 - 5.3 mmol/L    Carbon Dioxide (CO2) 22 22 - 29 mmol/L    Anion Gap 13 7 - 15 mmol/L    Urea Nitrogen 8.4 6.0 - 20.0 mg/dL    Creatinine 0.78 0.51 - 0.95 mg/dL    GFR Estimate >90 >60 mL/min/1.73m2    Calcium 9.3 8.6 - 10.0 mg/dL    Chloride 105 98 - 107 mmol/L    Glucose 81 70 - 99 mg/dL    Alkaline Phosphatase 79 40 - 150 U/L    AST 17 0 - 45 U/L    ALT 9 0 - 50 U/L    Protein Total 7.6 6.4 - 8.3 g/dL    Albumin 4.4 3.5 - 5.2 g/dL    Bilirubin Total 0.4 <=1.2 mg/dL   CBC with platelets and differential     Status: None   Result Value Ref Range    WBC Count 5.6 4.0 - 11.0 10e3/uL    RBC Count 4.74 3.80 - 5.20 10e6/uL    Hemoglobin 13.4 11.7 - 15.7 g/dL    Hematocrit 41.3 35.0 - 47.0 %    MCV 87 78 - 100 fL    MCH 28.3 26.5 - 33.0 pg    MCHC 32.4 31.5 - 36.5 g/dL    RDW 12.9 10.0 - 15.0 %    Platelet Count 189 150 - 450 10e3/uL    % Neutrophils 61 %    % Lymphocytes 32 %    % Monocytes 4 %    % Eosinophils 2 %    % Basophils 1 %    %  Immature Granulocytes 0 %    NRBCs per 100 WBC 0 <1 /100    Absolute Neutrophils 3.4 1.6 - 8.3 10e3/uL    Absolute Lymphocytes 1.8 0.8 - 5.3 10e3/uL    Absolute Monocytes 0.2 0.0 - 1.3 10e3/uL    Absolute Eosinophils 0.1 0.0 - 0.7 10e3/uL    Absolute Basophils 0.1 0.0 - 0.2 10e3/uL    Absolute Immature Granulocytes 0.0 <=0.4 10e3/uL    Absolute NRBCs 0.0 10e3/uL   CBC with Platelets & Differential     Status: None    Narrative    The following orders were created for panel order CBC with Platelets & Differential.  Procedure                               Abnormality         Status                     ---------                               -----------         ------                     CBC with platelets and d...[[id number]]                      Final result                 Please view results for these tests on the individual orders.     CHUN RODRIGUEZ M.D., FAAP, FAC  Professor  Division of Genetics and Metabolism  Department of Pediatrics  [facility]    Routed to patient in Comm Mgt  Also to Alex Lewis    40 minutes spent on the date of the encounter doing chart review, history and exam, documentation and further activities per the note. The longitudinal plan of care for the diagnosis(es)/condition(s) as documented were addressed during this visit. Due to the added complexity in care, I will continue to support Shira in the subsequent management and with ongoing continuity of care for her OTC deficiency.

## 2024-06-30 LAB
ACYLCARNITINE SERPL-SCNC: 16 UMOL/L
CARN ESTERS/C0 SERPL-SRTO: 0.6 {RATIO}
CARNITINE FREE SERPL-SCNC: 25 UMOL/L
CARNITINE SERPL-SCNC: 41 UMOL/L

## 2024-07-01 LAB
(HCYS)2 SERPL-SCNC: 0 UMOL/DL
1ME-HIST SERPL-SCNC: 2 UMOL/DL (ref 0–2)
3ME-HISTIDINE SERPL-SCNC: <1 UMOL/DL (ref 0–1)
AAA SERPL-SCNC: 0 UMOL/DL (ref 0–6)
ALANINE SERPL-SCNC: 0 UMOL/DL
ALANINE SFR SERPL: 26 UMOL/DL (ref 22–62)
AMINO ACID PAT SERPL-IMP: ABNORMAL
ANSERINE SERPL-SCNC: 0 UMOL/DL
ARGININE SERPL-SCNC: 3 UMOL/DL (ref 2–18)
ASPARAGINE SERPL-SCNC: 3 UMOL/DL (ref 1–5)
ASPARTATE SERPL-SCNC: <1 UMOL/DL (ref 0–4)
B-AIB SERPL-SCNC: 0 UMOL/DL
CARNOSINE SERPL-SCNC: 0 UMOL/DL
CITRULLINE SERPL-SCNC: 0.9 UMOL/DL (ref 1.3–6)
CYSTATHIONIN SERPL-SCNC: 0 UMOL/DL
CYSTINE SERPL-SCNC: 10 UMOL/DL (ref 3–15)
GLUTAMATE SERPL-SCNC: 5 UMOL/DL (ref 0–16)
GLUTAMATE SERPL-SCNC: 64 UMOL/DL (ref 41–86)
GLYCINE SERPL-SCNC: 17 UMOL/DL (ref 13–50)
HISTIDINE SERPL-SCNC: 6 UMOL/DL (ref 3–15)
ISOLEUCINE SERPL-SCNC: 4 UMOL/DL (ref 4–11)
LEUCINE SERPL-SCNC: 8 UMOL/DL (ref 8–21)
LYSINE SERPL-SCNC: 11 UMOL/DL (ref 6–26)
METHIONINE SERPL-SCNC: 2 UMOL/DL (ref 1–6)
OH-LYSINE SERPL-SCNC: 0 UMOL/DL
OH-PROLINE SERPL-SCNC: 0 UMOL/DL (ref 0–3)
ORNITHINE SERPL-SCNC: 5 UMOL/DL (ref 2–16)
PHE SERPL-SCNC: 4.1 UMOL/DL (ref 3–10)
PROLINE SERPL-SCNC: 11 UMOL/DL (ref 0–48)
SARCOSINE SERPL-SCNC: 0 UMOL/DL
SERINE SERPL-SCNC: 7 UMOL/DL (ref 4–18)
TAURINE SERPL-SCNC: 5 UMOL/DL (ref 7–32)
THREONINE SERPL-SCNC: 7 UMOL/DL (ref 5–25)
TYROSINE SERPL-SCNC: 4 UMOL/DL (ref 4–13)
VALINE SERPL-SCNC: 15 UMOL/DL (ref 8–46)

## 2024-07-18 NOTE — PROGRESS NOTES
..CLINICAL NUTRITION SERVICES - PEDIATRIC ASSESSMENT NOTE    REASON FOR ASSESSMENT  Shira William is a 43 year old female seen by the dietitian in Adult metabolic clinic for OTC deficiency (urea cycle disorder). Patient is seen in combination with mom today.    RECOMMENDATIONS    Continue protein-restricted diet of 40-45 gm/day.    To schedule future appointment call [phone].        ANTHROPOMETRICS  Height: 165.1 cm or 65 in   Weight: 96.6 or 213 lbs  BMI: 35.5  IBW: 55 kg  ABW: 83 kg    Comments:  Weight:   Weight has remained stable over past 6 months: 215 lbs in : 239 lbs    NUTRITION HISTORY  Shira is on protein-restricted diet of 40-45 gm/day.    Typical oral intakes:  Food lo kcals and 35 gm pro  975 kcals and 32 gm pro  1425 kcals and 46 gm pro  3 day average: 1120 kcals/day (12 kcal/kg), 38 gm pro (0.5 g/kg ABW, 0.7 g/kg IBW).    Special considerations:  Nutrition related medical updates:   Patient has been to ER twice since last visit, unrelated to UCD   Special diet:   Low protein for UCD  Vitamins/Supplements:   MVI daily  Calcium/Vit D (600/400) BID    Other:  Physical activity:   Patient tries to go on walks a few times a week  Social:   Patient with social stressors happening in her life.  She has moved back in with mom and is currently living there.    GI:  Patient noted to have IBS and frequently has attacks aggravated by certain foods.    NUTRITION RELATED PHYSICAL FINDINGS  None    NUTRITION RELATED LABS  Labs reviewed  -Prealbumin: 22.6, WNl  Plasma Amino Acids  -GLUT: 64, WNL  -RADHA: 8, WNL  -ILE: 4, WNL  -KIERRA: 15, WNL    NUTRITION RELATED MEDICATIONS  Medications reviewed  -Ravicti TID  -Citrulline four times daily    ESTIMATED NUTRITION NEEDS:  Estimated Energy Needs: [place]-St. Jeor () x 1.0-1.2 = 18-22 kcal/kg  Estimated Protein Needs: RDA for age = 0.8 g/kg, UCD/age range 0.5-1 g/kg using IBW-ABW  Micronutrient Needs: 1200 mg calcium, 15 mcg Vitamin  D, 18 mg iron    NUTRITION DIAGNOSIS  Impaired nutrient utilization related to diagnosis of OTC deficiency as evidenced by risk of hyperammonemia with stress/illness, catabolism, or very high protein intake.     INTERVENTIONS  Nutrition Prescription  Shira to meet 100% estimated needs through protein-restricted diet..    Nutrition Education:   Provided education on ongoing plan of nutritional care.    Overall, given life stressors it is an accomplishment that weight has remained stable.  Encouraged patient to make healthy food choices, choose daily physical activity for mental health and overall well-being while managing these stressors.  Mom assists in meal/food prep which is helpful for her  No changes to goals were recommended at this visit.    Implementation:  Implementation: Collaboration with other providers - seen with Adult Metabolic provider/MD.    Goals  BMI: decrease BMI or at minimum maintain/maintenance   Shira will follow a low protein diet  Protein-status labs WNL, plasma amino acids, glutamine/ammonia WNL    FOLLOW UP/MONITORING  Food and Beverage intake  Macronutrient intake  Anthropometric measurements    Spent 15 minutes in consult with Shira William and mother.    Jenni Joyner, RD, LD

## 2024-08-18 ENCOUNTER — HEALTH MAINTENANCE LETTER (OUTPATIENT)
Age: 44
End: 2024-08-18

## 2024-09-12 DIAGNOSIS — E72.4 OTC (ORNITHINE TRANSCARBAMYLASE DEFICIENCY) (H): ICD-10-CM

## 2024-10-09 ENCOUNTER — PHARMACY VISIT (OUTPATIENT)
Dept: ADMINISTRATIVE | Facility: CLINIC | Age: 44
End: 2024-10-09
Payer: MEDICARE

## 2024-10-09 NOTE — PROGRESS NOTES
"Rare Disease Follow-up Assessment    Spoke with Jenni for assessment. Current Regimen: Ravicti 3.4mL TID     Med/Allergy Changes: None reported     Tolerability: Denies SEs     Adherence: Denies missed doses. Goes on to explain her worries about if she's using an appropriate dosing interval for dosing three times daily. Been taking at breakfast (~10am), around 2-3pm, then at night with food. Let her know this is absolutely appropriate and she shouldn't worry about this schedule at all. Let her know we dose it three times daily to help keep steady levels in her system and is based on eating schedule as well. She also wanted to clarify some confusion about which phone number to call us back at, so we talked through best communication strategies both via two-way texting and calls.     Ravicti/OTC Deficiency: States that she's \"pretty stabilized\" on the Ravicti and that she has been keeping up with her appointments. No concerns at this time.     Follow-Up: Quarterly       Care Details    What are the patient's goals for this therapy?   ? [id number]: Maintain \"stabilized\" health status      Did you identify any patient barriers to access and successful treatment?   ? No barriers to access identified      Please record side effects/medication concerns here:   ? None      Please enter patient's PDC   ? 0.94      Based on the patient's report or refill record over the last 6-12 months, does the patient appear to be taking less than 80% of their medication?   ? No         Axia Gonsalez, PharmD  Therapy Management Pharmacist  [facility]  [facility]   "

## 2025-01-02 ENCOUNTER — TELEPHONE (OUTPATIENT)
Dept: CONSULT | Facility: CLINIC | Age: 45
End: 2025-01-02
Payer: MEDICARE

## 2025-01-02 NOTE — TELEPHONE ENCOUNTER
PA Initiation    Medication: RAVICTI 1.1 GM/ML PO LIQD  Insurance Company: [employer] - Phone [phone] Fax [phone]  Pharmacy Filling the Rx: [facility] - [place], [place] - [address]  Filling Pharmacy Phone:    Filling Pharmacy Fax:    Start Date: 1/2/2025     [id number]

## 2025-01-02 NOTE — TELEPHONE ENCOUNTER
Prior Authorization Approval    Medication: RAVICTI 1.1 GM/ML PO LIQD  Authorization Effective Date: 1/1/2025  Authorization Expiration Date: 1/2/2026  Approved Dose/Quantity: 306/30  Reference #: [id number]   Insurance Company: [employer] - Phone [phone] Fax [phone]  Expected CoPay: $ 0  CoPay Card Available:      Financial Assistance Needed: no  Which Pharmacy is filling the prescription: [facility] - [facility] [address]  Pharmacy Notified: renewal  Patient Notified: renewal

## 2025-04-17 ENCOUNTER — PHARMACY VISIT (OUTPATIENT)
Dept: ADMINISTRATIVE | Facility: CLINIC | Age: 45
End: 2025-04-17
Payer: MEDICARE

## 2025-04-17 NOTE — PROGRESS NOTES
"Rare Disease Follow-up Assessment  Spoke with Jenni for assessment. Current Regimen: Ravicti 3.4mL TID     Med/Allergy Changes: None reported     Tolerability: Denies SEs     Adherence: PDC = 0.98. Denies missed doses. Patient is dosing three times daily based on eating schedule and this is working well for her. Patient was having errors with 2-way texting but is ok with calling pharmacy back to set up deliveries.    Ravicti/OTC Deficiency: Patient states that she's \"pretty stabilized\" on the Ravicti and that she has been keeping up with her appointments. Next month follow up with Dr. Clayton on 4/24/25. No concerns at this time.     Follow-Up: Quarterly       Blanca Swan, PharmD  Therapy Management Pharmacist  [facility]  [facility]    "

## 2025-05-19 ENCOUNTER — DOCUMENTATION ONLY (OUTPATIENT)
Dept: CONSULT | Facility: CLINIC | Age: 45
End: 2025-05-19

## 2025-05-21 DIAGNOSIS — E72.4 OTC (ORNITHINE TRANSCARBAMYLASE DEFICIENCY): Primary | ICD-10-CM

## 2025-05-21 DIAGNOSIS — E55.9 VITAMIN D DEFICIENCY: ICD-10-CM

## 2025-05-22 ENCOUNTER — OFFICE VISIT (OUTPATIENT)
Dept: ENDOCRINOLOGY | Facility: CLINIC | Age: 45
End: 2025-05-22
Payer: MEDICARE

## 2025-05-22 ENCOUNTER — OFFICE VISIT (OUTPATIENT)
Dept: PEDIATRICS | Facility: CLINIC | Age: 45
End: 2025-05-22
Attending: DIETITIAN, REGISTERED
Payer: MEDICARE

## 2025-05-22 ENCOUNTER — LAB (OUTPATIENT)
Dept: LAB | Facility: CLINIC | Age: 45
End: 2025-05-22
Payer: MEDICARE

## 2025-05-22 VITALS
SYSTOLIC BLOOD PRESSURE: 129 MMHG | BODY MASS INDEX: 34.32 KG/M2 | HEART RATE: 78 BPM | WEIGHT: 206 LBS | DIASTOLIC BLOOD PRESSURE: 77 MMHG | OXYGEN SATURATION: 97 % | HEIGHT: 65 IN

## 2025-05-22 DIAGNOSIS — E72.4 OTC (ORNITHINE TRANSCARBAMYLASE DEFICIENCY): ICD-10-CM

## 2025-05-22 DIAGNOSIS — E71.40 CARNITINE DEFICIENCY: ICD-10-CM

## 2025-05-22 DIAGNOSIS — E55.9 VITAMIN D DEFICIENCY: ICD-10-CM

## 2025-05-22 DIAGNOSIS — E72.4 OTC (ORNITHINE TRANSCARBAMYLASE DEFICIENCY): Primary | ICD-10-CM

## 2025-05-22 LAB
ALBUMIN SERPL BCG-MCNC: 4.2 G/DL (ref 3.5–5.2)
ALP SERPL-CCNC: 74 U/L (ref 40–150)
ALT SERPL W P-5'-P-CCNC: 18 U/L (ref 0–50)
ANION GAP SERPL CALCULATED.3IONS-SCNC: 12 MMOL/L (ref 7–15)
AST SERPL W P-5'-P-CCNC: 15 U/L (ref 0–45)
BASOPHILS # BLD AUTO: 0.1 10E3/UL (ref 0–0.2)
BASOPHILS NFR BLD AUTO: 1 %
BILIRUB SERPL-MCNC: 0.5 MG/DL
BUN SERPL-MCNC: 7 MG/DL (ref 6–20)
CALCIUM SERPL-MCNC: 9.3 MG/DL (ref 8.8–10.4)
CHLORIDE SERPL-SCNC: 103 MMOL/L (ref 98–107)
CREAT SERPL-MCNC: 0.65 MG/DL (ref 0.51–0.95)
EGFRCR SERPLBLD CKD-EPI 2021: >90 ML/MIN/1.73M2
EOSINOPHIL # BLD AUTO: 0.1 10E3/UL (ref 0–0.7)
EOSINOPHIL NFR BLD AUTO: 2 %
ERYTHROCYTE [DISTWIDTH] IN BLOOD BY AUTOMATED COUNT: 12.4 % (ref 10–15)
GLUCOSE SERPL-MCNC: 109 MG/DL (ref 70–99)
HCO3 SERPL-SCNC: 24 MMOL/L (ref 22–29)
HCT VFR BLD AUTO: 42.8 % (ref 35–47)
HGB BLD-MCNC: 14.4 G/DL (ref 11.7–15.7)
IMM GRANULOCYTES # BLD: 0 10E3/UL
IMM GRANULOCYTES NFR BLD: 0 %
LYMPHOCYTES # BLD AUTO: 2 10E3/UL (ref 0.8–5.3)
LYMPHOCYTES NFR BLD AUTO: 31 %
MCH RBC QN AUTO: 28.3 PG (ref 26.5–33)
MCHC RBC AUTO-ENTMCNC: 33.6 G/DL (ref 31.5–36.5)
MCV RBC AUTO: 84 FL (ref 78–100)
MONOCYTES # BLD AUTO: 0.2 10E3/UL (ref 0–1.3)
MONOCYTES NFR BLD AUTO: 3 %
NEUTROPHILS # BLD AUTO: 4.2 10E3/UL (ref 1.6–8.3)
NEUTROPHILS NFR BLD AUTO: 64 %
NRBC # BLD AUTO: 0 10E3/UL
NRBC BLD AUTO-RTO: 0 /100
PLATELET # BLD AUTO: 212 10E3/UL (ref 150–450)
POTASSIUM SERPL-SCNC: 3.6 MMOL/L (ref 3.4–5.3)
PROT SERPL-MCNC: 7.3 G/DL (ref 6.4–8.3)
RBC # BLD AUTO: 5.09 10E6/UL (ref 3.8–5.2)
SODIUM SERPL-SCNC: 139 MMOL/L (ref 135–145)
WBC # BLD AUTO: 6.5 10E3/UL (ref 4–11)

## 2025-05-22 PROCEDURE — 82306 VITAMIN D 25 HYDROXY: CPT | Performed by: MEDICAL GENETICS

## 2025-05-22 PROCEDURE — 82139 AMINO ACIDS QUAN 6 OR MORE: CPT | Performed by: MEDICAL GENETICS

## 2025-05-22 PROCEDURE — 84134 ASSAY OF PREALBUMIN: CPT | Performed by: MEDICAL GENETICS

## 2025-05-22 ASSESSMENT — PAIN SCALES - GENERAL: PAINLEVEL_OUTOF10: NO PAIN (0)

## 2025-05-22 NOTE — PROGRESS NOTES
Adult Metabolism Clinic Return Visit  Name:  Shira William  :   1980  MRN:   [id number]  Date of Visit: 2025  PCP: Alex Lewis    Managing Metabolic Center(s):  [facility] Adult [facility].    Chief Complaint:  Ms. Shira William is a 43 year old woman whom I saw in follow up in Metabolism Clinic for OTC (ornithine transcarbamylase deficiency) (H24).  Ms. William was accompanied to this visit by her  mother. She also saw our dietitian and nurse coordinator at this visit. Seen by Zaheer Rooney [facility] and Miguel A Jerry MD (Resident)    Assessment:    Shira has fortunately been generally stable with regard to her OTC deficiency with good dietary and medication compliance.  She had been living with her mother for the past several months and has had significant improvement in her overall social/mental health environment.  She fortunately has had no complications/concerns related to her OTC deficiency.  She has not been taking her levocarnitine for quite some time, we will check levels today and consider taking off her MAR pending lab results.     Plan:    1. Testing ordered at this visit:   Testing was ordered prior to the visit; see below  AA profile, carnitine levels, CMP, prealbumin; CMP WNL and rest of labs pending  2. Medications: Continue current medications  3. Metabolic dietician follow up with Jenni Joyner RD, LD at this visit to review special dietary concerns. Metabolic diet should be continued as prescribed.   4.   Continue to observe emergency precautions as previously discussed.  Our on-call metabolic service is available 24 hours/day by calling the page  ([phone]) and asking for the Genetics and Metabolism doctor on call.  Provided her with several copies of her emergency care plan to be kept at her new apartment and also provided short literature review to be given to others who are will be living at her  apartment.  5. Return to the Adult Metabolism Clinic in 5 months for follow-up (10/23/2025).       Patient seen and discussed with attending metabolism physician, Dr. Rooney.     Miguel A Jerry MD  Medicine-Pediatrics, PGY2    Agree with above plan.  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  She noted again that since starting Ravicti several years ago, she has had significant improvement in her overall symptomatology.  She has been having some issues with her irritable bowel syndrome and symptoms wax and wane but overall stable.     She does have a history of migraines and does get migraines up to 1-3 times per month.  Sometimes these are attributed to having increased amounts of protein/meat, but since living with her mother, these migraines have improved and her protein levels have been much more consistent.    Interval History:  Shira was last seen in our clinic on 9/28/2023.  Since the last clinic visit Shira was not seen for any urgent clinic visits.  She was not seen in the emergency department related to her OTC. She did have two emergency department visits for back pain and knee pain.  She currently has a written emergency letter for this condition and provided multiple copies of this today.  No hospitalizations occurred.  She had no general anesthesia and no surgical procedures.      Other health services currently received are primary care  Current  research study participation:Longitudinal Study of Urea Cycle Disorders  .           Personal History:  Family History Update:   There was no new family history information elicited at this visit  Family History   Problem Relation Age of Onset    Genetic Disorder Mother         OTC deficiency    Genetic Disorder Brother         OTC deficiency     Lives with her mother currently and has had a much more consistent diet.  She currently is in the process of moving out to an independent apartment that has additional Commnuity support within the building.  Her ex- has since been incarcerated and she has completely cut ties with him.  She notes that she feels much happier and feels in a much better spot from a social/mental health standpoint.  She has been seeing friends much more frequently since the split/divorce.    Current insurance status state/federal program (Medicaid/Medicare).      I have reviewed Shira s past medical history, family history, social history, medications and allergies as documented in the patient's electronic medical record.  There were no additional findings except as noted.     Review of Systems:   Consitutional: no recent illnesses  Eyes: negative - normal vision  Ears/Nose/Throat: negative - normal hearing  Respiratory: negative  Cardiovascular: negative  Gastrointestinal: ongoing loose stools related to IBS-D  Genitourinary: negative  Hematologic/Lymphatic: negative  Allergy/Immunologic: negative - no drug allergies  Musculoskeletal: Working on trying to walk on a regular basis  Endocrine: negative  Integument: negative  Neurologic: Some issues with headaches/migraines, improved  Psychiatric: negative    Medications:  Current Outpatient Medications   Medication Sig Dispense Refill    atenolol (TENORMIN) 50 MG tablet Take 50 mg by mouth daily      calcium-vitamin D (CALCIUM 600 + D) 600-400 MG-UNIT per tablet Take 1 tablet by mouth 2 times daily (with meals). 60 tablet 0    glycerol  "Phenylbutyrate (RAVICTI) 1.1 GM/ML solution Take 3.4 mLs (3.74 g) by mouth 3 times daily. with food 306 mL 11    glycopyrrolate (ROBINUL) 0.4 MG/2ML injection Inject 2 mg into the vein 3 times daily      ibuprofen (ADVIL,MOTRIN) 400 MG tablet Take 400 mg by mouth every 6 hours as needed for moderate pain      l-citrulline POWD Take 1.8 g by mouth 4 times daily 250 g 12    levOCARNitine (CARNITOR) 1 GM/10ML solution Take 5 mLs (500 mg) by mouth 3 times daily 450 mL 11    Multiple Vitamins-Iron (MULTIVITAMIN  /IRON) TABS Take 1 tablet daily.      omeprazole (PRILOSEC) 20 MG capsule Take by mouth daily      ondansetron (ZOFRAN-ODT) 4 MG disintegrating tablet Take by mouth every 12 hours as needed for nausea      Sertraline HCl (ZOLOFT PO) Take 50 mg by mouth daily       Allergies:  Allergies   Allergen Reactions    Aspirin GI Disturbance and Unknown     Rxn unknown by patient    Compazine [Prochlorperazine]     Erythromycin     Msg [Monosodium Glutamate]      Physical Examination:  Blood pressure 129/77, pulse 78, height 1.651 m (5' 5\"), weight 93.4 kg (206 lb), last menstrual period 08/01/2013, SpO2 97%.  Body surface area is 2.07 meters squared.  BMI: Body mass index is 34.28 kg/m .  General: This was a well-developed, well-nourished female who responded appropriately to all requests during the examination.    Head and Neck:  She had hair of normal texture and distribution and her head was proportionate in appearance.The neck was supple without lymphadenopathy.    Eyes:  The pupils were equal, round, and reacted to light.   The conjunctivae were clear.   Ears:  Her ears were normal in architecture and placement.   Nose: The nose was clear.    Mouth and Throat: her dentition was normal.  The throat was without erythema.    Respiratory: The chest was clear to auscultation and had a symmetric appearance.    CV:  On examination of the heart, the rhythm was regular and there was no murmur.    GI: The abdomen was soft and " had normal bowel sounds.  There was no hepatosplenomegaly.    Musculoskeletal: There was a full range of motion on the extremity exam, and normal muscular volume and bulk.   Neurologic: The neurologic exam was normal, with normal cranial nerves, normal sensation, and a normal gait. She had normal tone.   Integument: The skin was normal with no rashes or unusual pigmentation.    Results of laboratory studies collected at this visit and available when this note was completed:   Results for orders placed or performed in visit on 05/22/25   Comprehensive metabolic panel     Status: Abnormal   Result Value Ref Range    Sodium 139 135 - 145 mmol/L    Potassium 3.6 3.4 - 5.3 mmol/L    Carbon Dioxide (CO2) 24 22 - 29 mmol/L    Anion Gap 12 7 - 15 mmol/L    Urea Nitrogen 7.0 6.0 - 20.0 mg/dL    Creatinine 0.65 0.51 - 0.95 mg/dL    GFR Estimate >90 >60 mL/min/1.73m2    Calcium 9.3 8.8 - 10.4 mg/dL    Chloride 103 98 - 107 mmol/L    Glucose 109 (H) 70 - 99 mg/dL    Alkaline Phosphatase 74 40 - 150 U/L    AST 15 0 - 45 U/L    ALT 18 0 - 50 U/L    Protein Total 7.3 6.4 - 8.3 g/dL    Albumin 4.2 3.5 - 5.2 g/dL    Bilirubin Total 0.5 <=1.2 mg/dL   CBC with platelets and differential     Status: None   Result Value Ref Range    WBC Count 6.5 4.0 - 11.0 10e3/uL    RBC Count 5.09 3.80 - 5.20 10e6/uL    Hemoglobin 14.4 11.7 - 15.7 g/dL    Hematocrit 42.8 35.0 - 47.0 %    MCV 84 78 - 100 fL    MCH 28.3 26.5 - 33.0 pg    MCHC 33.6 31.5 - 36.5 g/dL    RDW 12.4 10.0 - 15.0 %    Platelet Count 212 150 - 450 10e3/uL    % Neutrophils 64 %    % Lymphocytes 31 %    % Monocytes 3 %    % Eosinophils 2 %    % Basophils 1 %    % Immature Granulocytes 0 %    NRBCs per 100 WBC 0 <1 /100    Absolute Neutrophils 4.2 1.6 - 8.3 10e3/uL    Absolute Lymphocytes 2.0 0.8 - 5.3 10e3/uL    Absolute Monocytes 0.2 0.0 - 1.3 10e3/uL    Absolute Eosinophils 0.1 0.0 - 0.7 10e3/uL    Absolute Basophils 0.1 0.0 - 0.2 10e3/uL    Absolute Immature Granulocytes 0.0  <=0.4 10e3/uL    Absolute NRBCs 0.0 10e3/uL   CBC with Platelets & Differential     Status: None    Narrative    The following orders were created for panel order CBC with Platelets & Differential.  Procedure                               Abnormality         Status                     ---------                               -----------         ------                     CBC with platelets and ...[[id number]]                      Final result                 Please view results for these tests on the individual orders.     Routed to patient in Comm Mgt  Also to Alex Lewis    40 minutes spent on the date of the encounter doing chart review, history and exam, documentation and further activities per the note. The longitudinal plan of care for the diagnosis(es)/condition(s) as documented were addressed during this visit. Due to the added complexity in care, I will continue to support Shira in the subsequent management and with ongoing continuity of care for her OTC deficiency.    ------       I was present with the trainee who participated in the documentation of this note. I personally saw and evaluated the patient and performed my own history and examination. I discussed the case with the trainee. I have reviewed, verified, and revised the note as necessary and agree with the content and plan as written by the trainee and minimally edited/added to by me.       I completed this note started by the trainee. Exam was done along with the trainee. I was present for the encounter including elicitation of key history. Note above indicates my medical decision making.       I very much appreciate the help of Dr. Jerry in preparation of this documentation and in working with this patient.      Zaheer Rooney, [facility],   Division of Genetics and Metabolism,   Department of Pediatrics  [email or web address]  Text page via [email or web address] Paging/Directory   Securely message with [employer] (more info)

## 2025-05-22 NOTE — NURSING NOTE
"Chief Complaint   Patient presents with    RECHECK     Vital signs:      BP: 129/77 Pulse: 78     SpO2: 97 %     Height: 165.1 cm (5' 5\") Weight: 93.4 kg (206 lb)  Estimated body mass index is 34.28 kg/m  as calculated from the following:    Height as of this encounter: 1.651 m (5' 5\").    Weight as of this encounter: 93.4 kg (206 lb).         "

## 2025-05-22 NOTE — LETTER
2025      RE: Shira William  [address] [id number]     Dear Colleague,    Thank you for the opportunity to participate in the care of your patient, Shira William, at the [facility] at [facility]. Please see a copy of my visit note below.    CLINICAL NUTRITION SERVICES - PEDIATRIC ASSESSMENT NOTE     REASON FOR ASSESSMENT  Shira William is a 44 year old female seen by the dietitian in Adult metabolic clinic for OTC deficiency (urea cycle disorder). Patient is seen in combination with mom today.     RECOMMENDATIONS     Continue protein-restricted diet of 40-45 gm/day.            ANTHROPOMETRICS  Height: 165.1 cm or 65 in   Weight: 94 or 206 lbs  BMI: 35.5  IBW: 55 kg  ABW: 83 kg     Comments:  Weight: decrease 7 lbs over past 6 mo  : 213 lbs  : 215 lbs   : 239 lbs     NUTRITION HISTORY  Shira is on protein-restricted diet of 40-45 gm/day.     Typical oral intakes:  Food lo kcals and 26 gm pro  1050 kcals and 43 gm pro  900 kcals and 25 gm pro  3 day average: 980 kcals/day (10 kcal/kg), 31 gm pro (0.4 g/kg ABW, 0.6 g/kg IBW).     Special considerations:  Nutrition related medical updates:   None  Special diet:   Low protein for UCD  Vitamins/Supplements:   MVI daily  Calcium/Vit D (600/400) BID     Other:  Physical activity:   Patient tries to go on walks a few times a week  Social:   Patient will be moving into an apartment building with many group activities/social opportunities.  Social stressors remain.     GI:  Patient noted to have IBS and frequently has attacks aggravated by certain foods.     NUTRITION RELATED PHYSICAL FINDINGS  None     NUTRITION RELATED LABS  Labs reviewed  -Prealbumin: 21.2, WNl  Plasma Amino Acids  -GLUT: 77, WNL  -RADHA: 13, WNL  -ILE: 8, WNL  -KIERRA: 21, WNL     NUTRITION RELATED MEDICATIONS  Medications reviewed  -Ravicti TID  -Citrulline four times  daily     ESTIMATED NUTRITION NEEDS:  Estimated Energy Needs: Miki Ackerman (1600) x 1.0-1.2 = 17-20 kcal/kg  Estimated Protein Needs: RDA for age = 0.8 g/kg, UCD/age range 0.5-1 g/kg using IBW-ABW  Micronutrient Needs: 1200 mg calcium, 15 mcg Vitamin D, 18 mg iron     NUTRITION DIAGNOSIS  Impaired nutrient utilization related to diagnosis of OTC deficiency as evidenced by risk of hyperammonemia with stress/illness, catabolism, or very high protein intake.      INTERVENTIONS  Nutrition Prescription  Shira to meet 100% estimated needs through protein-restricted diet..    Nutrition Education:   Provided education on ongoing plan of nutritional care.     Continue goal of 40-45 gm pro/day  Encouraged healthy food choices and frequent physical activity as means to help mental health and energy levels.     Implementation:  Implementation: Collaboration with other providers - seen with Adult Metabolic provider/MD.    Goals  BMI: decrease BMI or at minimum maintain/maintenance   Shira will follow a low protein diet  Protein-status labs WNL, plasma amino acids, glutamine/ammonia WNL    FOLLOW UP/MONITORING  Food and Beverage intake  Macronutrient intake  Anthropometric measurements     Spent 15 minutes in consult with Shira William and mother.     Jenni Joyner RD, LD    Please do not hesitate to contact me if you have any questions/concerns.     Sincerely,       Jenni Joyner RD

## 2025-05-25 LAB
ACYLCARNITINE SERPL-SCNC: 12 UMOL/L
CARN ESTERS/C0 SERPL-SRTO: 0.5 {RATIO}
CARNITINE FREE SERPL-SCNC: 25 UMOL/L
CARNITINE SERPL-SCNC: 37 UMOL/L

## 2025-05-28 LAB
(HCYS)2 SERPL-SCNC: 0 UMOL/DL
1ME-HIST SERPL-SCNC: 3 UMOL/DL (ref 0–2)
3ME-HISTIDINE SERPL-SCNC: <1 UMOL/DL (ref 0–1)
AAA SERPL-SCNC: 0 UMOL/DL (ref 0–6)
ALANINE SERPL-SCNC: 0 UMOL/DL
ALANINE SFR SERPL: 34 UMOL/DL (ref 22–62)
AMINO ACID PAT SERPL-IMP: ABNORMAL
ANSERINE SERPL-SCNC: 0 UMOL/DL
ARGININE SERPL-SCNC: 10 UMOL/DL (ref 2–18)
ASPARAGINE SERPL-SCNC: 5 UMOL/DL (ref 1–5)
ASPARTATE SERPL-SCNC: <1 UMOL/DL (ref 0–4)
B-AIB SERPL-SCNC: 0 UMOL/DL
CARNOSINE SERPL-SCNC: 0 UMOL/DL
CITRULLINE SERPL-SCNC: 4.6 UMOL/DL (ref 1.3–6)
CYSTATHIONIN SERPL-SCNC: 0 UMOL/DL
CYSTINE SERPL-SCNC: 8 UMOL/DL (ref 3–15)
GLUTAMATE SERPL-SCNC: 10 UMOL/DL (ref 0–16)
GLUTAMATE SERPL-SCNC: 77 UMOL/DL (ref 41–86)
GLYCINE SERPL-SCNC: 26 UMOL/DL (ref 13–50)
HISTIDINE SERPL-SCNC: 7 UMOL/DL (ref 3–15)
ISOLEUCINE SERPL-SCNC: 8 UMOL/DL (ref 4–11)
LEUCINE SERPL-SCNC: 13 UMOL/DL (ref 8–21)
LYSINE SERPL-SCNC: 17 UMOL/DL (ref 6–26)
METHIONINE SERPL-SCNC: 3 UMOL/DL (ref 1–6)
OH-LYSINE SERPL-SCNC: 0 UMOL/DL
OH-PROLINE SERPL-SCNC: 1 UMOL/DL (ref 0–3)
ORNITHINE SERPL-SCNC: 10 UMOL/DL (ref 2–16)
PHE SERPL-SCNC: 5.2 UMOL/DL (ref 3–10)
PROLINE SERPL-SCNC: 24 UMOL/DL (ref 0–48)
SARCOSINE SERPL-SCNC: 0 UMOL/DL
SERINE SERPL-SCNC: 11 UMOL/DL (ref 4–18)
TAURINE SERPL-SCNC: 7 UMOL/DL (ref 7–32)
THREONINE SERPL-SCNC: 15 UMOL/DL (ref 5–25)
TYROSINE SERPL-SCNC: 5.8 UMOL/DL (ref 4–13)
VALINE SERPL-SCNC: 21 UMOL/DL (ref 8–46)

## 2025-07-02 NOTE — PROGRESS NOTES
CLINICAL NUTRITION SERVICES - PEDIATRIC ASSESSMENT NOTE     REASON FOR ASSESSMENT  Shira William is a 44 year old female seen by the dietitian in Adult metabolic clinic for OTC deficiency (urea cycle disorder). Patient is seen in combination with mom today.     RECOMMENDATIONS     Continue protein-restricted diet of 40-45 gm/day.            ANTHROPOMETRICS  Height: 165.1 cm or 65 in   Weight: 94 or 206 lbs  BMI: 35.5  IBW: 55 kg  ABW: 83 kg     Comments:  Weight: decrease 7 lbs over past 6 mo  : 213 lbs  : 215 lbs   : 239 lbs     NUTRITION HISTORY  Shira is on protein-restricted diet of 40-45 gm/day.     Typical oral intakes:  Food lo kcals and 26 gm pro  1050 kcals and 43 gm pro  900 kcals and 25 gm pro  3 day average: 980 kcals/day (10 kcal/kg), 31 gm pro (0.4 g/kg ABW, 0.6 g/kg IBW).     Special considerations:  Nutrition related medical updates:   None  Special diet:   Low protein for UCD  Vitamins/Supplements:   MVI daily  Calcium/Vit D (600/400) BID     Other:  Physical activity:   Patient tries to go on walks a few times a week  Social:   Patient will be moving into an apartment building with many group activities/social opportunities.  Social stressors remain.     GI:  Patient noted to have IBS and frequently has attacks aggravated by certain foods.     NUTRITION RELATED PHYSICAL FINDINGS  None     NUTRITION RELATED LABS  Labs reviewed  -Prealbumin: 21.2, WNl  Plasma Amino Acids  -GLUT: 77, WNL  -RADHA: 13, WNL  -ILE: 8, WNL  -KIERRA: 21, WNL     NUTRITION RELATED MEDICATIONS  Medications reviewed  -Ravicti TID  -Citrulline four times daily     ESTIMATED NUTRITION NEEDS:  Estimated Energy Needs: [place]-St. Jeor (1600) x 1.0-1.2 = 17-20 kcal/kg  Estimated Protein Needs: RDA for age = 0.8 g/kg, UCD/age range 0.5-1 g/kg using IBW-ABW  Micronutrient Needs: 1200 mg calcium, 15 mcg Vitamin D, 18 mg iron     NUTRITION DIAGNOSIS  Impaired nutrient utilization related to diagnosis of OTC  deficiency as evidenced by risk of hyperammonemia with stress/illness, catabolism, or very high protein intake.      INTERVENTIONS  Nutrition Prescription  Shira to meet 100% estimated needs through protein-restricted diet..    Nutrition Education:   Provided education on ongoing plan of nutritional care.     Continue goal of 40-45 gm pro/day  Encouraged healthy food choices and frequent physical activity as means to help mental health and energy levels.     Implementation:  Implementation: Collaboration with other providers - seen with Adult Metabolic provider/MD.    Goals  BMI: decrease BMI or at minimum maintain/maintenance   Shira will follow a low protein diet  Protein-status labs WNL, plasma amino acids, glutamine/ammonia WNL    FOLLOW UP/MONITORING  Food and Beverage intake  Macronutrient intake  Anthropometric measurements     Spent 15 minutes in consult with Shira William and mother.     Jenni Joyner RD, LD

## 2025-07-16 ENCOUNTER — TELEPHONE (OUTPATIENT)
Dept: ENDOCRINOLOGY | Facility: CLINIC | Age: 45
End: 2025-07-16
Payer: MEDICARE

## 2025-07-16 DIAGNOSIS — E72.4 OTC (ORNITHINE TRANSCARBAMYLASE DEFICIENCY): Primary | ICD-10-CM

## 2025-07-16 NOTE — TELEPHONE ENCOUNTER
7/16/2025-        Orders sent to [facility] for L-Citrulline supplementation (change to pills):     Current Outpatient Medications   Medication Sig Dispense Refill    capsule citrulline (L-CITRULLINE) 600 MG Take 3 capsules (1,800 mg) by mouth 4 times daily. 360 capsule 11         See also letters tab.    AVA Moralez, CNP  Genetics/Metabolism

## 2025-07-16 NOTE — LETTER
[facility]  Attn: Nutrition Assistants  Fax: [phone]     Re: Shira William  : 1980  MRN: [id number]  Address: [address] [place] [address], [address]                  [place] [address]      2025     To Whom It May Concern:      Please accept the following as documentation of the prescribed supplement, Citrulline (capsules), for my patient, Shira William's Ornithine Transcarbamylase (OTC) deficiency. She was previously getting powder, but would like to switch.      Current Outpatient Medications   Medication Sig Dispense Refill    capsule citrulline (L-CITRULLINE) 600 MG Take 3 capsules (1,800 mg) by mouth 4 times daily. 360 capsule 11      Please contact our Urea Cycle Disorder Coordinator, AVA Moralez, CNP at [phone] if you have questions/concerns regarding this prescription.      Sincerely,    CHUN RODRIGUEZ M.D., FAAP, FACMG  Professor   Division of Genetics and Metabolism  Department of Pediatrics  [facility]